# Patient Record
Sex: FEMALE | Race: WHITE | NOT HISPANIC OR LATINO | Employment: OTHER | URBAN - METROPOLITAN AREA
[De-identification: names, ages, dates, MRNs, and addresses within clinical notes are randomized per-mention and may not be internally consistent; named-entity substitution may affect disease eponyms.]

---

## 2023-08-31 ENCOUNTER — HOSPITAL ENCOUNTER (OUTPATIENT)
Dept: DATA CONVERSION | Facility: HOSPITAL | Age: 58
Discharge: HOME | End: 2023-08-31
Payer: MEDICARE

## 2023-08-31 DIAGNOSIS — R60.9 EDEMA, UNSPECIFIED: ICD-10-CM

## 2023-08-31 DIAGNOSIS — R06.02 SHORTNESS OF BREATH: ICD-10-CM

## 2023-09-01 LAB
ANION GAP SERPL CALCULATED.3IONS-SCNC: 20 MMOL/L (ref 0–19)
BUN SERPL-MCNC: 11 MG/DL (ref 8–25)
BUN/CREAT SERPL: 10 RATIO (ref 8–21)
CALCIUM SERPL-MCNC: 8.8 MG/DL (ref 8.5–10.4)
CHLORIDE SERPL-SCNC: 93 MMOL/L (ref 97–107)
CO2 SERPL-SCNC: 21 MMOL/L (ref 24–31)
CREAT SERPL-MCNC: 1.1 MG/DL (ref 0.4–1.6)
GFR SERPL CREATININE-BSD FRML MDRD: 59 ML/MIN/1.73 M2
GLUCOSE SERPL-MCNC: 144 MG/DL (ref 65–99)
NT-PROBNP SERPL-MCNC: 297 PG/ML (ref 0–226)
POTASSIUM SERPL-SCNC: 3.2 MMOL/L (ref 3.4–5.1)
SODIUM SERPL-SCNC: 134 MMOL/L (ref 133–145)

## 2023-09-12 ENCOUNTER — HOSPITAL ENCOUNTER (OUTPATIENT)
Dept: DATA CONVERSION | Facility: HOSPITAL | Age: 58
Discharge: HOME | End: 2023-09-12
Payer: MEDICARE

## 2023-09-12 DIAGNOSIS — E87.6 HYPOKALEMIA: ICD-10-CM

## 2023-09-12 LAB — POTASSIUM SERPL-SCNC: 3.8 MMOL/L (ref 3.4–5.1)

## 2023-09-16 VITALS
BODY MASS INDEX: 44.56 KG/M2 | SYSTOLIC BLOOD PRESSURE: 126 MMHG | OXYGEN SATURATION: 6 % | RESPIRATION RATE: 20 BRPM | HEIGHT: 61 IN | WEIGHT: 236 LBS | DIASTOLIC BLOOD PRESSURE: 70 MMHG | HEART RATE: 104 BPM | TEMPERATURE: 97.6 F

## 2023-10-10 ENCOUNTER — TELEMEDICINE (OUTPATIENT)
Dept: PRIMARY CARE | Facility: CLINIC | Age: 58
End: 2023-10-10
Payer: MEDICARE

## 2023-10-10 DIAGNOSIS — J32.9 SINUSITIS, UNSPECIFIED CHRONICITY, UNSPECIFIED LOCATION: Primary | ICD-10-CM

## 2023-10-10 PROCEDURE — 99213 OFFICE O/P EST LOW 20 MIN: CPT | Performed by: FAMILY MEDICINE

## 2023-10-10 PROCEDURE — 99213 OFFICE O/P EST LOW 20 MIN: CPT | Mod: 95 | Performed by: FAMILY MEDICINE

## 2023-10-10 RX ORDER — AMOXICILLIN AND CLAVULANATE POTASSIUM 875; 125 MG/1; MG/1
875 TABLET, FILM COATED ORAL 2 TIMES DAILY
Qty: 20 TABLET | Refills: 0 | Status: SHIPPED | OUTPATIENT
Start: 2023-10-10 | End: 2023-10-20

## 2023-10-10 RX ORDER — FLUOXETINE 10 MG/1
10 CAPSULE ORAL DAILY
COMMUNITY

## 2023-10-10 RX ORDER — LISINOPRIL 40 MG/1
40 TABLET ORAL DAILY
COMMUNITY
End: 2023-12-18 | Stop reason: SDUPTHER

## 2023-10-10 RX ORDER — ATORVASTATIN CALCIUM 20 MG/1
20 TABLET, FILM COATED ORAL DAILY
COMMUNITY

## 2023-10-10 RX ORDER — ZIPRASIDONE HYDROCHLORIDE 60 MG/1
60 CAPSULE ORAL EVERY 12 HOURS
COMMUNITY
Start: 2022-11-07

## 2023-10-10 RX ORDER — FUROSEMIDE 40 MG/1
80 TABLET ORAL DAILY
COMMUNITY
End: 2023-12-11

## 2023-10-10 RX ORDER — SUMATRIPTAN SUCCINATE 100 MG/1
100 TABLET ORAL ONCE AS NEEDED
COMMUNITY
Start: 2020-10-13

## 2023-10-10 RX ORDER — POTASSIUM CHLORIDE 1500 MG/1
20 TABLET, EXTENDED RELEASE ORAL DAILY
COMMUNITY
Start: 2022-10-15

## 2023-10-10 RX ORDER — ALBUTEROL SULFATE 0.83 MG/ML
2.5 SOLUTION RESPIRATORY (INHALATION) EVERY 6 HOURS PRN
COMMUNITY
Start: 2012-04-26 | End: 2023-11-15 | Stop reason: WASHOUT

## 2023-10-10 RX ORDER — BUSPIRONE HYDROCHLORIDE 30 MG/1
30 TABLET ORAL 2 TIMES DAILY
COMMUNITY

## 2023-10-10 ASSESSMENT — ENCOUNTER SYMPTOMS
COUGH: 1
SINUS PRESSURE: 1
SORE THROAT: 1
SINUS PAIN: 1

## 2023-10-10 ASSESSMENT — PATIENT HEALTH QUESTIONNAIRE - PHQ9
2. FEELING DOWN, DEPRESSED OR HOPELESS: NOT AT ALL
SUM OF ALL RESPONSES TO PHQ9 QUESTIONS 1 AND 2: 0
1. LITTLE INTEREST OR PLEASURE IN DOING THINGS: NOT AT ALL

## 2023-10-10 NOTE — PROGRESS NOTES
Subjective   Patient ID: Celine Jhaveri is a 58 y.o. female who presents for Cough (SHE STATES THIS STARTED 5 DAYS AGO ).    HPI     Review of Systems    Objective   There were no vitals taken for this visit.    Physical Exam    Assessment/Plan   Problem List Items Addressed This Visit             ICD-10-CM    Sinusitis - Primary J32.9    Relevant Medications    amoxicillin-pot clavulanate (Augmentin) 875-125 mg tablet

## 2023-10-10 NOTE — PROGRESS NOTES
Subjective   Patient ID: Celine Jhaveri is a 58 y.o. female who presents for COUGH     Cough  Associated symptoms include a sore throat.    PT C/O COUGH, SINUS ISSUES, AND SORE THROAT SINCE FRIDAY     Review of Systems   HENT:  Positive for sinus pressure, sinus pain and sore throat.    Respiratory:  Positive for cough.        Objective   There were no vitals taken for this visit.    Physical Exam    Assessment/Plan

## 2023-11-10 ENCOUNTER — HOSPITAL ENCOUNTER (EMERGENCY)
Facility: HOSPITAL | Age: 58
Discharge: HOME | End: 2023-11-10
Attending: STUDENT IN AN ORGANIZED HEALTH CARE EDUCATION/TRAINING PROGRAM
Payer: COMMERCIAL

## 2023-11-10 ENCOUNTER — HOSPITAL ENCOUNTER (OUTPATIENT)
Dept: CARDIOLOGY | Facility: HOSPITAL | Age: 58
Discharge: HOME | End: 2023-11-10
Payer: COMMERCIAL

## 2023-11-10 ENCOUNTER — APPOINTMENT (OUTPATIENT)
Dept: RADIOLOGY | Facility: HOSPITAL | Age: 58
End: 2023-11-10
Payer: COMMERCIAL

## 2023-11-10 VITALS
WEIGHT: 227 LBS | OXYGEN SATURATION: 95 % | RESPIRATION RATE: 18 BRPM | BODY MASS INDEX: 41.77 KG/M2 | HEIGHT: 62 IN | SYSTOLIC BLOOD PRESSURE: 109 MMHG | HEART RATE: 94 BPM | DIASTOLIC BLOOD PRESSURE: 67 MMHG | TEMPERATURE: 97.9 F

## 2023-11-10 DIAGNOSIS — J06.9 UPPER RESPIRATORY TRACT INFECTION, UNSPECIFIED TYPE: ICD-10-CM

## 2023-11-10 DIAGNOSIS — J44.1 COPD EXACERBATION (MULTI): Primary | ICD-10-CM

## 2023-11-10 LAB
ALBUMIN SERPL-MCNC: 3.8 G/DL (ref 3.5–5)
ALP BLD-CCNC: 197 U/L (ref 35–125)
ALT SERPL-CCNC: 19 U/L (ref 5–40)
ANION GAP SERPL CALC-SCNC: 9 MMOL/L
AST SERPL-CCNC: 19 U/L (ref 5–40)
ATRIAL RATE: 95 BPM
BASOPHILS # BLD AUTO: 0.07 X10*3/UL (ref 0–0.1)
BASOPHILS NFR BLD AUTO: 0.9 %
BILIRUB SERPL-MCNC: 0.9 MG/DL (ref 0.1–1.2)
BUN SERPL-MCNC: 14 MG/DL (ref 8–25)
CALCIUM SERPL-MCNC: 9 MG/DL (ref 8.5–10.4)
CHLORIDE SERPL-SCNC: 100 MMOL/L (ref 97–107)
CO2 SERPL-SCNC: 28 MMOL/L (ref 24–31)
CREAT SERPL-MCNC: 1.3 MG/DL (ref 0.4–1.6)
EOSINOPHIL # BLD AUTO: 0.21 X10*3/UL (ref 0–0.7)
EOSINOPHIL NFR BLD AUTO: 2.7 %
ERYTHROCYTE [DISTWIDTH] IN BLOOD BY AUTOMATED COUNT: 13.2 % (ref 11.5–14.5)
FLUAV RNA RESP QL NAA+PROBE: NOT DETECTED
FLUBV RNA RESP QL NAA+PROBE: NOT DETECTED
GFR SERPL CREATININE-BSD FRML MDRD: 48 ML/MIN/1.73M*2
GLUCOSE SERPL-MCNC: 104 MG/DL (ref 65–99)
HCT VFR BLD AUTO: 42.7 % (ref 36–46)
HGB BLD-MCNC: 15 G/DL (ref 12–16)
IMM GRANULOCYTES # BLD AUTO: 0.02 X10*3/UL (ref 0–0.7)
IMM GRANULOCYTES NFR BLD AUTO: 0.3 % (ref 0–0.9)
LYMPHOCYTES # BLD AUTO: 1.61 X10*3/UL (ref 1.2–4.8)
LYMPHOCYTES NFR BLD AUTO: 21 %
MCH RBC QN AUTO: 33 PG (ref 26–34)
MCHC RBC AUTO-ENTMCNC: 35.1 G/DL (ref 32–36)
MCV RBC AUTO: 94 FL (ref 80–100)
MONOCYTES # BLD AUTO: 0.66 X10*3/UL (ref 0.1–1)
MONOCYTES NFR BLD AUTO: 8.6 %
NEUTROPHILS # BLD AUTO: 5.1 X10*3/UL (ref 1.2–7.7)
NEUTROPHILS NFR BLD AUTO: 66.5 %
NRBC BLD-RTO: 0 /100 WBCS (ref 0–0)
NT-PROBNP SERPL-MCNC: 67 PG/ML (ref 0–226)
P AXIS: 65 DEGREES
P OFFSET: 192 MS
P ONSET: 133 MS
PLATELET # BLD AUTO: 143 X10*3/UL (ref 150–450)
POTASSIUM SERPL-SCNC: 3.2 MMOL/L (ref 3.4–5.1)
PR INTERVAL: 174 MS
PROT SERPL-MCNC: 7.3 G/DL (ref 5.9–7.9)
Q ONSET: 220 MS
QRS COUNT: 16 BEATS
QRS DURATION: 80 MS
QT INTERVAL: 382 MS
QTC CALCULATION(BAZETT): 480 MS
QTC FREDERICIA: 445 MS
R AXIS: 9 DEGREES
RBC # BLD AUTO: 4.54 X10*6/UL (ref 4–5.2)
SARS-COV-2 RNA RESP QL NAA+PROBE: NOT DETECTED
SODIUM SERPL-SCNC: 137 MMOL/L (ref 133–145)
T AXIS: 60 DEGREES
T OFFSET: 411 MS
TROPONIN T SERPL-MCNC: 8 NG/L
VENTRICULAR RATE: 95 BPM
WBC # BLD AUTO: 7.7 X10*3/UL (ref 4.4–11.3)

## 2023-11-10 PROCEDURE — 83880 ASSAY OF NATRIURETIC PEPTIDE: CPT | Performed by: STUDENT IN AN ORGANIZED HEALTH CARE EDUCATION/TRAINING PROGRAM

## 2023-11-10 PROCEDURE — 85025 COMPLETE CBC W/AUTO DIFF WBC: CPT | Performed by: STUDENT IN AN ORGANIZED HEALTH CARE EDUCATION/TRAINING PROGRAM

## 2023-11-10 PROCEDURE — 2500000002 HC RX 250 W HCPCS SELF ADMINISTERED DRUGS (ALT 637 FOR MEDICARE OP, ALT 636 FOR OP/ED): Performed by: STUDENT IN AN ORGANIZED HEALTH CARE EDUCATION/TRAINING PROGRAM

## 2023-11-10 PROCEDURE — 99284 EMERGENCY DEPT VISIT MOD MDM: CPT | Mod: 25

## 2023-11-10 PROCEDURE — 84484 ASSAY OF TROPONIN QUANT: CPT | Performed by: STUDENT IN AN ORGANIZED HEALTH CARE EDUCATION/TRAINING PROGRAM

## 2023-11-10 PROCEDURE — 80053 COMPREHEN METABOLIC PANEL: CPT | Performed by: STUDENT IN AN ORGANIZED HEALTH CARE EDUCATION/TRAINING PROGRAM

## 2023-11-10 PROCEDURE — 2500000004 HC RX 250 GENERAL PHARMACY W/ HCPCS (ALT 636 FOR OP/ED): Performed by: STUDENT IN AN ORGANIZED HEALTH CARE EDUCATION/TRAINING PROGRAM

## 2023-11-10 PROCEDURE — 71045 X-RAY EXAM CHEST 1 VIEW: CPT | Mod: FY

## 2023-11-10 PROCEDURE — 94640 AIRWAY INHALATION TREATMENT: CPT

## 2023-11-10 PROCEDURE — 93005 ELECTROCARDIOGRAM TRACING: CPT

## 2023-11-10 PROCEDURE — 36415 COLL VENOUS BLD VENIPUNCTURE: CPT | Performed by: STUDENT IN AN ORGANIZED HEALTH CARE EDUCATION/TRAINING PROGRAM

## 2023-11-10 PROCEDURE — 96374 THER/PROPH/DIAG INJ IV PUSH: CPT

## 2023-11-10 PROCEDURE — 87636 SARSCOV2 & INF A&B AMP PRB: CPT | Performed by: STUDENT IN AN ORGANIZED HEALTH CARE EDUCATION/TRAINING PROGRAM

## 2023-11-10 PROCEDURE — 99285 EMERGENCY DEPT VISIT HI MDM: CPT | Mod: 25 | Performed by: STUDENT IN AN ORGANIZED HEALTH CARE EDUCATION/TRAINING PROGRAM

## 2023-11-10 RX ORDER — PREDNISONE 20 MG/1
40 TABLET ORAL DAILY
Qty: 10 TABLET | Refills: 0 | Status: SHIPPED | OUTPATIENT
Start: 2023-11-10 | End: 2023-11-15 | Stop reason: WASHOUT

## 2023-11-10 RX ORDER — IPRATROPIUM BROMIDE AND ALBUTEROL SULFATE 2.5; .5 MG/3ML; MG/3ML
3 SOLUTION RESPIRATORY (INHALATION) ONCE
Status: COMPLETED | OUTPATIENT
Start: 2023-11-10 | End: 2023-11-10

## 2023-11-10 RX ORDER — TORSEMIDE 100 MG/1
100 TABLET ORAL DAILY
COMMUNITY
End: 2024-03-05

## 2023-11-10 RX ORDER — POTASSIUM CHLORIDE 20 MEQ/1
20 TABLET, EXTENDED RELEASE ORAL ONCE
Status: COMPLETED | OUTPATIENT
Start: 2023-11-10 | End: 2023-11-10

## 2023-11-10 RX ADMIN — IPRATROPIUM BROMIDE AND ALBUTEROL SULFATE 3 ML: 2.5; .5 SOLUTION RESPIRATORY (INHALATION) at 09:24

## 2023-11-10 RX ADMIN — METHYLPREDNISOLONE SODIUM SUCCINATE 125 MG: 125 INJECTION, POWDER, FOR SOLUTION INTRAMUSCULAR; INTRAVENOUS at 07:50

## 2023-11-10 RX ADMIN — IPRATROPIUM BROMIDE AND ALBUTEROL SULFATE 3 ML: 2.5; .5 SOLUTION RESPIRATORY (INHALATION) at 07:50

## 2023-11-10 RX ADMIN — POTASSIUM CHLORIDE 20 MEQ: 1500 TABLET, EXTENDED RELEASE ORAL at 08:25

## 2023-11-10 ASSESSMENT — COLUMBIA-SUICIDE SEVERITY RATING SCALE - C-SSRS
2. HAVE YOU ACTUALLY HAD ANY THOUGHTS OF KILLING YOURSELF?: NO
1. IN THE PAST MONTH, HAVE YOU WISHED YOU WERE DEAD OR WISHED YOU COULD GO TO SLEEP AND NOT WAKE UP?: NO
6. HAVE YOU EVER DONE ANYTHING, STARTED TO DO ANYTHING, OR PREPARED TO DO ANYTHING TO END YOUR LIFE?: NO

## 2023-11-10 ASSESSMENT — PAIN SCALES - GENERAL
PAINLEVEL_OUTOF10: 0 - NO PAIN
PAINLEVEL_OUTOF10: 0 - NO PAIN
PAINLEVEL_OUTOF10: 5 - MODERATE PAIN

## 2023-11-10 ASSESSMENT — PAIN DESCRIPTION - PAIN TYPE: TYPE: OTHER (COMMENT)

## 2023-11-10 ASSESSMENT — PAIN DESCRIPTION - PROGRESSION: CLINICAL_PROGRESSION: NOT CHANGED

## 2023-11-10 ASSESSMENT — PAIN - FUNCTIONAL ASSESSMENT
PAIN_FUNCTIONAL_ASSESSMENT: 0-10
PAIN_FUNCTIONAL_ASSESSMENT: 0-10

## 2023-11-10 ASSESSMENT — PAIN DESCRIPTION - LOCATION: LOCATION: HEAD

## 2023-11-10 NOTE — ED PROVIDER NOTES
"HPI   Chief Complaint   Patient presents with    Shortness of Breath     Sob x 1 days, 1ppd smoker, woke from sleep at 0400 with sob       Patient is a 58-year-old female who presents the emergency department for evaluation of shortness of breath.  Patient states that she has a history of COPD however is not on chronic oxygen.  She started having shortness of breath beginning yesterday along with a productive cough.  She denies any hemoptysis.  She has been using her home albuterol with no improvement of symptoms.  She states is progressively been getting worse.  She does admit to some chest tightness, generalized fatigue.  She denies fever, chills, chest pain, abdominal pain, nausea or vomiting.  EMS noted that patient did have normal oxygen saturation prior to arrival however no medication given prior to arrival.      History provided by:  Patient                      No data recorded                Patient History   Past Medical History:   Diagnosis Date    Anxiety and depression     Bipolar 1 disorder (CMS/[facility])     COPD (chronic obstructive pulmonary disease) (CMS/[facility])     HTN (hypertension)     LINDA (obstructive sleep apnea)      Past Surgical History:   Procedure Laterality Date     SECTION, CLASSIC      FOOT Right     GALLBLADDER SURGERY      HYSTERECTOMY      KNEE Right      No family history on file.  Social History     Tobacco Use    Smoking status: Every Day     Packs/day: 1.00     Years: 46.00     Additional pack years: 0.00     Total pack years: 46.00     Types: Cigarettes    Smokeless tobacco: Never   Vaping Use    Vaping Use: Never used   Substance Use Topics    Alcohol use: Not Currently     Comment: \"recovering addict\"    Drug use: Defer       Physical Exam   ED Triage Vitals [11/10/23 0736]   Temp Heart Rate Resp BP   36.6 °C (97.9 °F) 96 18 (!) 118/92      SpO2 Temp Source Heart Rate Source Patient Position   97 % Tympanic Monitor Sitting      BP Location FiO2 (%)     Left arm --   "     Physical Exam  Vitals and nursing note reviewed.   Constitutional:       General: She is not in acute distress.     Appearance: She is well-developed. She is obese. She is not ill-appearing.   HENT:      Head: Normocephalic and atraumatic.      Mouth/Throat:      Mouth: Mucous membranes are moist.   Eyes:      Extraocular Movements: Extraocular movements intact.      Pupils: Pupils are equal, round, and reactive to light.   Cardiovascular:      Rate and Rhythm: Normal rate and regular rhythm.   Pulmonary:      Effort: Pulmonary effort is normal. No tachypnea, bradypnea, accessory muscle usage or respiratory distress.      Breath sounds: No stridor. Wheezing (Mild bilateral expiratory wheeze) present. No decreased breath sounds, rhonchi or rales.   Musculoskeletal:      Right lower leg: No edema.      Left lower leg: No edema.   Skin:     General: Skin is warm and dry.   Neurological:      General: No focal deficit present.      Mental Status: She is alert.       Recent Results (from the past 24 hour(s))   CBC and Auto Differential    Collection Time: 11/10/23  7:41 AM   Result Value Ref Range    WBC 7.7 4.4 - 11.3 x10*3/uL    nRBC 0.0 0.0 - 0.0 /100 WBCs    RBC 4.54 4.00 - 5.20 x10*6/uL    Hemoglobin 15.0 12.0 - 16.0 g/dL    Hematocrit 42.7 36.0 - 46.0 %    MCV 94 80 - 100 fL    MCH 33.0 26.0 - 34.0 pg    MCHC 35.1 32.0 - 36.0 g/dL    RDW 13.2 11.5 - 14.5 %    Platelets 143 (L) 150 - 450 x10*3/uL    Neutrophils % 66.5 40.0 - 80.0 %    Immature Granulocytes %, Automated 0.3 0.0 - 0.9 %    Lymphocytes % 21.0 13.0 - 44.0 %    Monocytes % 8.6 2.0 - 10.0 %    Eosinophils % 2.7 0.0 - 6.0 %    Basophils % 0.9 0.0 - 2.0 %    Neutrophils Absolute 5.10 1.20 - 7.70 x10*3/uL    Immature Granulocytes Absolute, Automated 0.02 0.00 - 0.70 x10*3/uL    Lymphocytes Absolute 1.61 1.20 - 4.80 x10*3/uL    Monocytes Absolute 0.66 0.10 - 1.00 x10*3/uL    Eosinophils Absolute 0.21 0.00 - 0.70 x10*3/uL    Basophils Absolute 0.07 0.00 -  0.10 x10*3/uL   Comprehensive Metabolic Panel    Collection Time: 11/10/23  7:41 AM   Result Value Ref Range    Glucose 104 (H) 65 - 99 mg/dL    Sodium 137 133 - 145 mmol/L    Potassium 3.2 (L) 3.4 - 5.1 mmol/L    Chloride 100 97 - 107 mmol/L    Bicarbonate 28 24 - 31 mmol/L    Urea Nitrogen 14 8 - 25 mg/dL    Creatinine 1.30 0.40 - 1.60 mg/dL    eGFR 48 (L) >60 mL/min/1.73m*2    Calcium 9.0 8.5 - 10.4 mg/dL    Albumin 3.8 3.5 - 5.0 g/dL    Alkaline Phosphatase 197 (H) 35 - 125 U/L    Total Protein 7.3 5.9 - 7.9 g/dL    AST 19 5 - 40 U/L    Bilirubin, Total 0.9 0.1 - 1.2 mg/dL    ALT 19 5 - 40 U/L    Anion Gap 9 <=19 mmol/L   NT Pro-BNP    Collection Time: 11/10/23  7:41 AM   Result Value Ref Range    PROBNP 67 0 - 226 pg/mL   Serial Troponin, Initial ([facility])    Collection Time: 11/10/23  7:41 AM   Result Value Ref Range    Troponin T, High Sensitivity 8 <=15 ng/L   Sars-CoV-2 PCR, Symptomatic    Collection Time: 11/10/23  7:49 AM   Result Value Ref Range    Coronavirus 2019, PCR Not Detected Not Detected   Influenza A, and B PCR    Collection Time: 11/10/23  7:49 AM   Result Value Ref Range    Flu A Result Not Detected Not Detected    Flu B Result Not Detected Not Detected       ED Course & MDM   ED Course as of 11/10/23 0911   Fri Nov 10, 2023   0744 EKG Time: 0 741  EKG Interpretation time: 0 741  EKG Interpretation: Normal sinus rhythm, normal axis, QTc 480, no evidence of STEMI    EKG was interpreted by myself independently [JL]      ED Course User Index  [JL] Thompson Armas DO         Diagnoses as of 11/10/23 0911   COPD exacerbation (CMS/HCC)   Upper respiratory tract infection, unspecified type       Medical Decision Making  Patient is a 58-year-old female that presents emergency room for evaluation of shortness of breath.  Patient uncomfortable appearing but in no obvious distress.  Vital signs are stable on arrival.  She is awake, alert and oriented.  She was on 3 L nasal cannula on arrival however  reportedly was not hypoxic prior to arrival.  Patient will be taken off of oxygen at this time to determine whether she requires any supplemental oxygen.  Lab work ordered including CBC, CMP, troponin, BNP along with a COVID-19 and influenza test.  Chest x-ray ordered along with EKG.  EKG shows normal sinus rhythm with nonspecific changes but no evidence of STEMI.  Patient will be given DuoNeb breathing treatment, IV Solu-Medrol given her history of COPD as well as bilateral expiratory wheezing.  Blood work unremarkable including negative troponin of 8, normal BNP of 67, normal white count with no left shift.  Patient chest x-ray was clear showing no obvious evidence of pneumonia, pneumothorax, widened mediastinum.  She did have improvement with DuoNeb breathing treatment, IV Solu-Medrol.  Patient still has very mild wheezing present and will be given a second DuoNeb breathing treatment however history exam most consistent with a COPD exacerbation with a viral upper respiratory tract infection.  I did discuss these results with the patient as well as plan for discharge with prednisone.  Her oxygen saturation is 97% on room air at this time.  Patient to follow-up with primary care physician as an outpatient within the next week for reevaluation.        Procedure  Procedures     Thompson Armas, DO  11/10/23 0912

## 2023-11-10 NOTE — DISCHARGE INSTRUCTIONS
It is important take prednisone daily as prescribed.  You should continue to use your albuterol nebulizer at home every 4-6 hours for the next several days and then as needed for shortness of breath after that.  If symptoms significantly worsen or change he can return at any time for further evaluation and treatment.  Follow-up with your primary care physician within 1 week for reevaluation.

## 2023-11-15 ENCOUNTER — OFFICE VISIT (OUTPATIENT)
Dept: PRIMARY CARE | Facility: CLINIC | Age: 58
End: 2023-11-15
Payer: COMMERCIAL

## 2023-11-15 VITALS
HEART RATE: 107 BPM | DIASTOLIC BLOOD PRESSURE: 76 MMHG | BODY MASS INDEX: 40.6 KG/M2 | WEIGHT: 222 LBS | TEMPERATURE: 96.9 F | SYSTOLIC BLOOD PRESSURE: 112 MMHG | OXYGEN SATURATION: 97 % | RESPIRATION RATE: 23 BRPM

## 2023-11-15 DIAGNOSIS — E87.6 HYPOKALEMIA: ICD-10-CM

## 2023-11-15 DIAGNOSIS — J44.1 ACUTE EXACERBATION OF CHRONIC OBSTRUCTIVE PULMONARY DISEASE (MULTI): Primary | ICD-10-CM

## 2023-11-15 DIAGNOSIS — F17.200 TOBACCO USE DISORDER: ICD-10-CM

## 2023-11-15 PROBLEM — J32.0 CHRONIC MAXILLARY SINUSITIS: Status: ACTIVE | Noted: 2023-11-15

## 2023-11-15 PROBLEM — E78.5 HYPERLIPIDEMIA: Status: ACTIVE | Noted: 2023-11-15

## 2023-11-15 PROBLEM — R39.11 URINARY HESITANCY: Status: ACTIVE | Noted: 2023-11-15

## 2023-11-15 PROBLEM — J34.89 NASAL SEPTAL PERFORATION: Status: ACTIVE | Noted: 2023-11-15

## 2023-11-15 PROBLEM — E66.01 MORBID OBESITY (MULTI): Status: ACTIVE | Noted: 2023-11-15

## 2023-11-15 PROBLEM — F31.9 BIPOLAR 1 DISORDER (MULTI): Chronic | Status: ACTIVE | Noted: 2021-05-25

## 2023-11-15 PROBLEM — F10.11 ALCOHOL USE DISORDER, MILD, IN SUSTAINED REMISSION: Status: ACTIVE | Noted: 2021-05-25

## 2023-11-15 PROBLEM — K80.20 CALCULUS OF GALLBLADDER: Status: ACTIVE | Noted: 2023-11-15

## 2023-11-15 PROBLEM — K42.9 UMBILICAL HERNIA: Status: ACTIVE | Noted: 2023-11-15

## 2023-11-15 PROBLEM — G56.00 CARPAL TUNNEL SYNDROME: Status: ACTIVE | Noted: 2023-11-15

## 2023-11-15 PROBLEM — R13.10 DYSPHAGIA: Status: ACTIVE | Noted: 2023-11-15

## 2023-11-15 PROBLEM — G47.9 SLEEP DISTURBANCE: Status: ACTIVE | Noted: 2023-11-15

## 2023-11-15 PROBLEM — N81.10 FEMALE CYSTOCELE: Status: ACTIVE | Noted: 2023-11-15

## 2023-11-15 PROBLEM — G25.81 RESTLESS LEGS: Status: ACTIVE | Noted: 2023-11-15

## 2023-11-15 PROBLEM — N19 RENAL FAILURE: Status: ACTIVE | Noted: 2023-11-15

## 2023-11-15 PROBLEM — J45.909 ASTHMA (HHS-HCC): Status: ACTIVE | Noted: 2023-11-15

## 2023-11-15 PROBLEM — G47.9 SLEEP DISORDER, UNSPECIFIED: Status: ACTIVE | Noted: 2023-11-15

## 2023-11-15 PROBLEM — J44.9 CHRONIC OBSTRUCTIVE PULMONARY DISEASE (MULTI): Status: ACTIVE | Noted: 2023-11-15

## 2023-11-15 PROBLEM — F11.11: Chronic | Status: ACTIVE | Noted: 2021-05-25

## 2023-11-15 PROBLEM — M19.90 OSTEOARTHRITIS: Status: ACTIVE | Noted: 2023-11-15

## 2023-11-15 PROBLEM — I87.2 PERIPHERAL VENOUS INSUFFICIENCY: Status: ACTIVE | Noted: 2023-11-15

## 2023-11-15 PROBLEM — F41.9 ANXIETY DISORDER, UNSPECIFIED: Status: ACTIVE | Noted: 2021-05-25

## 2023-11-15 PROBLEM — M79.7 FIBROMYALGIA: Status: ACTIVE | Noted: 2023-11-15

## 2023-11-15 PROBLEM — R00.0 TACHYCARDIA: Status: ACTIVE | Noted: 2023-11-15

## 2023-11-15 PROBLEM — I50.9 CONGESTIVE HEART FAILURE (MULTI): Status: ACTIVE | Noted: 2023-11-15

## 2023-11-15 PROBLEM — G43.909 MIGRAINE: Status: ACTIVE | Noted: 2023-11-15

## 2023-11-15 PROBLEM — E55.9 VITAMIN D DEFICIENCY: Status: ACTIVE | Noted: 2023-11-15

## 2023-11-15 PROBLEM — F32.A DEPRESSIVE DISORDER: Status: ACTIVE | Noted: 2023-11-15

## 2023-11-15 LAB
ALBUMIN SERPL-MCNC: 4 G/DL (ref 3.5–5)
ALP BLD-CCNC: 182 U/L (ref 35–125)
ALT SERPL-CCNC: 24 U/L (ref 5–40)
ANION GAP SERPL CALC-SCNC: 16 MMOL/L
AST SERPL-CCNC: 18 U/L (ref 5–40)
BILIRUB SERPL-MCNC: 0.5 MG/DL (ref 0.1–1.2)
BUN SERPL-MCNC: 34 MG/DL (ref 8–25)
CALCIUM SERPL-MCNC: 9.8 MG/DL (ref 8.5–10.4)
CHLORIDE SERPL-SCNC: 97 MMOL/L (ref 97–107)
CO2 SERPL-SCNC: 27 MMOL/L (ref 24–31)
CREAT SERPL-MCNC: 1.8 MG/DL (ref 0.4–1.6)
GFR SERPL CREATININE-BSD FRML MDRD: 33 ML/MIN/1.73M*2
GLUCOSE SERPL-MCNC: 142 MG/DL (ref 65–99)
POTASSIUM SERPL-SCNC: 3.6 MMOL/L (ref 3.4–5.1)
PROT SERPL-MCNC: 7.5 G/DL (ref 5.9–7.9)
SODIUM SERPL-SCNC: 140 MMOL/L (ref 133–145)

## 2023-11-15 PROCEDURE — 36415 COLL VENOUS BLD VENIPUNCTURE: CPT | Performed by: REGISTERED NURSE

## 2023-11-15 PROCEDURE — 80053 COMPREHEN METABOLIC PANEL: CPT | Performed by: REGISTERED NURSE

## 2023-11-15 RX ORDER — IPRATROPIUM BROMIDE AND ALBUTEROL SULFATE 2.5; .5 MG/3ML; MG/3ML
3 SOLUTION RESPIRATORY (INHALATION)
Qty: 360 ML | Refills: 2 | Status: SHIPPED | OUTPATIENT
Start: 2023-11-15 | End: 2024-05-29

## 2023-11-15 RX ORDER — LORAZEPAM 0.5 MG/1
0.5 TABLET ORAL EVERY 6 HOURS PRN
COMMUNITY
Start: 2014-06-26 | End: 2024-05-29 | Stop reason: WASHOUT

## 2023-11-15 RX ORDER — ESCITALOPRAM OXALATE 20 MG/1
20 TABLET ORAL DAILY
COMMUNITY
Start: 2014-06-26 | End: 2024-05-29 | Stop reason: WASHOUT

## 2023-11-15 RX ORDER — LANOLIN ALCOHOL/MO/W.PET/CERES
400 CREAM (GRAM) TOPICAL DAILY
COMMUNITY
Start: 2014-06-26 | End: 2024-05-29 | Stop reason: WASHOUT

## 2023-11-15 RX ORDER — TRAZODONE HYDROCHLORIDE 50 MG/1
50 TABLET ORAL NIGHTLY
COMMUNITY
Start: 2014-06-26 | End: 2024-05-29 | Stop reason: WASHOUT

## 2023-11-15 RX ORDER — MONTELUKAST SODIUM 10 MG/1
10 TABLET ORAL NIGHTLY
Qty: 30 TABLET | Refills: 5 | Status: SHIPPED | OUTPATIENT
Start: 2023-11-15 | End: 2024-04-25

## 2023-11-15 RX ORDER — METOPROLOL SUCCINATE 50 MG/1
50 TABLET, EXTENDED RELEASE ORAL DAILY
COMMUNITY
Start: 2014-06-26 | End: 2024-05-29 | Stop reason: WASHOUT

## 2023-11-15 RX ORDER — ASPIRIN 81 MG/1
81 TABLET ORAL ONCE
COMMUNITY
Start: 2014-06-26 | End: 2023-11-15 | Stop reason: WASHOUT

## 2023-11-15 RX ORDER — ALBUTEROL SULFATE 90 UG/1
2 AEROSOL, METERED RESPIRATORY (INHALATION) EVERY 6 HOURS PRN
COMMUNITY
Start: 2023-10-31

## 2023-11-15 RX ORDER — BUDESONIDE AND FORMOTEROL FUMARATE DIHYDRATE 160; 4.5 UG/1; UG/1
2 AEROSOL RESPIRATORY (INHALATION)
COMMUNITY
Start: 2014-06-26

## 2023-11-15 ASSESSMENT — ENCOUNTER SYMPTOMS
DEPRESSION: 0
OCCASIONAL FEELINGS OF UNSTEADINESS: 0
LOSS OF SENSATION IN FEET: 0

## 2023-11-15 ASSESSMENT — COLUMBIA-SUICIDE SEVERITY RATING SCALE - C-SSRS
6. HAVE YOU EVER DONE ANYTHING, STARTED TO DO ANYTHING, OR PREPARED TO DO ANYTHING TO END YOUR LIFE?: NO
2. HAVE YOU ACTUALLY HAD ANY THOUGHTS OF KILLING YOURSELF?: NO
1. IN THE PAST MONTH, HAVE YOU WISHED YOU WERE DEAD OR WISHED YOU COULD GO TO SLEEP AND NOT WAKE UP?: NO

## 2023-11-15 ASSESSMENT — PAIN SCALES - GENERAL: PAINLEVEL: 0-NO PAIN

## 2023-11-15 ASSESSMENT — PATIENT HEALTH QUESTIONNAIRE - PHQ9
SUM OF ALL RESPONSES TO PHQ9 QUESTIONS 1 AND 2: 0
2. FEELING DOWN, DEPRESSED OR HOPELESS: NOT AT ALL
1. LITTLE INTEREST OR PLEASURE IN DOING THINGS: NOT AT ALL

## 2023-11-15 NOTE — PROGRESS NOTES
Subjective   Patient ID: Celine Jhavrei is a 58 y.o. female who presents for COPD ([facility] ed copd, low patassium, viral infection  refill nebulizer solution).    Follow up after ER for COPD         Review of Systems    Objective   /76   Pulse 107   Temp 36.1 °C (96.9 °F)   Resp 23   Wt 101 kg (222 lb)   SpO2 97%   BMI 40.60 kg/m²     Physical Exam  Vitals reviewed.   Constitutional:       Appearance: Normal appearance.   Cardiovascular:      Rate and Rhythm: Normal rate and regular rhythm.      Pulses: Normal pulses.      Heart sounds: Normal heart sounds.   Pulmonary:      Effort: Pulmonary effort is normal.      Breath sounds: Normal breath sounds.   Neurological:      Mental Status: She is alert.         Assessment/Plan   Problem List Items Addressed This Visit             ICD-10-CM    Acute exacerbation of chronic obstructive pulmonary disease (CMS/HCC) J44.1    Tobacco use disorder - Primary F17.200

## 2023-11-20 ENCOUNTER — TELEPHONE (OUTPATIENT)
Dept: PRIMARY CARE | Facility: CLINIC | Age: 58
End: 2023-11-20
Payer: COMMERCIAL

## 2023-11-20 DIAGNOSIS — N19 RENAL FAILURE, UNSPECIFIED CHRONICITY: Primary | ICD-10-CM

## 2023-12-09 DIAGNOSIS — R60.0 LEG EDEMA: Primary | ICD-10-CM

## 2023-12-11 ENCOUNTER — OFFICE VISIT (OUTPATIENT)
Dept: PRIMARY CARE | Facility: CLINIC | Age: 58
End: 2023-12-11
Payer: COMMERCIAL

## 2023-12-11 VITALS
HEIGHT: 62 IN | DIASTOLIC BLOOD PRESSURE: 60 MMHG | WEIGHT: 218.4 LBS | RESPIRATION RATE: 20 BRPM | OXYGEN SATURATION: 97 % | TEMPERATURE: 96.8 F | HEART RATE: 117 BPM | BODY MASS INDEX: 40.19 KG/M2 | SYSTOLIC BLOOD PRESSURE: 122 MMHG

## 2023-12-11 DIAGNOSIS — B37.9 CANDIDIASIS: Primary | ICD-10-CM

## 2023-12-11 PROCEDURE — 99213 OFFICE O/P EST LOW 20 MIN: CPT | Performed by: FAMILY MEDICINE

## 2023-12-11 PROCEDURE — 4004F PT TOBACCO SCREEN RCVD TLK: CPT | Performed by: FAMILY MEDICINE

## 2023-12-11 PROCEDURE — 3074F SYST BP LT 130 MM HG: CPT | Performed by: FAMILY MEDICINE

## 2023-12-11 PROCEDURE — 3078F DIAST BP <80 MM HG: CPT | Performed by: FAMILY MEDICINE

## 2023-12-11 RX ORDER — OMEPRAZOLE 40 MG/1
40 CAPSULE, DELAYED RELEASE ORAL
COMMUNITY
Start: 2023-11-28 | End: 2024-05-29 | Stop reason: WASHOUT

## 2023-12-11 RX ORDER — CLOTRIMAZOLE AND BETAMETHASONE DIPROPIONATE 10; .64 MG/G; MG/G
1 CREAM TOPICAL 2 TIMES DAILY
Qty: 6 G | Refills: 0 | Status: SHIPPED | OUTPATIENT
Start: 2023-12-11 | End: 2024-01-10

## 2023-12-11 RX ORDER — FUROSEMIDE 40 MG/1
80 TABLET ORAL DAILY
Qty: 180 TABLET | Refills: 3 | Status: SHIPPED | OUTPATIENT
Start: 2023-12-11 | End: 2024-05-29 | Stop reason: WASHOUT

## 2023-12-11 RX ORDER — HYDROXYZINE PAMOATE 25 MG/1
25 CAPSULE ORAL 3 TIMES DAILY PRN
COMMUNITY
Start: 2023-12-07

## 2023-12-11 ASSESSMENT — PATIENT HEALTH QUESTIONNAIRE - PHQ9
SUM OF ALL RESPONSES TO PHQ9 QUESTIONS 1 AND 2: 0
1. LITTLE INTEREST OR PLEASURE IN DOING THINGS: NOT AT ALL
2. FEELING DOWN, DEPRESSED OR HOPELESS: NOT AT ALL

## 2023-12-11 ASSESSMENT — PAIN SCALES - GENERAL: PAINLEVEL: 0-NO PAIN

## 2023-12-11 NOTE — PROGRESS NOTES
"Subjective   Patient ID: Celine Jhaveri is a 58 y.o. female who presents for Rash.    HPI pt c/o rash in groin area     Review of Systems    Objective   /60   Pulse (!) 117   Temp 36 °C (96.8 °F)   Resp 20   Ht 1.575 m (5' 2\")   Wt 99.1 kg (218 lb 6.4 oz)   SpO2 97%   BMI 39.95 kg/m²     Physical Exam    Assessment/Plan          "

## 2023-12-11 NOTE — PROGRESS NOTES
"Subjective   Patient ID: Celine Jhaveri is a 58 y.o. female who presents for Rash.    Rash         Review of Systems   Skin:  Positive for rash.       Objective   /60   Pulse (!) 117   Temp 36 °C (96.8 °F)   Resp 20   Ht 1.575 m (5' 2\")   Wt 99.1 kg (218 lb 6.4 oz)   SpO2 97%   BMI 39.95 kg/m²     Physical Exam  Constitutional:       General: She is not in acute distress.     Appearance: Normal appearance.   Cardiovascular:      Rate and Rhythm: Normal rate and regular rhythm.      Heart sounds: No murmur heard.  Pulmonary:      Breath sounds: Normal breath sounds. No wheezing.   Neurological:      Mental Status: She is alert.     Skin:  erythemaotus rash bl groin and lower abdomen    Assessment/Plan   Problem List Items Addressed This Visit    None  Visit Diagnoses         Codes    Candidiasis    -  Primary B37.9    Relevant Medications    clotrimazole-betamethasone (Lotrisone) cream               "

## 2023-12-18 DIAGNOSIS — I10 ESSENTIAL HYPERTENSION: Chronic | ICD-10-CM

## 2023-12-18 RX ORDER — LISINOPRIL 40 MG/1
40 TABLET ORAL DAILY
Qty: 90 TABLET | Refills: 1 | Status: SHIPPED | OUTPATIENT
Start: 2023-12-18

## 2023-12-26 ENCOUNTER — TELEPHONE (OUTPATIENT)
Dept: PRIMARY CARE | Facility: CLINIC | Age: 58
End: 2023-12-26
Payer: COMMERCIAL

## 2023-12-26 DIAGNOSIS — B37.2 YEAST DERMATITIS: Primary | ICD-10-CM

## 2023-12-26 RX ORDER — NYSTATIN 100000 U/G
CREAM TOPICAL 2 TIMES DAILY
Qty: 30 G | Refills: 2 | Status: SHIPPED | OUTPATIENT
Start: 2023-12-26 | End: 2024-01-02

## 2024-01-21 DIAGNOSIS — E87.6 HYPOKALEMIA: ICD-10-CM

## 2024-01-22 RX ORDER — POTASSIUM CHLORIDE 20 MEQ/1
20 TABLET, EXTENDED RELEASE ORAL DAILY
Qty: 90 TABLET | Refills: 1 | Status: SHIPPED | OUTPATIENT
Start: 2024-01-22 | End: 2024-05-29 | Stop reason: WASHOUT

## 2024-02-07 ENCOUNTER — LAB (OUTPATIENT)
Dept: LAB | Facility: LAB | Age: 59
End: 2024-02-07
Payer: COMMERCIAL

## 2024-02-07 ENCOUNTER — HOSPITAL ENCOUNTER (OUTPATIENT)
Dept: RADIOLOGY | Facility: HOSPITAL | Age: 59
Discharge: HOME | End: 2024-02-07
Payer: COMMERCIAL

## 2024-02-07 DIAGNOSIS — N17.9 ACUTE KIDNEY FAILURE, UNSPECIFIED (CMS-HCC): ICD-10-CM

## 2024-02-07 DIAGNOSIS — N17.9 ACUTE KIDNEY FAILURE, UNSPECIFIED (CMS-HCC): Primary | ICD-10-CM

## 2024-02-07 LAB
ALBUMIN SERPL BCP-MCNC: 3.7 G/DL (ref 3.4–5)
ANION GAP SERPL CALC-SCNC: 11 MMOL/L (ref 10–20)
BASOPHILS # BLD AUTO: 0.07 X10*3/UL (ref 0–0.1)
BASOPHILS NFR BLD AUTO: 1.2 %
BUN SERPL-MCNC: 25 MG/DL (ref 6–23)
CALCIUM SERPL-MCNC: 9 MG/DL (ref 8.6–10.3)
CHLORIDE SERPL-SCNC: 90 MMOL/L (ref 98–107)
CK SERPL-CCNC: 132 U/L (ref 0–215)
CO2 SERPL-SCNC: 34 MMOL/L (ref 21–32)
CREAT SERPL-MCNC: 2.02 MG/DL (ref 0.5–1.05)
EGFRCR SERPLBLD CKD-EPI 2021: 28 ML/MIN/1.73M*2
EOSINOPHIL # BLD AUTO: 0.15 X10*3/UL (ref 0–0.7)
EOSINOPHIL NFR BLD AUTO: 2.6 %
ERYTHROCYTE [DISTWIDTH] IN BLOOD BY AUTOMATED COUNT: 12.6 % (ref 11.5–14.5)
GLUCOSE SERPL-MCNC: 83 MG/DL (ref 74–99)
HCT VFR BLD AUTO: 43 % (ref 36–46)
HGB BLD-MCNC: 14.6 G/DL (ref 12–16)
IMM GRANULOCYTES # BLD AUTO: 0.01 X10*3/UL (ref 0–0.7)
IMM GRANULOCYTES NFR BLD AUTO: 0.2 % (ref 0–0.9)
LYMPHOCYTES # BLD AUTO: 1.84 X10*3/UL (ref 1.2–4.8)
LYMPHOCYTES NFR BLD AUTO: 31.9 %
MCH RBC QN AUTO: 32.1 PG (ref 26–34)
MCHC RBC AUTO-ENTMCNC: 34 G/DL (ref 32–36)
MCV RBC AUTO: 95 FL (ref 80–100)
MONOCYTES # BLD AUTO: 0.5 X10*3/UL (ref 0.1–1)
MONOCYTES NFR BLD AUTO: 8.7 %
NEUTROPHILS # BLD AUTO: 3.2 X10*3/UL (ref 1.2–7.7)
NEUTROPHILS NFR BLD AUTO: 55.4 %
NRBC BLD-RTO: 0 /100 WBCS (ref 0–0)
PHOSPHATE SERPL-MCNC: 2.9 MG/DL (ref 2.5–4.9)
PLATELET # BLD AUTO: 155 X10*3/UL (ref 150–450)
POTASSIUM SERPL-SCNC: 3.4 MMOL/L (ref 3.5–5.3)
RBC # BLD AUTO: 4.55 X10*6/UL (ref 4–5.2)
SODIUM SERPL-SCNC: 132 MMOL/L (ref 136–145)
WBC # BLD AUTO: 5.8 X10*3/UL (ref 4.4–11.3)

## 2024-02-07 PROCEDURE — 76770 US EXAM ABDO BACK WALL COMP: CPT

## 2024-02-07 PROCEDURE — 82550 ASSAY OF CK (CPK): CPT

## 2024-02-07 PROCEDURE — 80069 RENAL FUNCTION PANEL: CPT

## 2024-02-07 PROCEDURE — 85025 COMPLETE CBC W/AUTO DIFF WBC: CPT

## 2024-02-07 PROCEDURE — 36415 COLL VENOUS BLD VENIPUNCTURE: CPT

## 2024-02-09 ENCOUNTER — LAB (OUTPATIENT)
Dept: LAB | Facility: LAB | Age: 59
End: 2024-02-09
Payer: COMMERCIAL

## 2024-02-09 DIAGNOSIS — N17.9 ACUTE KIDNEY FAILURE, UNSPECIFIED (CMS-HCC): ICD-10-CM

## 2024-02-09 LAB
APPEARANCE UR: CLEAR
BILIRUB UR STRIP.AUTO-MCNC: NEGATIVE MG/DL
COLOR UR: ABNORMAL
CREAT UR-MCNC: 8.1 MG/DL (ref 20–320)
GLUCOSE UR STRIP.AUTO-MCNC: NEGATIVE MG/DL
HOLD SPECIMEN: NORMAL
KETONES UR STRIP.AUTO-MCNC: NEGATIVE MG/DL
LEUKOCYTE ESTERASE UR QL STRIP.AUTO: NEGATIVE
NITRITE UR QL STRIP.AUTO: NEGATIVE
PH UR STRIP.AUTO: 8 [PH]
PROT UR STRIP.AUTO-MCNC: NEGATIVE MG/DL
PROT UR-ACNC: <4 MG/DL (ref 5–24)
PROT/CREAT UR: ABNORMAL MG/G{CREAT}
RBC # UR STRIP.AUTO: NEGATIVE /UL
SP GR UR STRIP.AUTO: 1
UROBILINOGEN UR STRIP.AUTO-MCNC: <2 MG/DL

## 2024-02-09 PROCEDURE — 81003 URINALYSIS AUTO W/O SCOPE: CPT

## 2024-02-09 PROCEDURE — 82570 ASSAY OF URINE CREATININE: CPT

## 2024-02-09 PROCEDURE — 84156 ASSAY OF PROTEIN URINE: CPT

## 2024-03-01 ENCOUNTER — LAB (OUTPATIENT)
Dept: LAB | Facility: LAB | Age: 59
End: 2024-03-01
Payer: COMMERCIAL

## 2024-03-01 DIAGNOSIS — N17.9 ACUTE KIDNEY FAILURE, UNSPECIFIED (CMS-HCC): Primary | ICD-10-CM

## 2024-03-01 DIAGNOSIS — E87.6 HYPOKALEMIA: ICD-10-CM

## 2024-03-01 LAB
ALBUMIN SERPL BCP-MCNC: 3.5 G/DL (ref 3.4–5)
ANION GAP SERPL CALC-SCNC: 12 MMOL/L (ref 10–20)
BUN SERPL-MCNC: 19 MG/DL (ref 6–23)
CALCIUM SERPL-MCNC: 9.2 MG/DL (ref 8.6–10.3)
CHLORIDE SERPL-SCNC: 96 MMOL/L (ref 98–107)
CO2 SERPL-SCNC: 32 MMOL/L (ref 21–32)
CREAT SERPL-MCNC: 1.32 MG/DL (ref 0.5–1.05)
EGFRCR SERPLBLD CKD-EPI 2021: 47 ML/MIN/1.73M*2
ERYTHROCYTE [DISTWIDTH] IN BLOOD BY AUTOMATED COUNT: 12.7 % (ref 11.5–14.5)
GLUCOSE SERPL-MCNC: 75 MG/DL (ref 74–99)
HCT VFR BLD AUTO: 39 % (ref 36–46)
HGB BLD-MCNC: 13.2 G/DL (ref 12–16)
MAGNESIUM SERPL-MCNC: 1.9 MG/DL (ref 1.6–2.4)
MCH RBC QN AUTO: 32.3 PG (ref 26–34)
MCHC RBC AUTO-ENTMCNC: 33.8 G/DL (ref 32–36)
MCV RBC AUTO: 95 FL (ref 80–100)
NRBC BLD-RTO: 0 /100 WBCS (ref 0–0)
PHOSPHATE SERPL-MCNC: 3.4 MG/DL (ref 2.5–4.9)
PLATELET # BLD AUTO: 169 X10*3/UL (ref 150–450)
POTASSIUM SERPL-SCNC: 3.7 MMOL/L (ref 3.5–5.3)
RBC # BLD AUTO: 4.09 X10*6/UL (ref 4–5.2)
SODIUM SERPL-SCNC: 136 MMOL/L (ref 136–145)
WBC # BLD AUTO: 7.1 X10*3/UL (ref 4.4–11.3)

## 2024-03-01 PROCEDURE — 85027 COMPLETE CBC AUTOMATED: CPT

## 2024-03-01 PROCEDURE — 83735 ASSAY OF MAGNESIUM: CPT

## 2024-03-01 PROCEDURE — 80069 RENAL FUNCTION PANEL: CPT

## 2024-03-01 PROCEDURE — 36415 COLL VENOUS BLD VENIPUNCTURE: CPT

## 2024-03-02 DIAGNOSIS — R60.0 LEG EDEMA: ICD-10-CM

## 2024-03-02 DIAGNOSIS — I50.9 CONGESTIVE HEART FAILURE, UNSPECIFIED HF CHRONICITY, UNSPECIFIED HEART FAILURE TYPE (MULTI): ICD-10-CM

## 2024-03-05 RX ORDER — TORSEMIDE 100 MG/1
100 TABLET ORAL DAILY
Qty: 90 TABLET | Refills: 1 | Status: SHIPPED | OUTPATIENT
Start: 2024-03-05 | End: 2024-05-29 | Stop reason: WASHOUT

## 2024-03-15 ENCOUNTER — APPOINTMENT (OUTPATIENT)
Dept: PRIMARY CARE | Facility: CLINIC | Age: 59
End: 2024-03-15
Payer: COMMERCIAL

## 2024-04-16 ENCOUNTER — OFFICE VISIT (OUTPATIENT)
Dept: PRIMARY CARE | Facility: CLINIC | Age: 59
End: 2024-04-16
Payer: COMMERCIAL

## 2024-04-16 VITALS
HEART RATE: 100 BPM | WEIGHT: 210 LBS | DIASTOLIC BLOOD PRESSURE: 77 MMHG | TEMPERATURE: 98.7 F | HEIGHT: 62 IN | OXYGEN SATURATION: 98 % | BODY MASS INDEX: 38.64 KG/M2 | SYSTOLIC BLOOD PRESSURE: 118 MMHG | RESPIRATION RATE: 21 BRPM

## 2024-04-16 DIAGNOSIS — M75.51 BURSITIS OF RIGHT SHOULDER: Primary | ICD-10-CM

## 2024-04-16 PROCEDURE — 3074F SYST BP LT 130 MM HG: CPT | Performed by: FAMILY MEDICINE

## 2024-04-16 PROCEDURE — 3078F DIAST BP <80 MM HG: CPT | Performed by: FAMILY MEDICINE

## 2024-04-16 PROCEDURE — 99213 OFFICE O/P EST LOW 20 MIN: CPT | Performed by: FAMILY MEDICINE

## 2024-04-16 RX ORDER — METHYLPREDNISOLONE 4 MG/1
TABLET ORAL
Qty: 21 TABLET | Refills: 0 | Status: SHIPPED | OUTPATIENT
Start: 2024-04-16 | End: 2024-05-29 | Stop reason: WASHOUT

## 2024-04-16 ASSESSMENT — PAIN SCALES - GENERAL: PAINLEVEL: 0-NO PAIN

## 2024-04-16 ASSESSMENT — ENCOUNTER SYMPTOMS
OCCASIONAL FEELINGS OF UNSTEADINESS: 0
LOSS OF SENSATION IN FEET: 0
DEPRESSION: 0

## 2024-04-16 NOTE — PROGRESS NOTES
"Subjective   Patient ID: Celine Jhaveri is a 59 y.o. female who presents for Shoulder Pain (PATIENT COMPLAINS OF RIGHT SHOULDER PAIN SINCE SATURDAY . NO INJURY OR FALL).    HPI since Friday pm she has been having significant right shoulder pain.  .  Some pain off and on for the last year.  No trauma.      Review of Systems  See HPI  Objective   /77 (BP Location: Right arm, Patient Position: Sitting, BP Cuff Size: Adult)   Pulse 100   Temp 37.1 °C (98.7 °F) (Skin)   Resp 21   Ht 1.575 m (5' 2\")   Wt 95.3 kg (210 lb)   SpO2 98%   BMI 38.41 kg/m²     Physical Exam  Constitutional:       General: She is not in acute distress.     Appearance: Normal appearance.   Cardiovascular:      Rate and Rhythm: Normal rate and regular rhythm.      Heart sounds: No murmur heard.  Pulmonary:      Breath sounds: Normal breath sounds. No wheezing.   Musculoskeletal:      Comments: Right shoulder with some tenderness with abd greater than 80 degrees and internal rotation.    Neurological:      Mental Status: She is alert.       Assessment/Plan   Problem List Items Addressed This Visit    None  Visit Diagnoses         Codes    Bursitis of right shoulder    -  Primary M75.51    Relevant Medications    methylPREDNISolone (Medrol Dospak) 4 mg tablets          Follow up 2 wks if not improved.  Discussed rom exercises.     "

## 2024-04-18 DIAGNOSIS — N17.9 ACUTE KIDNEY FAILURE, UNSPECIFIED (CMS-HCC): Primary | ICD-10-CM

## 2024-04-25 ENCOUNTER — LAB (OUTPATIENT)
Dept: LAB | Facility: LAB | Age: 59
End: 2024-04-25
Payer: COMMERCIAL

## 2024-04-25 DIAGNOSIS — N17.9 ACUTE KIDNEY FAILURE, UNSPECIFIED (CMS-HCC): ICD-10-CM

## 2024-04-25 DIAGNOSIS — J44.1 ACUTE EXACERBATION OF CHRONIC OBSTRUCTIVE PULMONARY DISEASE (MULTI): Primary | ICD-10-CM

## 2024-04-25 LAB
ERYTHROCYTE [DISTWIDTH] IN BLOOD BY AUTOMATED COUNT: 13.8 % (ref 11.5–14.5)
HCT VFR BLD AUTO: 45.6 % (ref 36–46)
HGB BLD-MCNC: 14.9 G/DL (ref 12–16)
MCH RBC QN AUTO: 31.6 PG (ref 26–34)
MCHC RBC AUTO-ENTMCNC: 32.7 G/DL (ref 32–36)
MCV RBC AUTO: 97 FL (ref 80–100)
NRBC BLD-RTO: 0 /100 WBCS (ref 0–0)
PLATELET # BLD AUTO: 208 X10*3/UL (ref 150–450)
RBC # BLD AUTO: 4.72 X10*6/UL (ref 4–5.2)
WBC # BLD AUTO: 7.7 X10*3/UL (ref 4.4–11.3)

## 2024-04-25 PROCEDURE — 85027 COMPLETE CBC AUTOMATED: CPT

## 2024-04-25 PROCEDURE — 36415 COLL VENOUS BLD VENIPUNCTURE: CPT

## 2024-04-25 RX ORDER — MONTELUKAST SODIUM 10 MG/1
10 TABLET ORAL NIGHTLY
Qty: 30 TABLET | Refills: 5 | Status: SHIPPED | OUTPATIENT
Start: 2024-04-25

## 2024-05-03 DIAGNOSIS — E87.6 HYPOKALEMIA: ICD-10-CM

## 2024-05-03 DIAGNOSIS — N17.9 ACUTE KIDNEY FAILURE, UNSPECIFIED (CMS-HCC): Primary | ICD-10-CM

## 2024-05-10 ENCOUNTER — LAB (OUTPATIENT)
Dept: LAB | Facility: LAB | Age: 59
End: 2024-05-10
Payer: COMMERCIAL

## 2024-05-10 DIAGNOSIS — N17.9 ACUTE KIDNEY FAILURE, UNSPECIFIED (CMS-HCC): ICD-10-CM

## 2024-05-10 DIAGNOSIS — E87.6 HYPOKALEMIA: ICD-10-CM

## 2024-05-10 LAB
ALBUMIN SERPL BCP-MCNC: 3.8 G/DL (ref 3.4–5)
ANION GAP SERPL CALC-SCNC: 13 MMOL/L (ref 10–20)
BUN SERPL-MCNC: 15 MG/DL (ref 6–23)
CALCIUM SERPL-MCNC: 8.9 MG/DL (ref 8.6–10.3)
CHLORIDE SERPL-SCNC: 100 MMOL/L (ref 98–107)
CO2 SERPL-SCNC: 28 MMOL/L (ref 21–32)
CREAT SERPL-MCNC: 1.39 MG/DL (ref 0.5–1.05)
EGFRCR SERPLBLD CKD-EPI 2021: 44 ML/MIN/1.73M*2
ERYTHROCYTE [DISTWIDTH] IN BLOOD BY AUTOMATED COUNT: 14.1 % (ref 11.5–14.5)
GLUCOSE SERPL-MCNC: 110 MG/DL (ref 74–99)
HCT VFR BLD AUTO: 42.6 % (ref 36–46)
HGB BLD-MCNC: 13.8 G/DL (ref 12–16)
MAGNESIUM SERPL-MCNC: 1.9 MG/DL (ref 1.6–2.4)
MCH RBC QN AUTO: 31.4 PG (ref 26–34)
MCHC RBC AUTO-ENTMCNC: 32.4 G/DL (ref 32–36)
MCV RBC AUTO: 97 FL (ref 80–100)
NRBC BLD-RTO: 0 /100 WBCS (ref 0–0)
PHOSPHATE SERPL-MCNC: 2.7 MG/DL (ref 2.5–4.9)
PLATELET # BLD AUTO: 160 X10*3/UL (ref 150–450)
POTASSIUM SERPL-SCNC: 4 MMOL/L (ref 3.5–5.3)
RBC # BLD AUTO: 4.4 X10*6/UL (ref 4–5.2)
SODIUM SERPL-SCNC: 137 MMOL/L (ref 136–145)
WBC # BLD AUTO: 5.9 X10*3/UL (ref 4.4–11.3)

## 2024-05-10 PROCEDURE — 36415 COLL VENOUS BLD VENIPUNCTURE: CPT

## 2024-05-10 PROCEDURE — 85027 COMPLETE CBC AUTOMATED: CPT

## 2024-05-10 PROCEDURE — 83735 ASSAY OF MAGNESIUM: CPT

## 2024-05-10 PROCEDURE — 80069 RENAL FUNCTION PANEL: CPT

## 2024-05-29 ENCOUNTER — OFFICE VISIT (OUTPATIENT)
Dept: PRIMARY CARE | Facility: CLINIC | Age: 59
End: 2024-05-29
Payer: COMMERCIAL

## 2024-05-29 VITALS
HEIGHT: 62 IN | DIASTOLIC BLOOD PRESSURE: 76 MMHG | SYSTOLIC BLOOD PRESSURE: 138 MMHG | BODY MASS INDEX: 39.2 KG/M2 | TEMPERATURE: 97.7 F | OXYGEN SATURATION: 97 % | WEIGHT: 213 LBS | HEART RATE: 91 BPM

## 2024-05-29 DIAGNOSIS — J01.00 ACUTE NON-RECURRENT MAXILLARY SINUSITIS: Primary | ICD-10-CM

## 2024-05-29 PROCEDURE — 3075F SYST BP GE 130 - 139MM HG: CPT | Performed by: REGISTERED NURSE

## 2024-05-29 PROCEDURE — 99213 OFFICE O/P EST LOW 20 MIN: CPT | Performed by: REGISTERED NURSE

## 2024-05-29 PROCEDURE — 3078F DIAST BP <80 MM HG: CPT | Performed by: REGISTERED NURSE

## 2024-05-29 PROCEDURE — 4004F PT TOBACCO SCREEN RCVD TLK: CPT | Performed by: REGISTERED NURSE

## 2024-05-29 RX ORDER — DOXYCYCLINE 100 MG/1
100 CAPSULE ORAL 2 TIMES DAILY
Qty: 20 CAPSULE | Refills: 0 | Status: SHIPPED | OUTPATIENT
Start: 2024-05-29 | End: 2024-06-08

## 2024-05-29 ASSESSMENT — ENCOUNTER SYMPTOMS
OCCASIONAL FEELINGS OF UNSTEADINESS: 0
DIAPHORESIS: 0
SINUS PRESSURE: 1
COUGH: 1
DEPRESSION: 0
SHORTNESS OF BREATH: 0
SINUS COMPLAINT: 1
HEADACHES: 1
SWOLLEN GLANDS: 1
CHILLS: 1
SORE THROAT: 1
NECK PAIN: 1
HOARSE VOICE: 0
LOSS OF SENSATION IN FEET: 0

## 2024-05-29 ASSESSMENT — COLUMBIA-SUICIDE SEVERITY RATING SCALE - C-SSRS
6. HAVE YOU EVER DONE ANYTHING, STARTED TO DO ANYTHING, OR PREPARED TO DO ANYTHING TO END YOUR LIFE?: NO
1. IN THE PAST MONTH, HAVE YOU WISHED YOU WERE DEAD OR WISHED YOU COULD GO TO SLEEP AND NOT WAKE UP?: NO
2. HAVE YOU ACTUALLY HAD ANY THOUGHTS OF KILLING YOURSELF?: NO

## 2024-05-29 ASSESSMENT — PATIENT HEALTH QUESTIONNAIRE - PHQ9
1. LITTLE INTEREST OR PLEASURE IN DOING THINGS: NOT AT ALL
2. FEELING DOWN, DEPRESSED OR HOPELESS: NOT AT ALL
SUM OF ALL RESPONSES TO PHQ9 QUESTIONS 1 AND 2: 0

## 2024-05-29 ASSESSMENT — PAIN SCALES - GENERAL: PAINLEVEL: 6

## 2024-05-29 NOTE — PROGRESS NOTES
"Subjective   Patient ID: Celine Jhaveri is a 59 y.o. female who presents for Sinus Problem (Sinus pressure, right ear pain, x 1 week, cough, ).    Sinus Problem  This is a new problem. The current episode started in the past 7 days. The problem has been gradually worsening since onset. There has been no fever. Her pain is at a severity of 6/10. The pain is moderate. Associated symptoms include chills, coughing, ear pain, headaches, neck pain, sinus pressure, sneezing, a sore throat and swollen glands. Pertinent negatives include no congestion, diaphoresis, hoarse voice or shortness of breath. Past treatments include nothing.        Review of Systems   Constitutional:  Positive for chills. Negative for diaphoresis.   HENT:  Positive for ear pain, sinus pressure, sneezing and sore throat. Negative for congestion and hoarse voice.    Respiratory:  Positive for cough. Negative for shortness of breath.    Musculoskeletal:  Positive for neck pain.   Neurological:  Positive for headaches.   All other systems reviewed and are negative.      Objective   /76 (BP Location: Right arm, Patient Position: Sitting, BP Cuff Size: Adult)   Pulse 91   Temp 36.5 °C (97.7 °F) (Temporal)   Ht 1.575 m (5' 2\")   Wt 96.6 kg (213 lb)   SpO2 97%   BMI 38.96 kg/m²     Physical Exam  Vitals reviewed.   Constitutional:       Appearance: Normal appearance.   HENT:      Right Ear: Ear canal and external ear normal.      Left Ear: Ear canal and external ear normal.      Nose: Nose normal.      Mouth/Throat:      Mouth: Mucous membranes are moist.   Pulmonary:      Effort: Pulmonary effort is normal.      Breath sounds: Normal breath sounds.   Neurological:      Mental Status: She is alert.   Psychiatric:         Mood and Affect: Mood normal.         Behavior: Behavior normal.         Assessment/Plan   Problem List Items Addressed This Visit             ICD-10-CM    Sinusitis - Primary J32.9    Relevant Medications    doxycycline " (Vibramycin) 100 mg capsule

## 2024-06-11 DIAGNOSIS — J44.1 ACUTE EXACERBATION OF CHRONIC OBSTRUCTIVE PULMONARY DISEASE (MULTI): ICD-10-CM

## 2024-06-11 DIAGNOSIS — R60.0 LEG EDEMA: ICD-10-CM

## 2024-06-12 RX ORDER — MONTELUKAST SODIUM 10 MG/1
TABLET ORAL
Qty: 90 TABLET | Refills: 1 | Status: SHIPPED | OUTPATIENT
Start: 2024-06-12

## 2024-06-12 RX ORDER — TORSEMIDE 100 MG/1
TABLET ORAL
Qty: 90 TABLET | Refills: 1 | Status: SHIPPED | OUTPATIENT
Start: 2024-06-12

## 2024-07-05 DIAGNOSIS — I10 ESSENTIAL HYPERTENSION: Chronic | ICD-10-CM

## 2024-07-05 RX ORDER — LISINOPRIL 40 MG/1
40 TABLET ORAL DAILY
Qty: 90 TABLET | Refills: 3 | Status: SHIPPED | OUTPATIENT
Start: 2024-07-05

## 2024-07-28 DIAGNOSIS — J44.1 ACUTE EXACERBATION OF CHRONIC OBSTRUCTIVE PULMONARY DISEASE (MULTI): ICD-10-CM

## 2024-07-30 DIAGNOSIS — E78.5 HYPERLIPIDEMIA, UNSPECIFIED HYPERLIPIDEMIA TYPE: ICD-10-CM

## 2024-07-30 DIAGNOSIS — R60.0 LEG EDEMA: ICD-10-CM

## 2024-07-30 RX ORDER — ATORVASTATIN CALCIUM 20 MG/1
20 TABLET, FILM COATED ORAL DAILY
Qty: 90 TABLET | Refills: 3 | Status: SHIPPED | OUTPATIENT
Start: 2024-07-30 | End: 2025-07-30

## 2024-07-30 RX ORDER — POTASSIUM CHLORIDE 1500 MG/1
20 TABLET, EXTENDED RELEASE ORAL DAILY
Qty: 90 TABLET | Refills: 3 | Status: SHIPPED | OUTPATIENT
Start: 2024-07-30 | End: 2025-07-30

## 2024-07-30 RX ORDER — TORSEMIDE 100 MG/1
100 TABLET ORAL DAILY
Qty: 90 TABLET | Refills: 3 | Status: SHIPPED | OUTPATIENT
Start: 2024-07-30 | End: 2025-07-30

## 2024-08-05 ENCOUNTER — OFFICE VISIT (OUTPATIENT)
Dept: PRIMARY CARE | Facility: CLINIC | Age: 59
End: 2024-08-05
Payer: COMMERCIAL

## 2024-08-05 VITALS
WEIGHT: 209 LBS | TEMPERATURE: 97.2 F | HEIGHT: 62 IN | SYSTOLIC BLOOD PRESSURE: 122 MMHG | BODY MASS INDEX: 38.46 KG/M2 | OXYGEN SATURATION: 96 % | DIASTOLIC BLOOD PRESSURE: 88 MMHG | HEART RATE: 70 BPM

## 2024-08-05 DIAGNOSIS — J01.00 ACUTE NON-RECURRENT MAXILLARY SINUSITIS: Primary | ICD-10-CM

## 2024-08-05 DIAGNOSIS — J43.9 PULMONARY EMPHYSEMA, UNSPECIFIED EMPHYSEMA TYPE (MULTI): Chronic | ICD-10-CM

## 2024-08-05 DIAGNOSIS — J44.1 ACUTE EXACERBATION OF CHRONIC OBSTRUCTIVE PULMONARY DISEASE (MULTI): ICD-10-CM

## 2024-08-05 PROCEDURE — 4004F PT TOBACCO SCREEN RCVD TLK: CPT | Performed by: REGISTERED NURSE

## 2024-08-05 PROCEDURE — 3008F BODY MASS INDEX DOCD: CPT | Performed by: REGISTERED NURSE

## 2024-08-05 PROCEDURE — 3074F SYST BP LT 130 MM HG: CPT | Performed by: REGISTERED NURSE

## 2024-08-05 PROCEDURE — 99214 OFFICE O/P EST MOD 30 MIN: CPT | Performed by: REGISTERED NURSE

## 2024-08-05 PROCEDURE — 3079F DIAST BP 80-89 MM HG: CPT | Performed by: REGISTERED NURSE

## 2024-08-05 RX ORDER — BUDESONIDE AND FORMOTEROL FUMARATE DIHYDRATE 160; 4.5 UG/1; UG/1
2 AEROSOL RESPIRATORY (INHALATION)
Qty: 10.2 G | Refills: 11 | Status: SHIPPED | OUTPATIENT
Start: 2024-08-05 | End: 2024-09-04

## 2024-08-05 RX ORDER — AZITHROMYCIN 250 MG/1
TABLET, FILM COATED ORAL
Qty: 6 TABLET | Refills: 0 | Status: SHIPPED | OUTPATIENT
Start: 2024-08-05 | End: 2024-08-10

## 2024-08-05 RX ORDER — MONTELUKAST SODIUM 10 MG/1
10 TABLET ORAL DAILY
Qty: 100 TABLET | Refills: 2 | Status: SHIPPED | OUTPATIENT
Start: 2024-08-05

## 2024-08-05 RX ORDER — ALBUTEROL SULFATE 90 UG/1
2 INHALANT RESPIRATORY (INHALATION) EVERY 6 HOURS PRN
Qty: 18 G | Refills: 11 | Status: SHIPPED | OUTPATIENT
Start: 2024-08-05 | End: 2024-09-04

## 2024-08-05 RX ORDER — IPRATROPIUM BROMIDE AND ALBUTEROL SULFATE 2.5; .5 MG/3ML; MG/3ML
3 SOLUTION RESPIRATORY (INHALATION)
Qty: 360 ML | Refills: 2 | Status: SHIPPED | OUTPATIENT
Start: 2024-08-05 | End: 2024-11-03

## 2024-08-05 RX ORDER — PREDNISONE 20 MG/1
20 TABLET ORAL DAILY
Qty: 5 TABLET | Refills: 0 | Status: SHIPPED | OUTPATIENT
Start: 2024-08-05 | End: 2024-08-10

## 2024-08-05 ASSESSMENT — ENCOUNTER SYMPTOMS
OCCASIONAL FEELINGS OF UNSTEADINESS: 0
LOSS OF SENSATION IN FEET: 0
SINUS PRESSURE: 1
SINUS PAIN: 1
DEPRESSION: 0
COUGH: 1

## 2024-08-05 ASSESSMENT — PAIN SCALES - GENERAL: PAINLEVEL: 5

## 2024-08-05 NOTE — PROGRESS NOTES
"Subjective   Patient ID: Celine Jhaveri is a 59 y.o. female who presents for Sinusitis (Nasal congestion, cough- non productive, ran out of nebulizer medication. Chest congestion, headache, sinus pressure x 4 days ).    HPI   Pt here for refills and exacerbation of COPD  Review of Systems   HENT:  Positive for sinus pressure and sinus pain.    Respiratory:  Positive for cough.    All other systems reviewed and are negative.      Objective   /88 (BP Location: Right arm, Patient Position: Sitting, BP Cuff Size: Adult)   Pulse 70   Temp 36.2 °C (97.2 °F) (Temporal)   Ht 1.575 m (5' 2\")   Wt 94.8 kg (209 lb)   SpO2 96%   BMI 38.23 kg/m²     Physical Exam  Vitals reviewed.   Constitutional:       Appearance: Normal appearance.   Pulmonary:      Effort: Pulmonary effort is normal.      Breath sounds: Wheezing present.   Neurological:      Mental Status: She is alert.   Psychiatric:         Mood and Affect: Mood normal.         Behavior: Behavior normal.         Assessment/Plan   Problem List Items Addressed This Visit             ICD-10-CM    Sinusitis - Primary J32.9    Relevant Medications    azithromycin (Zithromax) 250 mg tablet    predniSONE (Deltasone) 20 mg tablet    Acute exacerbation of chronic obstructive pulmonary disease (Multi) J44.1    Relevant Medications    ipratropium-albuteroL (Duo-Neb) 0.5-2.5 mg/3 mL nebulizer solution    predniSONE (Deltasone) 20 mg tablet    albuterol 90 mcg/actuation inhaler    Symbicort 160-4.5 mcg/actuation inhaler    Pulmonary emphysema (Multi) (Chronic) J43.9    Relevant Medications    predniSONE (Deltasone) 20 mg tablet    albuterol 90 mcg/actuation inhaler    Symbicort 160-4.5 mcg/actuation inhaler          "

## 2024-08-21 ENCOUNTER — HOSPITAL ENCOUNTER (EMERGENCY)
Facility: HOSPITAL | Age: 59
Discharge: HOME | End: 2024-08-21
Payer: COMMERCIAL

## 2024-08-21 ENCOUNTER — APPOINTMENT (OUTPATIENT)
Dept: RADIOLOGY | Facility: HOSPITAL | Age: 59
End: 2024-08-21
Payer: COMMERCIAL

## 2024-08-21 VITALS
BODY MASS INDEX: 34.89 KG/M2 | DIASTOLIC BLOOD PRESSURE: 100 MMHG | OXYGEN SATURATION: 96 % | WEIGHT: 189.6 LBS | TEMPERATURE: 97.9 F | RESPIRATION RATE: 16 BRPM | HEIGHT: 62 IN | HEART RATE: 86 BPM | SYSTOLIC BLOOD PRESSURE: 152 MMHG

## 2024-08-21 DIAGNOSIS — M54.50 ACUTE BILATERAL LOW BACK PAIN WITHOUT SCIATICA: ICD-10-CM

## 2024-08-21 DIAGNOSIS — R10.9 LEFT FLANK PAIN: Primary | ICD-10-CM

## 2024-08-21 LAB
ALBUMIN SERPL-MCNC: 3.9 G/DL (ref 3.5–5)
ALP BLD-CCNC: 120 U/L (ref 35–125)
ALT SERPL-CCNC: 15 U/L (ref 5–40)
ANION GAP SERPL CALC-SCNC: 8 MMOL/L
APPEARANCE UR: CLEAR
AST SERPL-CCNC: 19 U/L (ref 5–40)
BASOPHILS # BLD AUTO: 0.05 X10*3/UL (ref 0–0.1)
BASOPHILS NFR BLD AUTO: 0.7 %
BILIRUB SERPL-MCNC: 0.6 MG/DL (ref 0.1–1.2)
BILIRUB UR STRIP.AUTO-MCNC: NEGATIVE MG/DL
BUN SERPL-MCNC: 15 MG/DL (ref 8–25)
CALCIUM SERPL-MCNC: 9.2 MG/DL (ref 8.5–10.4)
CHLORIDE SERPL-SCNC: 95 MMOL/L (ref 97–107)
CO2 SERPL-SCNC: 28 MMOL/L (ref 24–31)
COLOR UR: NORMAL
CREAT SERPL-MCNC: 1.1 MG/DL (ref 0.4–1.6)
EGFRCR SERPLBLD CKD-EPI 2021: 59 ML/MIN/1.73M*2
EOSINOPHIL # BLD AUTO: 0.15 X10*3/UL (ref 0–0.7)
EOSINOPHIL NFR BLD AUTO: 2.2 %
ERYTHROCYTE [DISTWIDTH] IN BLOOD BY AUTOMATED COUNT: 13.5 % (ref 11.5–14.5)
GLUCOSE SERPL-MCNC: 103 MG/DL (ref 65–99)
GLUCOSE UR STRIP.AUTO-MCNC: NORMAL MG/DL
HCT VFR BLD AUTO: 41.6 % (ref 36–46)
HGB BLD-MCNC: 14.3 G/DL (ref 12–16)
IMM GRANULOCYTES # BLD AUTO: 0.02 X10*3/UL (ref 0–0.7)
IMM GRANULOCYTES NFR BLD AUTO: 0.3 % (ref 0–0.9)
KETONES UR STRIP.AUTO-MCNC: NEGATIVE MG/DL
LEUKOCYTE ESTERASE UR QL STRIP.AUTO: NEGATIVE
LIPASE SERPL-CCNC: 20 U/L (ref 16–63)
LYMPHOCYTES # BLD AUTO: 2.2 X10*3/UL (ref 1.2–4.8)
LYMPHOCYTES NFR BLD AUTO: 32.2 %
MCH RBC QN AUTO: 30.6 PG (ref 26–34)
MCHC RBC AUTO-ENTMCNC: 34.4 G/DL (ref 32–36)
MCV RBC AUTO: 89 FL (ref 80–100)
MONOCYTES # BLD AUTO: 0.61 X10*3/UL (ref 0.1–1)
MONOCYTES NFR BLD AUTO: 8.9 %
NEUTROPHILS # BLD AUTO: 3.81 X10*3/UL (ref 1.2–7.7)
NEUTROPHILS NFR BLD AUTO: 55.7 %
NITRITE UR QL STRIP.AUTO: NEGATIVE
NRBC BLD-RTO: 0 /100 WBCS (ref 0–0)
PH UR STRIP.AUTO: 7 [PH]
PLATELET # BLD AUTO: 148 X10*3/UL (ref 150–450)
POTASSIUM SERPL-SCNC: 3.7 MMOL/L (ref 3.4–5.1)
PROT SERPL-MCNC: 7.4 G/DL (ref 5.9–7.9)
PROT UR STRIP.AUTO-MCNC: NEGATIVE MG/DL
RBC # BLD AUTO: 4.68 X10*6/UL (ref 4–5.2)
RBC # UR STRIP.AUTO: NEGATIVE /UL
SODIUM SERPL-SCNC: 131 MMOL/L (ref 133–145)
SP GR UR STRIP.AUTO: 1.01
UROBILINOGEN UR STRIP.AUTO-MCNC: NORMAL MG/DL
WBC # BLD AUTO: 6.8 X10*3/UL (ref 4.4–11.3)

## 2024-08-21 PROCEDURE — 2500000004 HC RX 250 GENERAL PHARMACY W/ HCPCS (ALT 636 FOR OP/ED)

## 2024-08-21 PROCEDURE — 36415 COLL VENOUS BLD VENIPUNCTURE: CPT

## 2024-08-21 PROCEDURE — 80053 COMPREHEN METABOLIC PANEL: CPT

## 2024-08-21 PROCEDURE — 81003 URINALYSIS AUTO W/O SCOPE: CPT

## 2024-08-21 PROCEDURE — 83690 ASSAY OF LIPASE: CPT

## 2024-08-21 PROCEDURE — 74176 CT ABD & PELVIS W/O CONTRAST: CPT | Performed by: RADIOLOGY

## 2024-08-21 PROCEDURE — 85025 COMPLETE CBC W/AUTO DIFF WBC: CPT

## 2024-08-21 PROCEDURE — 2500000001 HC RX 250 WO HCPCS SELF ADMINISTERED DRUGS (ALT 637 FOR MEDICARE OP)

## 2024-08-21 PROCEDURE — 99284 EMERGENCY DEPT VISIT MOD MDM: CPT | Mod: 25

## 2024-08-21 PROCEDURE — 96374 THER/PROPH/DIAG INJ IV PUSH: CPT

## 2024-08-21 PROCEDURE — 74176 CT ABD & PELVIS W/O CONTRAST: CPT

## 2024-08-21 RX ORDER — ONDANSETRON HYDROCHLORIDE 2 MG/ML
4 INJECTION, SOLUTION INTRAVENOUS ONCE
Status: COMPLETED | OUTPATIENT
Start: 2024-08-21 | End: 2024-08-21

## 2024-08-21 RX ORDER — CYCLOBENZAPRINE HCL 10 MG
10 TABLET ORAL 2 TIMES DAILY PRN
Qty: 20 TABLET | Refills: 0 | Status: SHIPPED | OUTPATIENT
Start: 2024-08-21 | End: 2024-08-31

## 2024-08-21 RX ORDER — TRAMADOL HYDROCHLORIDE 50 MG/1
50 TABLET ORAL ONCE
Status: COMPLETED | OUTPATIENT
Start: 2024-08-21 | End: 2024-08-21

## 2024-08-21 RX ORDER — TRAMADOL HYDROCHLORIDE 50 MG/1
50 TABLET ORAL EVERY 6 HOURS PRN
Qty: 12 TABLET | Refills: 0 | Status: SHIPPED | OUTPATIENT
Start: 2024-08-21 | End: 2024-08-24

## 2024-08-21 RX ORDER — ACETAMINOPHEN 325 MG/1
650 TABLET ORAL ONCE
Status: COMPLETED | OUTPATIENT
Start: 2024-08-21 | End: 2024-08-21

## 2024-08-21 ASSESSMENT — PAIN DESCRIPTION - FREQUENCY: FREQUENCY: CONSTANT/CONTINUOUS

## 2024-08-21 ASSESSMENT — PAIN DESCRIPTION - PROGRESSION: CLINICAL_PROGRESSION: NOT CHANGED

## 2024-08-21 ASSESSMENT — PAIN DESCRIPTION - ORIENTATION: ORIENTATION: LEFT

## 2024-08-21 ASSESSMENT — PAIN SCALES - GENERAL: PAINLEVEL_OUTOF10: 7

## 2024-08-21 ASSESSMENT — PAIN - FUNCTIONAL ASSESSMENT: PAIN_FUNCTIONAL_ASSESSMENT: 0-10

## 2024-08-21 ASSESSMENT — PAIN DESCRIPTION - PAIN TYPE: TYPE: ACUTE PAIN

## 2024-08-21 ASSESSMENT — PAIN DESCRIPTION - ONSET: ONSET: AWAKENED FROM SLEEP

## 2024-08-21 ASSESSMENT — PAIN DESCRIPTION - DESCRIPTORS: DESCRIPTORS: ACHING

## 2024-08-21 NOTE — Clinical Note
Celine Jhaveri was seen and treated in our emergency department on 8/21/2024.  She may return to work on 08/23/2024.       If you have any questions or concerns, please don't hesitate to call.      Kashif Flanagan PA-C

## 2024-08-21 NOTE — ED PROVIDER NOTES
"HPI   Chief Complaint   Patient presents with    Flank Pain     Left sided flamnk pain started last evening, pt also states is urinating a lot        Patient is a 59-year-old female with past medical history of CKD presenting with concerns for low back/left flank pain.  Patient states that this morning, she began to have pain and describes it as a sharp stabbing pain.  Endorses that she is never had pain like this in the past.  Says she has not taken any pain medications since this began.  States she has CKD and is very careful with all of her medications to ensure she does not worsen CKD.  Patient states she does endorse mild nausea without vomiting.  Patient denies fevers, chills, cough, sore throat, runny nose, chest pain, shortness of breath, abdominal pain, vomiting, diarrhea or urinary complaints.              Patient History   Past Medical History:   Diagnosis Date    Anxiety and depression     Bipolar 1 disorder (Multi)     Chronic kidney disease     COPD (chronic obstructive pulmonary disease) (Multi)     HTN (hypertension)     LINDA (obstructive sleep apnea)      Past Surgical History:   Procedure Laterality Date     SECTION, CLASSIC      FOOT Right     GALLBLADDER SURGERY      HYSTERECTOMY      KNEE Right      No family history on file.  Social History     Tobacco Use    Smoking status: Every Day     Current packs/day: 1.00     Average packs/day: 1 pack/day for 46.0 years (46.0 ttl pk-yrs)     Types: Cigarettes    Smokeless tobacco: Never   Vaping Use    Vaping status: Never Used   Substance Use Topics    Alcohol use: Not Currently     Comment: \"recovering addict\"    Drug use: Defer       Physical Exam   ED Triage Vitals [24 1449]   Temperature Heart Rate Respirations BP   36.6 °C (97.9 °F) 88 20 --      Pulse Ox Temp Source Heart Rate Source Patient Position   98 % Oral Monitor Sitting      BP Location FiO2 (%)     Right arm --       Physical Exam  Vitals and nursing note reviewed. "   Constitutional:       Appearance: She is well-developed.      Comments: Awake, laying in examination bed   HENT:      Head: Normocephalic and atraumatic.      Nose: Nose normal.      Mouth/Throat:      Mouth: Mucous membranes are moist.      Pharynx: Oropharynx is clear.   Eyes:      Extraocular Movements: Extraocular movements intact.      Conjunctiva/sclera: Conjunctivae normal.      Pupils: Pupils are equal, round, and reactive to light.   Cardiovascular:      Rate and Rhythm: Normal rate and regular rhythm.      Pulses: Normal pulses.      Heart sounds: Normal heart sounds. No murmur heard.  Pulmonary:      Effort: Pulmonary effort is normal. No respiratory distress.      Breath sounds: Normal breath sounds.   Abdominal:      General: Abdomen is flat.      Palpations: Abdomen is soft.      Tenderness: There is abdominal tenderness.      Comments: Left-sided flank tenderness   Musculoskeletal:         General: No swelling. Normal range of motion.      Cervical back: Normal range of motion and neck supple.   Skin:     General: Skin is warm and dry.      Capillary Refill: Capillary refill takes less than 2 seconds.   Neurological:      General: No focal deficit present.      Mental Status: She is alert and oriented to person, place, and time.   Psychiatric:         Mood and Affect: Mood normal.         Behavior: Behavior normal.           ED Course & MDM   ED Course as of 08/21/24 1917   Wed Aug 21, 2024   0379 I spoke with pharmacist due to patients lengthy allergy list.  After discussion, Tramadol will be ordered due to her allergy list and CKD.   [AR]      ED Course User Index  [AR] Kashif Flanagan PA-C         Diagnoses as of 08/21/24 1917   Left flank pain   Acute bilateral low back pain without sciatica                 No data recorded     [place] Coma Scale Score: 15 (08/21/24 1504 : Kelsie Butcher RN)                           Medical Decision Making  Patient is a 59-year-old female with history of  CKD presenting with concern for low back/left leg pain.  Lab work, urine, imaging ordered.  Conditions considered include but are not limited to: Contusion, kidney stone, UTI, intra-abdominal pathology.    Attending physician was available for consultation on this patient.  CBC is without leukocytosis or anemia.  CMP does show mild hyponatremia with sodium of 131 but is without signs of significant electrolyte abnormality or renal impairment.  Lipase within normal limits.  UA with micro is without infection.  CT scan is without acute finding but does show signs of multiple hernias.    Patient is without administration of tramadol, she is feeling significantly improved.  I believe this patient is at low risk for complication, and a disposition of discharge is acceptable.  Return to the Emergency Department if new or worsening symptoms including headache, fever, chills, chest pain, shortness of breath, syncope, near syncope, abdominal pain, nausea, vomiting,  diarrhea, or worsening pain.  Patient is agreeable to a disposition of discharge with follow-up with primary care in the next several days.  Prescription for tramadol, Flexeril written and discussed with patient that Flexeril may make her tired and I would recommend that she not drive while on this medication.    Portions of this note made with Dragon software, please be mindful of potential grammatical errors.        Medications   ondansetron (Zofran) injection 4 mg (4 mg intravenous Given 8/21/24 1525)   acetaminophen (Tylenol) tablet 650 mg (650 mg oral Given 8/21/24 1546)   traMADol (Ultram) tablet 50 mg (50 mg oral Given 8/21/24 1559)         Labs Reviewed   COMPREHENSIVE METABOLIC PANEL - Abnormal       Result Value    Glucose 103 (*)     Sodium 131 (*)     Potassium 3.7      Chloride 95 (*)     Bicarbonate 28      Urea Nitrogen 15      Creatinine 1.10      eGFR 59 (*)     Calcium 9.2      Albumin 3.9      Alkaline Phosphatase 120      Total Protein 7.4       AST 19      Bilirubin, Total 0.6      ALT 15      Anion Gap 8     CBC WITH AUTO DIFFERENTIAL - Abnormal    WBC 6.8      nRBC 0.0      RBC 4.68      Hemoglobin 14.3      Hematocrit 41.6      MCV 89      MCH 30.6      MCHC 34.4      RDW 13.5      Platelets 148 (*)     Neutrophils % 55.7      Immature Granulocytes %, Automated 0.3      Lymphocytes % 32.2      Monocytes % 8.9      Eosinophils % 2.2      Basophils % 0.7      Neutrophils Absolute 3.81      Immature Granulocytes Absolute, Automated 0.02      Lymphocytes Absolute 2.20      Monocytes Absolute 0.61      Eosinophils Absolute 0.15      Basophils Absolute 0.05     LIPASE - Normal    Lipase 20     URINALYSIS WITH REFLEX CULTURE AND MICROSCOPIC - Normal    Color, Urine Light-Yellow      Appearance, Urine Clear      Specific Gravity, Urine 1.009      pH, Urine 7.0      Protein, Urine NEGATIVE      Glucose, Urine Normal      Blood, Urine NEGATIVE      Ketones, Urine NEGATIVE      Bilirubin, Urine NEGATIVE      Urobilinogen, Urine Normal      Nitrite, Urine NEGATIVE      Leukocyte Esterase, Urine NEGATIVE     URINALYSIS WITH REFLEX CULTURE AND MICROSCOPIC    Narrative:     The following orders were created for panel order Urinalysis with Reflex Culture and Microscopic.  Procedure                               Abnormality         Status                     ---------                               -----------         ------                     Urinalysis with Reflex C...[[id number]]  Normal              Final result               Extra Urine Gray Tube[[id number]]                                                         Please view results for these tests on the individual orders.   EXTRA URINE GRAY TUBE       CT abdomen pelvis wo IV contrast   Final Result   No acute abnormality.        Multiple ventral hernias. Of note there is a supraumbilical ventral   hernia just to the right of midline containing fat and small portion   of colon.        Signed by: Diaz Rascon  8/21/2024 3:35 PM   Dictation workstation:   [id number]            Procedure  Procedures     Kashif Flanagan PA-C  08/21/24 1917

## 2024-08-27 ENCOUNTER — HOSPITAL ENCOUNTER (OUTPATIENT)
Dept: RADIOLOGY | Facility: HOSPITAL | Age: 59
Discharge: HOME | End: 2024-08-27
Payer: COMMERCIAL

## 2024-08-27 DIAGNOSIS — Z72.0 TOBACCO USE: ICD-10-CM

## 2024-08-27 PROCEDURE — 71271 CT THORAX LUNG CANCER SCR C-: CPT

## 2024-08-30 ENCOUNTER — OFFICE VISIT (OUTPATIENT)
Dept: PRIMARY CARE | Facility: CLINIC | Age: 59
End: 2024-08-30
Payer: COMMERCIAL

## 2024-08-30 VITALS
WEIGHT: 206 LBS | BODY MASS INDEX: 37.68 KG/M2 | RESPIRATION RATE: 18 BRPM | HEART RATE: 80 BPM | OXYGEN SATURATION: 99 % | SYSTOLIC BLOOD PRESSURE: 146 MMHG | DIASTOLIC BLOOD PRESSURE: 86 MMHG

## 2024-08-30 DIAGNOSIS — B30.9 ACUTE VIRAL CONJUNCTIVITIS OF LEFT EYE: Primary | ICD-10-CM

## 2024-08-30 DIAGNOSIS — J01.00 SUBACUTE MAXILLARY SINUSITIS: ICD-10-CM

## 2024-08-30 PROCEDURE — 3077F SYST BP >= 140 MM HG: CPT | Performed by: NURSE PRACTITIONER

## 2024-08-30 PROCEDURE — 99213 OFFICE O/P EST LOW 20 MIN: CPT | Performed by: NURSE PRACTITIONER

## 2024-08-30 PROCEDURE — 3079F DIAST BP 80-89 MM HG: CPT | Performed by: NURSE PRACTITIONER

## 2024-08-30 PROCEDURE — 4004F PT TOBACCO SCREEN RCVD TLK: CPT | Performed by: NURSE PRACTITIONER

## 2024-08-30 RX ORDER — POLYMYXIN B SULFATE AND TRIMETHOPRIM 1; 10000 MG/ML; [USP'U]/ML
1 SOLUTION OPHTHALMIC
Qty: 10 ML | Refills: 0 | Status: SHIPPED | OUTPATIENT
Start: 2024-08-30 | End: 2024-08-30

## 2024-08-30 RX ORDER — DOXYCYCLINE 100 MG/1
100 CAPSULE ORAL 2 TIMES DAILY
Qty: 20 CAPSULE | Refills: 0 | Status: SHIPPED | OUTPATIENT
Start: 2024-08-30 | End: 2024-08-30

## 2024-08-30 RX ORDER — POLYMYXIN B SULFATE AND TRIMETHOPRIM 1; 10000 MG/ML; [USP'U]/ML
1 SOLUTION OPHTHALMIC
Qty: 10 ML | Refills: 0 | Status: SHIPPED | OUTPATIENT
Start: 2024-08-30 | End: 2024-09-06

## 2024-08-30 RX ORDER — DOXYCYCLINE 100 MG/1
100 CAPSULE ORAL 2 TIMES DAILY
Qty: 20 CAPSULE | Refills: 0 | Status: SHIPPED | OUTPATIENT
Start: 2024-08-30 | End: 2024-09-09

## 2024-08-30 ASSESSMENT — PAIN SCALES - GENERAL: PAINLEVEL: 5

## 2024-08-30 ASSESSMENT — ENCOUNTER SYMPTOMS
OCCASIONAL FEELINGS OF UNSTEADINESS: 0
DEPRESSION: 0
EYE ITCHING: 1
LOSS OF SENSATION IN FEET: 0
WHEEZING: 1
EYE DISCHARGE: 1

## 2024-08-30 NOTE — PROGRESS NOTES
Subjective   Patient ID: Celine Jhaveri is a 59 y.o. female who presents for Eye Drainage (Patient here with L eye redness, painful and watery).  Suppose to get exam for catarct surgery evaluation on Tuesday, some mild sinus congestion,   HPI     Review of Systems   HENT:  Positive for congestion.    Eyes:  Positive for discharge and itching.   Respiratory:  Positive for wheezing.        Objective   /86 (BP Location: Left arm, Patient Position: Sitting, BP Cuff Size: Adult)   Pulse 80   Resp 18   Wt 93.4 kg (206 lb)   SpO2 99%   BMI 37.68 kg/m²     Physical Exam  Constitutional:       General: She is not in acute distress.     Appearance: Normal appearance.   HENT:      Nose: Congestion present.   Eyes:      General:         Left eye: Discharge present.     Pupils: Pupils are equal, round, and reactive to light.   Cardiovascular:      Rate and Rhythm: Normal rate and regular rhythm.      Heart sounds: No murmur heard.  Pulmonary:      Breath sounds: Normal breath sounds. No wheezing.   Neurological:      Mental Status: She is alert.         Assessment/Plan   Problem List Items Addressed This Visit             ICD-10-CM    Sinusitis J32.9    Relevant Medications    doxycycline (Monodox) 100 mg capsule     Other Visit Diagnoses         Codes    Acute viral conjunctivitis of left eye    -  Primary B30.9    Relevant Medications    polymyxin B sulf-trimethoprim (Polytrim) ophthalmic solution          Pt given printed rx

## 2024-09-03 ENCOUNTER — TELEPHONE (OUTPATIENT)
Dept: PRIMARY CARE | Facility: CLINIC | Age: 59
End: 2024-09-03
Payer: COMMERCIAL

## 2024-09-03 DIAGNOSIS — J44.1 ACUTE EXACERBATION OF CHRONIC OBSTRUCTIVE PULMONARY DISEASE (MULTI): ICD-10-CM

## 2024-09-03 DIAGNOSIS — J43.9 PULMONARY EMPHYSEMA, UNSPECIFIED EMPHYSEMA TYPE (MULTI): Chronic | ICD-10-CM

## 2024-09-03 DIAGNOSIS — J44.1 ACUTE EXACERBATION OF CHRONIC OBSTRUCTIVE PULMONARY DISEASE (MULTI): Primary | ICD-10-CM

## 2024-09-03 RX ORDER — FLUTICASONE PROPIONATE AND SALMETEROL 500; 50 UG/1; UG/1
1 POWDER RESPIRATORY (INHALATION)
Qty: 60 EACH | Refills: 11 | Status: SHIPPED | OUTPATIENT
Start: 2024-09-03 | End: 2025-09-03

## 2024-09-03 RX ORDER — BUDESONIDE AND FORMOTEROL FUMARATE DIHYDRATE 160; 4.5 UG/1; UG/1
2 AEROSOL RESPIRATORY (INHALATION)
Qty: 10.2 G | Refills: 11 | Status: SHIPPED | OUTPATIENT
Start: 2024-09-03 | End: 2024-09-03

## 2024-09-09 DIAGNOSIS — N18.30 CHRONIC KIDNEY DISEASE, STAGE 3 UNSPECIFIED (MULTI): Primary | ICD-10-CM

## 2024-09-13 ENCOUNTER — LAB (OUTPATIENT)
Dept: LAB | Facility: LAB | Age: 59
End: 2024-09-13
Payer: COMMERCIAL

## 2024-09-13 DIAGNOSIS — N18.30 CHRONIC KIDNEY DISEASE, STAGE 3 UNSPECIFIED (MULTI): ICD-10-CM

## 2024-09-13 LAB
ALBUMIN SERPL BCP-MCNC: 3.4 G/DL (ref 3.4–5)
ANION GAP SERPL CALC-SCNC: 10 MMOL/L (ref 10–20)
BUN SERPL-MCNC: 15 MG/DL (ref 6–23)
CALCIUM SERPL-MCNC: 8.3 MG/DL (ref 8.6–10.3)
CHLORIDE SERPL-SCNC: 102 MMOL/L (ref 98–107)
CO2 SERPL-SCNC: 29 MMOL/L (ref 21–32)
CREAT SERPL-MCNC: 1.2 MG/DL (ref 0.5–1.05)
EGFRCR SERPLBLD CKD-EPI 2021: 53 ML/MIN/1.73M*2
ERYTHROCYTE [DISTWIDTH] IN BLOOD BY AUTOMATED COUNT: 13.9 % (ref 11.5–14.5)
GLUCOSE SERPL-MCNC: 120 MG/DL (ref 74–99)
HCT VFR BLD AUTO: 39.6 % (ref 36–46)
HGB BLD-MCNC: 13.3 G/DL (ref 12–16)
MCH RBC QN AUTO: 30.6 PG (ref 26–34)
MCHC RBC AUTO-ENTMCNC: 33.6 G/DL (ref 32–36)
MCV RBC AUTO: 91 FL (ref 80–100)
NRBC BLD-RTO: 0 /100 WBCS (ref 0–0)
PHOSPHATE SERPL-MCNC: 3 MG/DL (ref 2.5–4.9)
PLATELET # BLD AUTO: 162 X10*3/UL (ref 150–450)
POTASSIUM SERPL-SCNC: 3.8 MMOL/L (ref 3.5–5.3)
RBC # BLD AUTO: 4.35 X10*6/UL (ref 4–5.2)
SODIUM SERPL-SCNC: 137 MMOL/L (ref 136–145)
WBC # BLD AUTO: 6.2 X10*3/UL (ref 4.4–11.3)

## 2024-09-13 PROCEDURE — 83970 ASSAY OF PARATHORMONE: CPT

## 2024-09-13 PROCEDURE — 80069 RENAL FUNCTION PANEL: CPT

## 2024-09-13 PROCEDURE — 82306 VITAMIN D 25 HYDROXY: CPT

## 2024-09-13 PROCEDURE — 85027 COMPLETE CBC AUTOMATED: CPT

## 2024-09-13 PROCEDURE — 36415 COLL VENOUS BLD VENIPUNCTURE: CPT

## 2024-09-14 LAB
25(OH)D3 SERPL-MCNC: 29 NG/ML (ref 30–100)
PTH-INTACT SERPL-MCNC: 68.1 PG/ML (ref 18.5–88)

## 2024-09-20 ENCOUNTER — LAB (OUTPATIENT)
Dept: LAB | Facility: LAB | Age: 59
End: 2024-09-20
Payer: COMMERCIAL

## 2024-09-20 DIAGNOSIS — R35.0 FREQUENCY OF MICTURITION: Primary | ICD-10-CM

## 2024-09-20 LAB
APPEARANCE UR: CLEAR
BILIRUB UR STRIP.AUTO-MCNC: NEGATIVE MG/DL
COLOR UR: YELLOW
GLUCOSE UR STRIP.AUTO-MCNC: NEGATIVE MG/DL
KETONES UR STRIP.AUTO-MCNC: NEGATIVE MG/DL
LEUKOCYTE ESTERASE UR QL STRIP.AUTO: NEGATIVE
NITRITE UR QL STRIP.AUTO: NEGATIVE
PH UR STRIP.AUTO: 6 [PH]
PROT UR STRIP.AUTO-MCNC: NEGATIVE MG/DL
RBC # UR STRIP.AUTO: NEGATIVE /UL
SP GR UR STRIP.AUTO: 1.02
UROBILINOGEN UR STRIP.AUTO-MCNC: NORMAL MG/DL

## 2024-09-20 PROCEDURE — 81003 URINALYSIS AUTO W/O SCOPE: CPT

## 2024-10-25 ENCOUNTER — OFFICE VISIT (OUTPATIENT)
Dept: PRIMARY CARE | Facility: CLINIC | Age: 59
End: 2024-10-25
Payer: COMMERCIAL

## 2024-10-25 VITALS
DIASTOLIC BLOOD PRESSURE: 82 MMHG | HEART RATE: 84 BPM | OXYGEN SATURATION: 98 % | WEIGHT: 223.6 LBS | SYSTOLIC BLOOD PRESSURE: 148 MMHG | HEIGHT: 62 IN | RESPIRATION RATE: 18 BRPM | BODY MASS INDEX: 41.15 KG/M2 | TEMPERATURE: 96.6 F

## 2024-10-25 DIAGNOSIS — R53.83 OTHER FATIGUE: ICD-10-CM

## 2024-10-25 DIAGNOSIS — Z01.818 PREOP EXAMINATION: ICD-10-CM

## 2024-10-25 DIAGNOSIS — Z12.11 ENCOUNTER FOR SCREENING FOR MALIGNANT NEOPLASM OF COLON: ICD-10-CM

## 2024-10-25 DIAGNOSIS — Z00.00 WELL ADULT EXAM: ICD-10-CM

## 2024-10-25 DIAGNOSIS — Z00.00 ANNUAL PHYSICAL EXAM: Primary | ICD-10-CM

## 2024-10-25 DIAGNOSIS — R07.9 CHEST PAIN, UNSPECIFIED TYPE: ICD-10-CM

## 2024-10-25 DIAGNOSIS — Z12.31 ENCOUNTER FOR SCREENING MAMMOGRAM FOR MALIGNANT NEOPLASM OF BREAST: ICD-10-CM

## 2024-10-25 LAB
ALBUMIN SERPL BCP-MCNC: 3.6 G/DL (ref 3.4–5)
ALP SERPL-CCNC: 106 U/L (ref 33–110)
ALT SERPL W P-5'-P-CCNC: 16 U/L (ref 7–45)
ANION GAP SERPL CALCULATED.3IONS-SCNC: 10 MMOL/L (ref 10–20)
AST SERPL W P-5'-P-CCNC: 19 U/L (ref 9–39)
BASOPHILS # BLD AUTO: 0.08 X10*3/UL (ref 0–0.1)
BASOPHILS NFR BLD AUTO: 1.4 %
BILIRUB SERPL-MCNC: 0.5 MG/DL (ref 0–1.2)
BUN SERPL-MCNC: 10 MG/DL (ref 6–23)
CALCIUM SERPL-MCNC: 8.7 MG/DL (ref 8.6–10.3)
CHLORIDE SERPL-SCNC: 98 MMOL/L (ref 98–107)
CHOLEST SERPL-MCNC: 149 MG/DL (ref 0–199)
CHOLEST/HDLC SERPL: 2.2 {RATIO}
CO2 SERPL-SCNC: 27 MMOL/L (ref 21–32)
CREAT SERPL-MCNC: 1.03 MG/DL (ref 0.5–1.05)
EGFRCR SERPLBLD CKD-EPI 2021: 64 ML/MIN/1.73M*2
EOSINOPHIL # BLD AUTO: 0.21 X10*3/UL (ref 0–0.7)
EOSINOPHIL NFR BLD AUTO: 3.6 %
ERYTHROCYTE [DISTWIDTH] IN BLOOD BY AUTOMATED COUNT: 14.3 % (ref 11.5–14.5)
GLUCOSE SERPL-MCNC: 82 MG/DL (ref 74–99)
HCT VFR BLD AUTO: 39.1 % (ref 36–46)
HDLC SERPL-MCNC: 69.1 MG/DL
HGB BLD-MCNC: 13.5 G/DL (ref 12–16)
IMM GRANULOCYTES # BLD AUTO: 0.01 X10*3/UL (ref 0–0.7)
IMM GRANULOCYTES NFR BLD AUTO: 0.2 % (ref 0–0.9)
LDLC SERPL CALC-MCNC: 68 MG/DL
LYMPHOCYTES # BLD AUTO: 1.52 X10*3/UL (ref 1.2–4.8)
LYMPHOCYTES NFR BLD AUTO: 26.3 %
MCH RBC QN AUTO: 30.8 PG (ref 26–34)
MCHC RBC AUTO-ENTMCNC: 34.5 G/DL (ref 32–36)
MCV RBC AUTO: 89 FL (ref 80–100)
MONOCYTES # BLD AUTO: 0.59 X10*3/UL (ref 0.1–1)
MONOCYTES NFR BLD AUTO: 10.2 %
NEUTROPHILS # BLD AUTO: 3.36 X10*3/UL (ref 1.2–7.7)
NEUTROPHILS NFR BLD AUTO: 58.3 %
NON HDL CHOLESTEROL: 80 MG/DL (ref 0–149)
NRBC BLD-RTO: 0 /100 WBCS (ref 0–0)
PLATELET # BLD AUTO: 193 X10*3/UL (ref 150–450)
POTASSIUM SERPL-SCNC: 4 MMOL/L (ref 3.5–5.3)
PROT SERPL-MCNC: 6.7 G/DL (ref 6.4–8.2)
RBC # BLD AUTO: 4.38 X10*6/UL (ref 4–5.2)
SODIUM SERPL-SCNC: 131 MMOL/L (ref 136–145)
TRIGL SERPL-MCNC: 59 MG/DL (ref 0–149)
TSH SERPL-ACNC: 1.2 MIU/L (ref 0.44–3.98)
VLDL: 12 MG/DL (ref 0–40)
WBC # BLD AUTO: 5.8 X10*3/UL (ref 4.4–11.3)

## 2024-10-25 PROCEDURE — 93010 ELECTROCARDIOGRAM REPORT: CPT | Performed by: FAMILY MEDICINE

## 2024-10-25 PROCEDURE — 80061 LIPID PANEL: CPT | Performed by: FAMILY MEDICINE

## 2024-10-25 PROCEDURE — 3079F DIAST BP 80-89 MM HG: CPT | Performed by: FAMILY MEDICINE

## 2024-10-25 PROCEDURE — 36415 COLL VENOUS BLD VENIPUNCTURE: CPT | Performed by: FAMILY MEDICINE

## 2024-10-25 PROCEDURE — 99396 PREV VISIT EST AGE 40-64: CPT | Performed by: FAMILY MEDICINE

## 2024-10-25 PROCEDURE — 84443 ASSAY THYROID STIM HORMONE: CPT | Performed by: FAMILY MEDICINE

## 2024-10-25 PROCEDURE — 80053 COMPREHEN METABOLIC PANEL: CPT | Performed by: FAMILY MEDICINE

## 2024-10-25 PROCEDURE — 4004F PT TOBACCO SCREEN RCVD TLK: CPT | Performed by: FAMILY MEDICINE

## 2024-10-25 PROCEDURE — 85025 COMPLETE CBC W/AUTO DIFF WBC: CPT | Performed by: FAMILY MEDICINE

## 2024-10-25 PROCEDURE — 3077F SYST BP >= 140 MM HG: CPT | Performed by: FAMILY MEDICINE

## 2024-10-25 PROCEDURE — 3008F BODY MASS INDEX DOCD: CPT | Performed by: FAMILY MEDICINE

## 2024-10-25 ASSESSMENT — ACTIVITIES OF DAILY LIVING (ADL)
MANAGING_FINANCES: INDEPENDENT
BATHING: INDEPENDENT
DOING_HOUSEWORK: INDEPENDENT
DRESSING: INDEPENDENT
GROCERY_SHOPPING: INDEPENDENT
TAKING_MEDICATION: INDEPENDENT

## 2024-10-25 ASSESSMENT — PAIN SCALES - GENERAL: PAINLEVEL_OUTOF10: 5

## 2024-10-25 NOTE — PROGRESS NOTES
"Subjective   Patient ID: Celine Jhaveri is a 59 y.o. female who presents for Pre-op Exam.    HPI has sore on left groin wants checked    Review of Systems    Objective   /82   Pulse 84   Temp 35.9 °C (96.6 °F)   Resp 18   Ht 1.575 m (5' 2\")   Wt 101 kg (223 lb 9.6 oz)   SpO2 98%   BMI 40.90 kg/m²     Physical Exam  Vitals and nursing note reviewed.   Constitutional:       General: She is not in acute distress.  HENT:      Right Ear: Tympanic membrane and ear canal normal.      Left Ear: Tympanic membrane and ear canal normal.      Nose: Nose normal. No rhinorrhea.      Mouth/Throat:      Pharynx: Oropharynx is clear. No oropharyngeal exudate or posterior oropharyngeal erythema.      Comments: Dentition wnl  Eyes:      Extraocular Movements: Extraocular movements intact.      Conjunctiva/sclera: Conjunctivae normal.      Pupils: Pupils are equal, round, and reactive to light.   Neck:      Vascular: No carotid bruit.   Cardiovascular:      Rate and Rhythm: Normal rate and regular rhythm.      Heart sounds: Normal heart sounds. No murmur heard.  Pulmonary:      Breath sounds: Normal breath sounds. No wheezing or rhonchi.   Abdominal:      General: Bowel sounds are normal. There is no distension.      Palpations: Abdomen is soft. There is no mass.      Tenderness: There is no abdominal tenderness. There is no guarding or rebound.      Hernia: No hernia is present.   Musculoskeletal:         General: No swelling or tenderness. Normal range of motion.      Cervical back: Normal range of motion and neck supple.   Lymphadenopathy:      Cervical: No cervical adenopathy.   Skin:     General: Skin is warm.      Findings: No rash.   Neurological:      General: No focal deficit present.      Mental Status: She is alert.       Assessment/Plan   Problem List Items Addressed This Visit    None  Visit Diagnoses         Codes    Annual physical exam    -  Primary Z00.00    Relevant Orders    Comprehensive Metabolic " Panel    Lipid Panel    TSH with reflex to Free T4 if abnormal    Other fatigue     R53.83    Relevant Orders    CBC and Auto Differential    Encounter for screening mammogram for malignant neoplasm of breast     Z12.31    Relevant Orders    BI mammo bilateral screening tomosynthesis    Encounter for screening for malignant neoplasm of colon     Z12.11    Relevant Orders    Cologuard® colon cancer screening    Preop examination     Z01.818        Cleared for surgery from a primary care standpoint

## 2024-10-25 NOTE — PROGRESS NOTES
"Subjective   Patient ID: Celine Jhaveri is a 59 y.o. female who presents for Pre-op Exam.    HPI pt is fasting     Review of Systems    Objective   /82   Pulse 84   Temp 35.9 °C (96.6 °F)   Resp 18   Ht 1.575 m (5' 2\")   Wt 101 kg (223 lb 9.6 oz)   SpO2 98%   BMI 40.90 kg/m²     Physical Exam    Assessment/Plan          "

## 2024-10-29 DIAGNOSIS — N18.30 CHRONIC KIDNEY DISEASE, STAGE 3 UNSPECIFIED (MULTI): Primary | ICD-10-CM

## 2024-11-05 DIAGNOSIS — N18.30 CHRONIC KIDNEY DISEASE, STAGE 3 UNSPECIFIED (MULTI): Primary | ICD-10-CM

## 2024-11-07 ENCOUNTER — LAB (OUTPATIENT)
Dept: LAB | Facility: LAB | Age: 59
End: 2024-11-07
Payer: COMMERCIAL

## 2024-11-07 DIAGNOSIS — N18.30 CHRONIC KIDNEY DISEASE, STAGE 3 UNSPECIFIED (MULTI): ICD-10-CM

## 2024-11-07 LAB
ALBUMIN SERPL BCP-MCNC: 3.7 G/DL (ref 3.4–5)
ANION GAP SERPL CALC-SCNC: 12 MMOL/L (ref 10–20)
BUN SERPL-MCNC: 14 MG/DL (ref 6–23)
CALCIUM SERPL-MCNC: 8.7 MG/DL (ref 8.6–10.3)
CHLORIDE SERPL-SCNC: 96 MMOL/L (ref 98–107)
CO2 SERPL-SCNC: 27 MMOL/L (ref 21–32)
CREAT SERPL-MCNC: 1.03 MG/DL (ref 0.5–1.05)
EGFRCR SERPLBLD CKD-EPI 2021: 64 ML/MIN/1.73M*2
GLUCOSE SERPL-MCNC: 93 MG/DL (ref 74–99)
PHOSPHATE SERPL-MCNC: 3.3 MG/DL (ref 2.5–4.9)
POTASSIUM SERPL-SCNC: 3.8 MMOL/L (ref 3.5–5.3)
SODIUM SERPL-SCNC: 131 MMOL/L (ref 136–145)

## 2024-11-07 PROCEDURE — 80069 RENAL FUNCTION PANEL: CPT

## 2024-11-07 PROCEDURE — 36415 COLL VENOUS BLD VENIPUNCTURE: CPT

## 2024-11-14 ENCOUNTER — OFFICE VISIT (OUTPATIENT)
Dept: PRIMARY CARE | Facility: CLINIC | Age: 59
End: 2024-11-14
Payer: COMMERCIAL

## 2024-11-14 VITALS — HEART RATE: 84 BPM | OXYGEN SATURATION: 97 % | SYSTOLIC BLOOD PRESSURE: 144 MMHG | DIASTOLIC BLOOD PRESSURE: 95 MMHG

## 2024-11-14 DIAGNOSIS — J41.8 MIXED SIMPLE AND MUCOPURULENT CHRONIC BRONCHITIS (MULTI): Primary | ICD-10-CM

## 2024-11-14 PROCEDURE — 99213 OFFICE O/P EST LOW 20 MIN: CPT | Performed by: NURSE PRACTITIONER

## 2024-11-14 PROCEDURE — 3080F DIAST BP >= 90 MM HG: CPT | Performed by: NURSE PRACTITIONER

## 2024-11-14 PROCEDURE — 3077F SYST BP >= 140 MM HG: CPT | Performed by: NURSE PRACTITIONER

## 2024-11-14 PROCEDURE — 99417 PROLNG OP E/M EACH 15 MIN: CPT | Performed by: NURSE PRACTITIONER

## 2024-11-14 RX ORDER — DOXYCYCLINE 100 MG/1
100 CAPSULE ORAL 2 TIMES DAILY
Qty: 20 CAPSULE | Refills: 0 | Status: SHIPPED | OUTPATIENT
Start: 2024-11-14 | End: 2024-11-24

## 2024-11-14 RX ORDER — PREDNISONE 20 MG/1
TABLET ORAL
Qty: 20 TABLET | Refills: 0 | Status: SHIPPED | OUTPATIENT
Start: 2024-11-14 | End: 2024-11-26

## 2024-11-14 RX ORDER — BENZONATATE 100 MG/1
100 CAPSULE ORAL 3 TIMES DAILY PRN
Qty: 42 CAPSULE | Refills: 0 | Status: SHIPPED | OUTPATIENT
Start: 2024-11-14 | End: 2024-12-14

## 2024-11-14 ASSESSMENT — ENCOUNTER SYMPTOMS
SHORTNESS OF BREATH: 1
COUGH: 1
LOSS OF SENSATION IN FEET: 0
DEPRESSION: 0
OCCASIONAL FEELINGS OF UNSTEADINESS: 0

## 2024-11-14 ASSESSMENT — PAIN SCALES - GENERAL: PAINLEVEL_OUTOF10: 6

## 2024-11-14 NOTE — PROGRESS NOTES
Subjective   Patient ID: Celine Jhaveri is a 59 y.o. female who presents for Cough and Sinusitis.    Cough  Associated symptoms include shortness of breath.   Sinusitis  Associated symptoms include coughing and shortness of breath.        Review of Systems   Respiratory:  Positive for cough and shortness of breath.    Short of breath one week   Having COPD flare and having catarct surgery next week     Objective   BP (!) 144/95 (BP Location: Left arm, Patient Position: Sitting, BP Cuff Size: Adult)   Pulse 84   SpO2 97%     Physical Exam  Constitutional:       General: She is not in acute distress.     Appearance: Normal appearance.   Cardiovascular:      Rate and Rhythm: Normal rate and regular rhythm.      Heart sounds: No murmur heard.  Pulmonary:      Breath sounds: Wheezing and rhonchi present.   Neurological:      Mental Status: She is alert.         Assessment/Plan   Problem List Items Addressed This Visit             ICD-10-CM    Chronic obstructive pulmonary disease (Multi) - Primary J44.9    Relevant Medications    doxycycline (Monodox) 100 mg capsule    predniSONE (Deltasone) 20 mg tablet    benzonatate (Tessalon) 100 mg capsule

## 2024-11-25 ENCOUNTER — LAB (OUTPATIENT)
Dept: LAB | Facility: LAB | Age: 59
End: 2024-11-25
Payer: COMMERCIAL

## 2024-11-25 DIAGNOSIS — E87.1 HYPO-OSMOLALITY AND HYPONATREMIA: Primary | ICD-10-CM

## 2024-11-25 LAB
ANION GAP SERPL CALC-SCNC: 11 MMOL/L (ref 10–20)
BUN SERPL-MCNC: 25 MG/DL (ref 6–23)
CALCIUM SERPL-MCNC: 8.8 MG/DL (ref 8.6–10.3)
CHLORIDE SERPL-SCNC: 99 MMOL/L (ref 98–107)
CO2 SERPL-SCNC: 31 MMOL/L (ref 21–32)
CREAT SERPL-MCNC: 1.34 MG/DL (ref 0.5–1.05)
EGFRCR SERPLBLD CKD-EPI 2021: 46 ML/MIN/1.73M*2
GLUCOSE SERPL-MCNC: 178 MG/DL (ref 74–99)
POTASSIUM SERPL-SCNC: 4.2 MMOL/L (ref 3.5–5.3)
SODIUM SERPL-SCNC: 137 MMOL/L (ref 136–145)

## 2024-11-25 PROCEDURE — 80048 BASIC METABOLIC PNL TOTAL CA: CPT

## 2024-11-25 PROCEDURE — 83930 ASSAY OF BLOOD OSMOLALITY: CPT

## 2024-11-25 PROCEDURE — 36415 COLL VENOUS BLD VENIPUNCTURE: CPT

## 2024-11-25 PROCEDURE — 84300 ASSAY OF URINE SODIUM: CPT

## 2024-11-25 PROCEDURE — 83935 ASSAY OF URINE OSMOLALITY: CPT

## 2024-11-25 PROCEDURE — 82570 ASSAY OF URINE CREATININE: CPT

## 2024-11-26 DIAGNOSIS — N18.30 CHRONIC KIDNEY DISEASE, STAGE 3 UNSPECIFIED (MULTI): Primary | ICD-10-CM

## 2024-11-26 LAB
CREAT UR-MCNC: 71.9 MG/DL (ref 20–320)
OSMOLALITY SERPL: 298 MOSM/KG (ref 280–300)
OSMOLALITY UR: 629 MOSM/KG (ref 200–1200)
SODIUM UR-SCNC: 14 MMOL/L
SODIUM/CREAT UR-RTO: 19 MMOL/G CREAT

## 2024-12-12 DIAGNOSIS — I10 ESSENTIAL HYPERTENSION: Chronic | ICD-10-CM

## 2024-12-12 RX ORDER — LISINOPRIL 40 MG/1
40 TABLET ORAL DAILY
Qty: 90 TABLET | Refills: 3 | Status: SHIPPED | OUTPATIENT
Start: 2024-12-12

## 2025-02-18 DIAGNOSIS — I10 ESSENTIAL HYPERTENSION: Chronic | ICD-10-CM

## 2025-02-18 RX ORDER — LISINOPRIL 40 MG/1
40 TABLET ORAL DAILY
Qty: 90 TABLET | Refills: 3 | Status: SHIPPED | OUTPATIENT
Start: 2025-02-18

## 2025-03-11 ENCOUNTER — OFFICE VISIT (OUTPATIENT)
Dept: PRIMARY CARE | Facility: CLINIC | Age: 60
End: 2025-03-11
Payer: COMMERCIAL

## 2025-03-11 VITALS
RESPIRATION RATE: 18 BRPM | OXYGEN SATURATION: 97 % | HEART RATE: 76 BPM | BODY MASS INDEX: 38.63 KG/M2 | SYSTOLIC BLOOD PRESSURE: 132 MMHG | DIASTOLIC BLOOD PRESSURE: 82 MMHG | WEIGHT: 211.2 LBS

## 2025-03-11 DIAGNOSIS — I10 ESSENTIAL HYPERTENSION: Primary | Chronic | ICD-10-CM

## 2025-03-11 DIAGNOSIS — R35.0 URINE FREQUENCY: ICD-10-CM

## 2025-03-11 DIAGNOSIS — N18.9 CHRONIC KIDNEY DISEASE, UNSPECIFIED CKD STAGE: Primary | ICD-10-CM

## 2025-03-11 LAB
POC APPEARANCE, URINE: CLEAR
POC BILIRUBIN, URINE: NEGATIVE
POC BLOOD, URINE: ABNORMAL
POC COLOR, URINE: YELLOW
POC GLUCOSE, URINE: ABNORMAL MG/DL
POC KETONES, URINE: NEGATIVE MG/DL
POC LEUKOCYTES, URINE: NEGATIVE
POC NITRITE,URINE: NEGATIVE
POC PH, URINE: 6 PH
POC PROTEIN, URINE: NEGATIVE MG/DL
POC SPECIFIC GRAVITY, URINE: 1.01
POC UROBILINOGEN, URINE: 0.2 EU/DL

## 2025-03-11 PROCEDURE — 99213 OFFICE O/P EST LOW 20 MIN: CPT | Performed by: FAMILY MEDICINE

## 2025-03-11 PROCEDURE — 81003 URINALYSIS AUTO W/O SCOPE: CPT | Performed by: FAMILY MEDICINE

## 2025-03-11 PROCEDURE — 3075F SYST BP GE 130 - 139MM HG: CPT | Performed by: FAMILY MEDICINE

## 2025-03-11 PROCEDURE — 3079F DIAST BP 80-89 MM HG: CPT | Performed by: FAMILY MEDICINE

## 2025-03-11 RX ORDER — LISINOPRIL 10 MG/1
10 TABLET ORAL DAILY
Qty: 100 TABLET | Refills: 3 | Status: SHIPPED | OUTPATIENT
Start: 2025-03-11 | End: 2026-04-15

## 2025-03-11 ASSESSMENT — ENCOUNTER SYMPTOMS
DEPRESSION: 0
OCCASIONAL FEELINGS OF UNSTEADINESS: 0
HYPERTENSION: 1
LOSS OF SENSATION IN FEET: 0

## 2025-03-11 ASSESSMENT — PAIN SCALES - GENERAL: PAINLEVEL_OUTOF10: 0-NO PAIN

## 2025-03-11 NOTE — PROGRESS NOTES
Subjective   Patient ID: Celine Jhaveri is a 59 y.o. female who presents for No chief complaint on file..    HPI     Review of Systems    Objective   There were no vitals taken for this visit.    Physical Exam    Assessment/Plan

## 2025-03-11 NOTE — PROGRESS NOTES
Subjective   Patient ID: Celine Jhaveri is a 59 y.o. female who presents for Hypertension (Pt states her bp has been bouncing around and mainly on high side but does get some low readings. ).    Hypertension         Review of Systems    Objective   /82   Pulse 76   Resp 18   Wt 95.8 kg (211 lb 3.2 oz)   SpO2 97%   BMI 38.63 kg/m²     Physical Exam  Constitutional:       General: She is not in acute distress.     Appearance: Normal appearance.   Cardiovascular:      Rate and Rhythm: Normal rate and regular rhythm.      Heart sounds: No murmur heard.  Pulmonary:      Breath sounds: Normal breath sounds. No wheezing.   Neurological:      Mental Status: She is alert.         Assessment/Plan   Problem List Items Addressed This Visit             ICD-10-CM    Essential hypertension - Primary (Chronic) I10    Relevant Medications    lisinopril 10 mg tablet        Cont to monitor bp and give me numbers in 10-14 days

## 2025-03-13 LAB
ALBUMIN SERPL-MCNC: 3.9 G/DL (ref 3.6–5.1)
ALP SERPL-CCNC: 93 U/L (ref 37–153)
ALT SERPL-CCNC: 14 U/L (ref 6–29)
ANION GAP SERPL CALCULATED.4IONS-SCNC: 14 MMOL/L (CALC) (ref 7–17)
AST SERPL-CCNC: 17 U/L (ref 10–35)
BACTERIA UR CULT: NORMAL
BILIRUB SERPL-MCNC: 0.9 MG/DL (ref 0.2–1.2)
BUN SERPL-MCNC: 30 MG/DL (ref 7–25)
CALCIUM SERPL-MCNC: 8.7 MG/DL (ref 8.6–10.4)
CHLORIDE SERPL-SCNC: 98 MMOL/L (ref 98–110)
CO2 SERPL-SCNC: 27 MMOL/L (ref 20–32)
CREAT SERPL-MCNC: 2.09 MG/DL (ref 0.5–1.03)
EGFRCR SERPLBLD CKD-EPI 2021: 27 ML/MIN/1.73M2
GLUCOSE SERPL-MCNC: 133 MG/DL (ref 65–99)
POTASSIUM SERPL-SCNC: 3.1 MMOL/L (ref 3.5–5.3)
PROT SERPL-MCNC: 6.9 G/DL (ref 6.1–8.1)
SODIUM SERPL-SCNC: 139 MMOL/L (ref 135–146)

## 2025-03-17 DIAGNOSIS — N19 RENAL FAILURE, UNSPECIFIED CHRONICITY: Primary | ICD-10-CM

## 2025-03-17 RX ORDER — DAPAGLIFLOZIN 10 MG/1
10 TABLET, FILM COATED ORAL DAILY
Qty: 90 TABLET | Refills: 1 | Status: SHIPPED | OUTPATIENT
Start: 2025-03-17

## 2025-03-17 RX ORDER — DAPAGLIFLOZIN 10 MG/1
10 TABLET, FILM COATED ORAL DAILY
COMMUNITY
End: 2025-03-17 | Stop reason: SDUPTHER

## 2025-03-26 ENCOUNTER — TELEPHONE (OUTPATIENT)
Dept: PRIMARY CARE | Facility: CLINIC | Age: 60
End: 2025-03-26
Payer: COMMERCIAL

## 2025-03-26 DIAGNOSIS — N39.0 URINARY TRACT INFECTION WITHOUT HEMATURIA, SITE UNSPECIFIED: Primary | ICD-10-CM

## 2025-03-26 RX ORDER — NITROFURANTOIN 25; 75 MG/1; MG/1
100 CAPSULE ORAL 2 TIMES DAILY
Qty: 6 CAPSULE | Refills: 0 | Status: SHIPPED | OUTPATIENT
Start: 2025-03-26 | End: 2025-03-29

## 2025-04-02 ENCOUNTER — APPOINTMENT (OUTPATIENT)
Dept: RADIOLOGY | Facility: HOSPITAL | Age: 60
End: 2025-04-02
Payer: COMMERCIAL

## 2025-04-02 ENCOUNTER — HOSPITAL ENCOUNTER (EMERGENCY)
Facility: HOSPITAL | Age: 60
Discharge: HOME | End: 2025-04-02
Payer: COMMERCIAL

## 2025-04-02 VITALS
SYSTOLIC BLOOD PRESSURE: 135 MMHG | WEIGHT: 208.11 LBS | OXYGEN SATURATION: 96 % | RESPIRATION RATE: 18 BRPM | TEMPERATURE: 97.9 F | HEART RATE: 87 BPM | BODY MASS INDEX: 38.3 KG/M2 | DIASTOLIC BLOOD PRESSURE: 76 MMHG | HEIGHT: 62 IN

## 2025-04-02 DIAGNOSIS — L03.011 CELLULITIS OF FINGER OF RIGHT HAND: ICD-10-CM
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
## 2025-04-02 LAB
ALBUMIN SERPL BCP-MCNC: 4 G/DL (ref 3.4–5)
ALP SERPL-CCNC: 102 U/L (ref 33–110)
ALT SERPL W P-5'-P-CCNC: 16 U/L (ref 7–45)
ANION GAP SERPL CALCULATED.3IONS-SCNC: 12 MMOL/L (ref 10–20)
AST SERPL W P-5'-P-CCNC: 21 U/L (ref 9–39)
BASOPHILS # BLD AUTO: 0.08 X10*3/UL (ref 0–0.1)
BASOPHILS NFR BLD AUTO: 0.9 %
BILIRUB SERPL-MCNC: 0.9 MG/DL (ref 0–1.2)
BUN SERPL-MCNC: 20 MG/DL (ref 6–23)
CALCIUM SERPL-MCNC: 9.6 MG/DL (ref 8.6–10.3)
CHLORIDE SERPL-SCNC: 100 MMOL/L (ref 98–107)
CO2 SERPL-SCNC: 29 MMOL/L (ref 21–32)
CREAT SERPL-MCNC: 1.54 MG/DL (ref 0.5–1.05)
EGFRCR SERPLBLD CKD-EPI 2021: 39 ML/MIN/1.73M*2
EOSINOPHIL # BLD AUTO: 0.1 X10*3/UL (ref 0–0.7)
EOSINOPHIL NFR BLD AUTO: 1.2 %
ERYTHROCYTE [DISTWIDTH] IN BLOOD BY AUTOMATED COUNT: 14.2 % (ref 11.5–14.5)
GLUCOSE SERPL-MCNC: 132 MG/DL (ref 74–99)
HCT VFR BLD AUTO: 43.9 % (ref 36–46)
HGB BLD-MCNC: 14.9 G/DL (ref 12–16)
IMM GRANULOCYTES # BLD AUTO: 0.03 X10*3/UL (ref 0–0.7)
IMM GRANULOCYTES NFR BLD AUTO: 0.4 % (ref 0–0.9)
LYMPHOCYTES # BLD AUTO: 1.76 X10*3/UL (ref 1.2–4.8)
LYMPHOCYTES NFR BLD AUTO: 20.6 %
MCH RBC QN AUTO: 32 PG (ref 26–34)
MCHC RBC AUTO-ENTMCNC: 33.9 G/DL (ref 32–36)
MCV RBC AUTO: 94 FL (ref 80–100)
MONOCYTES # BLD AUTO: 0.7 X10*3/UL (ref 0.1–1)
MONOCYTES NFR BLD AUTO: 8.2 %
NEUTROPHILS # BLD AUTO: 5.89 X10*3/UL (ref 1.2–7.7)
NEUTROPHILS NFR BLD AUTO: 68.7 %
NRBC BLD-RTO: 0 /100 WBCS (ref 0–0)
PLATELET # BLD AUTO: 162 X10*3/UL (ref 150–450)
POTASSIUM SERPL-SCNC: 3.1 MMOL/L (ref 3.5–5.3)
PROT SERPL-MCNC: 7.6 G/DL (ref 6.4–8.2)
RBC # BLD AUTO: 4.66 X10*6/UL (ref 4–5.2)
SODIUM SERPL-SCNC: 138 MMOL/L (ref 136–145)
WBC # BLD AUTO: 8.6 X10*3/UL (ref 4.4–11.3)

## 2025-04-02 PROCEDURE — 85025 COMPLETE CBC W/AUTO DIFF WBC: CPT | Performed by: PHYSICIAN ASSISTANT

## 2025-04-02 PROCEDURE — 80053 COMPREHEN METABOLIC PANEL: CPT | Performed by: PHYSICIAN ASSISTANT

## 2025-04-02 PROCEDURE — 36415 COLL VENOUS BLD VENIPUNCTURE: CPT | Performed by: PHYSICIAN ASSISTANT

## 2025-04-02 PROCEDURE — 96366 THER/PROPH/DIAG IV INF ADDON: CPT

## 2025-04-02 PROCEDURE — 90715 TDAP VACCINE 7 YRS/> IM: CPT | Performed by: PHYSICIAN ASSISTANT

## 2025-04-02 PROCEDURE — 96365 THER/PROPH/DIAG IV INF INIT: CPT

## 2025-04-02 PROCEDURE — 90471 IMMUNIZATION ADMIN: CPT | Performed by: PHYSICIAN ASSISTANT

## 2025-04-02 PROCEDURE — 2500000004 HC RX 250 GENERAL PHARMACY W/ HCPCS (ALT 636 FOR OP/ED): Performed by: PHYSICIAN ASSISTANT

## 2025-04-02 PROCEDURE — 99284 EMERGENCY DEPT VISIT MOD MDM: CPT | Mod: 25

## 2025-04-02 PROCEDURE — 73130 X-RAY EXAM OF HAND: CPT | Mod: RIGHT SIDE | Performed by: RADIOLOGY

## 2025-04-02 PROCEDURE — 73130 X-RAY EXAM OF HAND: CPT | Mod: RT

## 2025-04-02 RX ORDER — CLINDAMYCIN HYDROCHLORIDE 150 MG/1
450 CAPSULE ORAL 3 TIMES DAILY
Qty: 63 CAPSULE | Refills: 0 | Status: SHIPPED | OUTPATIENT
Start: 2025-04-02 | End: 2025-04-09

## 2025-04-02 RX ORDER — POTASSIUM CHLORIDE 20 MEQ/1
40 TABLET, EXTENDED RELEASE ORAL ONCE
Status: DISCONTINUED | OUTPATIENT
Start: 2025-04-02 | End: 2025-04-02 | Stop reason: HOSPADM

## 2025-04-02 RX ORDER — CLINDAMYCIN PHOSPHATE 900 MG/50ML
900 INJECTION, SOLUTION INTRAVENOUS ONCE
Status: COMPLETED | OUTPATIENT
Start: 2025-04-02 | End: 2025-04-02

## 2025-04-02 RX ADMIN — SODIUM CHLORIDE 1000 ML: 9 INJECTION, SOLUTION INTRAVENOUS at 12:44

## 2025-04-02 RX ADMIN — TETANUS TOXOID, REDUCED DIPHTHERIA TOXOID AND ACELLULAR PERTUSSIS VACCINE, ADSORBED 0.5 ML: 5; 2.5; 8; 8; 2.5 SUSPENSION INTRAMUSCULAR at 12:47

## 2025-04-02 RX ADMIN — CLINDAMYCIN PHOSPHATE 900 MG: 900 INJECTION, SOLUTION INTRAVENOUS at 12:47

## 2025-04-02 ASSESSMENT — PAIN DESCRIPTION - PROGRESSION: CLINICAL_PROGRESSION: GRADUALLY WORSENING

## 2025-04-02 ASSESSMENT — PAIN DESCRIPTION - FREQUENCY: FREQUENCY: CONSTANT/CONTINUOUS

## 2025-04-02 ASSESSMENT — PAIN DESCRIPTION - ONSET: ONSET: GRADUAL

## 2025-04-02 ASSESSMENT — PAIN DESCRIPTION - LOCATION: LOCATION: FINGER (COMMENT WHICH ONE)

## 2025-04-02 ASSESSMENT — PAIN DESCRIPTION - PAIN TYPE: TYPE: ACUTE PAIN

## 2025-04-02 ASSESSMENT — PAIN DESCRIPTION - DESCRIPTORS: DESCRIPTORS: ACHING

## 2025-04-02 ASSESSMENT — PAIN - FUNCTIONAL ASSESSMENT: PAIN_FUNCTIONAL_ASSESSMENT: 0-10

## 2025-04-02 ASSESSMENT — PAIN DESCRIPTION - ORIENTATION: ORIENTATION: RIGHT

## 2025-04-02 ASSESSMENT — PAIN SCALES - GENERAL: PAINLEVEL_OUTOF10: 4

## 2025-04-02 NOTE — ED PROVIDER NOTES
"HPI   Chief Complaint   Patient presents with    Hand Pain     Bit by cat on Monday, swelling and discharge from right middle finger, no fevers, complaints isolated to hand , has ring on        HPI  60-year-old female coming in for right middle finger injury.  Patient was bitten by a cat on Monday, has pain redness and swelling.  Patient also has a ring on this finger that she cannot get off.          Patient History   Past Medical History:   Diagnosis Date    Anxiety and depression     Bipolar 1 disorder (Multi)     Chronic kidney disease     COPD (chronic obstructive pulmonary disease) (Multi)     HTN (hypertension)     LINDA (obstructive sleep apnea)      Past Surgical History:   Procedure Laterality Date     SECTION, CLASSIC      FOOT Right     GALLBLADDER SURGERY      HYSTERECTOMY      KNEE Right      No family history on file.  Social History     Tobacco Use    Smoking status: Every Day     Current packs/day: 1.00     Average packs/day: 1 pack/day for 46.0 years (46.0 ttl pk-yrs)     Types: Cigarettes    Smokeless tobacco: Never   Vaping Use    Vaping status: Never Used   Substance Use Topics    Alcohol use: Not Currently     Comment: \"recovering addict\"    Drug use: Defer       Physical Exam   ED Triage Vitals [25 1237]   Temperature Heart Rate Respirations BP   36.6 °C (97.9 °F) 87 18 135/76      Pulse Ox Temp Source Heart Rate Source Patient Position   96 % Oral Monitor Sitting      BP Location FiO2 (%)     Right arm --       Physical Exam  GENERAL APPEARANCE: This patient is in no acute respiratory distress. Awake and alert.talking appropriately. Answering questions appropriately. No evidence of pressured speech     VITAL SIGNS: As per the nurses' triage record.     HEENT: Normocephalic, atraumatic.     NECK:  full gross ROM during exam    MUSCULOSKELETAL: Patient's right middle finger mildly swollen, 2 small puncture wounds to the palmar surface between the MCP and PIP joint, some swelling " diffusely along this finger.  A ring is in place and is unable to be removed through traditional means.  Distal sensation motor function cap refill normal.  Small scratch to the distal anterior right forearm also related to the cat.  No other area of pain or injury reported full gross active range of motion. Ambulating on own with no acute difficulties     NEUROLOGICAL: Awake, alert and oriented x 3.    IMMUNOLOGICAL: No palpable lymphadenopathy or lymphatic streaking noted on visible skin.    DERM: No petechiae, rashes, or ecchymoses. on visible skin    PSYCH: mood and affect appear normal.      ED Course & MDM   ED Course as of 04/02/25 1404   Wed Apr 02, 2025   1243 The patient's ring has been successfully cut off by nursing staff, neurovascular status appropriate after removal.  Ring was given back to the patient. [AP]   1342 Creatinine(!): 1.54  Slightly up from baseline. [AP]      ED Course User Index  [AP] Kendall Rowe, AMANDA         Diagnoses as of 04/02/25 1404   Cat bite, initial encounter   Cellulitis of finger of right hand                 No data recorded                                 Medical Decision Making  Parts of this chart have been completed using voice recognition software. Please excuse any errors of transcription.  My thought process and reason for plan has been formulated from the time that I saw the patient until the time of disposition and is not specific to one specific moment during their visit and furthermore my MDM encompasses this entire chart and not only this text box.      HPI: Detailed above.    Exam: A medically appropriate exam performed, outlined above, given the known history and presentation.    History Limited by: Nothing    History obtained from: Patient    External/internal records reviewed: No external record reviewed    Social Determinants of Health considered during this visit: Lives at    Chronic conditions impacting care: Denies    Medications given during  visit:  Medications   potassium chloride CR (Klor-Con M20) ER tablet 40 mEq (has no administration in time range)   sodium chloride 0.9 % bolus 1,000 mL (1,000 mL intravenous New Bag 4/2/25 1244)   clindamycin (Cleocin) 900 mg in dextrose 5% IV 50 mL (900 mg intravenous New Bag 4/2/25 1247)   diphth,pertus(acell),tetanus (BoostRIX) 2.5-8-5 Lf-mcg-Lf/0.5mL vaccine 0.5 mL (0.5 mL intramuscular Given 4/2/25 1247)        Diagnostic/tests  Labs Reviewed   COMPREHENSIVE METABOLIC PANEL - Abnormal       Result Value    Glucose 132 (*)     Sodium 138      Potassium 3.1 (*)     Chloride 100      Bicarbonate 29      Anion Gap 12      Urea Nitrogen 20      Creatinine 1.54 (*)     eGFR 39 (*)     Calcium 9.6      Albumin 4.0      Alkaline Phosphatase 102      Total Protein 7.6      AST 21      Bilirubin, Total 0.9      ALT 16     CBC WITH AUTO DIFFERENTIAL    WBC 8.6      nRBC 0.0      RBC 4.66      Hemoglobin 14.9      Hematocrit 43.9      MCV 94      MCH 32.0      MCHC 33.9      RDW 14.2      Platelets 162      Neutrophils % 68.7      Immature Granulocytes %, Automated 0.4      Lymphocytes % 20.6      Monocytes % 8.2      Eosinophils % 1.2      Basophils % 0.9      Neutrophils Absolute 5.89      Immature Granulocytes Absolute, Automated 0.03      Lymphocytes Absolute 1.76      Monocytes Absolute 0.70      Eosinophils Absolute 0.10      Basophils Absolute 0.08        XR hand right 3+ views   Final Result   No acute osseous findings of the right hand.        MACRO:   None        Signed by: Etienne Galo 4/2/2025 1:52 PM   Dictation workstation:   [id number]             Considerations/further MDM:  I estimate there is low risk for systemic infection requiring admission, I have further considered: Cellulitis, compartment syndrome, necrotizing fasciitis, gangrene, foreign object, neurovascular compromise, tendon dysfunction, arterial involvement, fracture, dislocation, thus I consider the discharge disposition reasonable. Also,  there is no evidence for peritonitis, sepsis, or toxicity. We have discussed the diagnosis and risks, and we agree with discharging home to follow-up with their primary doctor. We also discussed returning to the Emergency Department immediately if new or worsening symptoms occur. We have discussed the symptoms which are most concerning (e.g., changing or worsening pain, fever, numbness, weakness, cool or painful digits) that necessitate immediate return.    Patient has reassuring labs and diagnostics, no sign of osteomyelitis, clinically no sign of tenosynovitis.  The patient will be treated for basic cellulitis, very clear return precautions and follow-up instructions discussed.      Procedure  Procedures     Kendall Rowe PA-C  04/02/25 1402

## 2025-04-02 NOTE — DISCHARGE INSTRUCTIONS
Be sure to follow up as directed in 1-2 days.  All of the details of your follow up instructions are detailed in the follow up section of this packet.     If you develop new different worsening pains or symptoms return immediately to the emergency department.    It is important to remember that your care does not end here and you must continue to monitor your condition closely. Please return to the emergency department for any worsening or concerning signs or symptoms as directed by our conversations and the discharge instructions. Otherwise please follow up with your doctor in 2 days if no better or worse. If you do not have a doctor please contact the referral number on your discharge instructions. Please contact any physician specialists provided in your discharge notes as it is very important to follow up with them regarding your condition. If you are unable to reach the physicians provided, please come back to the Emergency Department at any time.        Return to emergency room without delay for ANY new or worsening pains or for any other symptoms or concerns.

## 2025-04-04 ENCOUNTER — HOSPITAL ENCOUNTER (OUTPATIENT)
Dept: RADIOLOGY | Facility: HOSPITAL | Age: 60
Discharge: HOME | End: 2025-04-04
Payer: COMMERCIAL

## 2025-04-04 DIAGNOSIS — N17.9 ACUTE KIDNEY FAILURE, UNSPECIFIED: ICD-10-CM

## 2025-04-04 PROCEDURE — 76770 US EXAM ABDO BACK WALL COMP: CPT

## 2025-04-05 ENCOUNTER — LAB REQUISITION (OUTPATIENT)
Dept: LAB | Facility: HOSPITAL | Age: 60
End: 2025-04-05
Payer: COMMERCIAL

## 2025-04-05 DIAGNOSIS — N18.30 CHRONIC KIDNEY DISEASE, STAGE 3 UNSPECIFIED (MULTI): ICD-10-CM

## 2025-04-05 DIAGNOSIS — N17.9 ACUTE KIDNEY FAILURE, UNSPECIFIED: ICD-10-CM

## 2025-04-05 LAB
ALBUMIN SERPL BCP-MCNC: 3.6 G/DL (ref 3.4–5)
ANION GAP SERPL CALCULATED.3IONS-SCNC: 13 MMOL/L (ref 10–20)
APPEARANCE UR: CLEAR
BILIRUB UR STRIP.AUTO-MCNC: NEGATIVE MG/DL
BUN SERPL-MCNC: 22 MG/DL (ref 6–23)
CALCIUM SERPL-MCNC: 8.9 MG/DL (ref 8.6–10.3)
CHLORIDE SERPL-SCNC: 98 MMOL/L (ref 98–107)
CO2 SERPL-SCNC: 32 MMOL/L (ref 21–32)
COLOR UR: YELLOW
CREAT SERPL-MCNC: 1.59 MG/DL (ref 0.5–1.05)
EGFRCR SERPLBLD CKD-EPI 2021: 38 ML/MIN/1.73M*2
ERYTHROCYTE [DISTWIDTH] IN BLOOD BY AUTOMATED COUNT: 14.7 % (ref 11.5–14.5)
GLUCOSE SERPL-MCNC: 73 MG/DL (ref 74–99)
GLUCOSE UR STRIP.AUTO-MCNC: ABNORMAL MG/DL
HCT VFR BLD AUTO: 41.2 % (ref 36–46)
HGB BLD-MCNC: 13.6 G/DL (ref 12–16)
HOLD SPECIMEN: NORMAL
KETONES UR STRIP.AUTO-MCNC: NEGATIVE MG/DL
LEUKOCYTE ESTERASE UR QL STRIP.AUTO: NEGATIVE
MCH RBC QN AUTO: 32 PG (ref 26–34)
MCHC RBC AUTO-ENTMCNC: 33 G/DL (ref 32–36)
MCV RBC AUTO: 97 FL (ref 80–100)
NITRITE UR QL STRIP.AUTO: NEGATIVE
NRBC BLD-RTO: 0 /100 WBCS (ref 0–0)
PH UR STRIP.AUTO: 6 [PH]
PHOSPHATE SERPL-MCNC: 3.4 MG/DL (ref 2.5–4.9)
PLATELET # BLD AUTO: 165 X10*3/UL (ref 150–450)
POTASSIUM SERPL-SCNC: 3.1 MMOL/L (ref 3.5–5.3)
PROT UR STRIP.AUTO-MCNC: NEGATIVE MG/DL
RBC # BLD AUTO: 4.25 X10*6/UL (ref 4–5.2)
RBC # UR STRIP.AUTO: NEGATIVE MG/DL
SODIUM SERPL-SCNC: 140 MMOL/L (ref 136–145)
SP GR UR STRIP.AUTO: 1.01
UROBILINOGEN UR STRIP.AUTO-MCNC: NORMAL MG/DL
WBC # BLD AUTO: 5.2 X10*3/UL (ref 4.4–11.3)

## 2025-04-05 PROCEDURE — 81003 URINALYSIS AUTO W/O SCOPE: CPT

## 2025-04-05 PROCEDURE — 85027 COMPLETE CBC AUTOMATED: CPT

## 2025-04-05 PROCEDURE — 36415 COLL VENOUS BLD VENIPUNCTURE: CPT

## 2025-04-05 PROCEDURE — 80069 RENAL FUNCTION PANEL: CPT

## 2025-04-07 ENCOUNTER — TELEPHONE (OUTPATIENT)
Dept: PRIMARY CARE | Facility: CLINIC | Age: 60
End: 2025-04-07
Payer: COMMERCIAL

## 2025-04-07 DIAGNOSIS — N39.0 URINARY TRACT INFECTION WITHOUT HEMATURIA, SITE UNSPECIFIED: Primary | ICD-10-CM

## 2025-04-08 RX ORDER — SULFAMETHOXAZOLE AND TRIMETHOPRIM 800; 160 MG/1; MG/1
1 TABLET ORAL 2 TIMES DAILY
Qty: 14 TABLET | Refills: 0 | Status: SHIPPED | OUTPATIENT
Start: 2025-04-08 | End: 2025-04-15

## 2025-05-06 ENCOUNTER — APPOINTMENT (OUTPATIENT)
Dept: RADIOLOGY | Facility: HOSPITAL | Age: 60
End: 2025-05-06
Payer: COMMERCIAL

## 2025-05-06 ENCOUNTER — HOSPITAL ENCOUNTER (EMERGENCY)
Facility: HOSPITAL | Age: 60
Discharge: HOME | End: 2025-05-06
Payer: COMMERCIAL

## 2025-05-06 VITALS
DIASTOLIC BLOOD PRESSURE: 85 MMHG | BODY MASS INDEX: 38.26 KG/M2 | HEIGHT: 62 IN | RESPIRATION RATE: 20 BRPM | TEMPERATURE: 98.1 F | HEART RATE: 82 BPM | WEIGHT: 207.89 LBS | OXYGEN SATURATION: 94 % | SYSTOLIC BLOOD PRESSURE: 143 MMHG

## 2025-05-06 DIAGNOSIS — R19.7 NAUSEA VOMITING AND DIARRHEA: ICD-10-CM

## 2025-05-06 DIAGNOSIS — A08.4 VIRAL GASTROENTERITIS: Primary | ICD-10-CM

## 2025-05-06 DIAGNOSIS — R11.2 NAUSEA VOMITING AND DIARRHEA: ICD-10-CM

## 2025-05-06 LAB
ALBUMIN SERPL BCP-MCNC: 3.6 G/DL (ref 3.4–5)
ALP SERPL-CCNC: 93 U/L (ref 33–110)
ALT SERPL W P-5'-P-CCNC: 12 U/L (ref 7–45)
ANION GAP SERPL CALCULATED.3IONS-SCNC: 12 MMOL/L (ref 10–20)
APPEARANCE UR: CLEAR
AST SERPL W P-5'-P-CCNC: 17 U/L (ref 9–39)
BASOPHILS # BLD AUTO: 0.08 X10*3/UL (ref 0–0.1)
BASOPHILS NFR BLD AUTO: 1.2 %
BILIRUB SERPL-MCNC: 1 MG/DL (ref 0–1.2)
BILIRUB UR STRIP.AUTO-MCNC: NEGATIVE MG/DL
BUN SERPL-MCNC: 14 MG/DL (ref 6–23)
CALCIUM SERPL-MCNC: 8.9 MG/DL (ref 8.6–10.3)
CHLORIDE SERPL-SCNC: 101 MMOL/L (ref 98–107)
CO2 SERPL-SCNC: 27 MMOL/L (ref 21–32)
COLOR UR: YELLOW
CREAT SERPL-MCNC: 1.26 MG/DL (ref 0.5–1.05)
EGFRCR SERPLBLD CKD-EPI 2021: 50 ML/MIN/1.73M*2
EOSINOPHIL # BLD AUTO: 0.14 X10*3/UL (ref 0–0.7)
EOSINOPHIL NFR BLD AUTO: 2.1 %
ERYTHROCYTE [DISTWIDTH] IN BLOOD BY AUTOMATED COUNT: 13.7 % (ref 11.5–14.5)
GLUCOSE SERPL-MCNC: 99 MG/DL (ref 74–99)
GLUCOSE UR STRIP.AUTO-MCNC: NORMAL MG/DL
HCT VFR BLD AUTO: 40.9 % (ref 36–46)
HGB BLD-MCNC: 13.7 G/DL (ref 12–16)
IMM GRANULOCYTES # BLD AUTO: 0.01 X10*3/UL (ref 0–0.7)
IMM GRANULOCYTES NFR BLD AUTO: 0.1 % (ref 0–0.9)
KETONES UR STRIP.AUTO-MCNC: NEGATIVE MG/DL
LEUKOCYTE ESTERASE UR QL STRIP.AUTO: NEGATIVE
LIPASE SERPL-CCNC: 6 U/L (ref 9–82)
LYMPHOCYTES # BLD AUTO: 2.52 X10*3/UL (ref 1.2–4.8)
LYMPHOCYTES NFR BLD AUTO: 37.4 %
MCH RBC QN AUTO: 31.6 PG (ref 26–34)
MCHC RBC AUTO-ENTMCNC: 33.5 G/DL (ref 32–36)
MCV RBC AUTO: 95 FL (ref 80–100)
MONOCYTES # BLD AUTO: 0.59 X10*3/UL (ref 0.1–1)
MONOCYTES NFR BLD AUTO: 8.8 %
NEUTROPHILS # BLD AUTO: 3.39 X10*3/UL (ref 1.2–7.7)
NEUTROPHILS NFR BLD AUTO: 50.4 %
NITRITE UR QL STRIP.AUTO: NEGATIVE
NRBC BLD-RTO: 0 /100 WBCS (ref 0–0)
PH UR STRIP.AUTO: 6.5 [PH]
PLATELET # BLD AUTO: 143 X10*3/UL (ref 150–450)
PLATELET CLUMP BLD QL SMEAR: PRESENT
POTASSIUM SERPL-SCNC: 3.3 MMOL/L (ref 3.5–5.3)
PROT SERPL-MCNC: 6.9 G/DL (ref 6.4–8.2)
PROT UR STRIP.AUTO-MCNC: NEGATIVE MG/DL
RBC # BLD AUTO: 4.33 X10*6/UL (ref 4–5.2)
RBC # UR STRIP.AUTO: NEGATIVE MG/DL
RBC MORPH BLD: NORMAL
SODIUM SERPL-SCNC: 137 MMOL/L (ref 136–145)
SP GR UR STRIP.AUTO: 1.01
UROBILINOGEN UR STRIP.AUTO-MCNC: ABNORMAL MG/DL
WBC # BLD AUTO: 6.7 X10*3/UL (ref 4.4–11.3)

## 2025-05-06 PROCEDURE — 74176 CT ABD & PELVIS W/O CONTRAST: CPT

## 2025-05-06 PROCEDURE — 81003 URINALYSIS AUTO W/O SCOPE: CPT

## 2025-05-06 PROCEDURE — 36415 COLL VENOUS BLD VENIPUNCTURE: CPT

## 2025-05-06 PROCEDURE — 2500000004 HC RX 250 GENERAL PHARMACY W/ HCPCS (ALT 636 FOR OP/ED): Mod: JZ

## 2025-05-06 PROCEDURE — 74176 CT ABD & PELVIS W/O CONTRAST: CPT | Performed by: STUDENT IN AN ORGANIZED HEALTH CARE EDUCATION/TRAINING PROGRAM

## 2025-05-06 PROCEDURE — 96374 THER/PROPH/DIAG INJ IV PUSH: CPT

## 2025-05-06 PROCEDURE — 84075 ASSAY ALKALINE PHOSPHATASE: CPT

## 2025-05-06 PROCEDURE — 96361 HYDRATE IV INFUSION ADD-ON: CPT

## 2025-05-06 PROCEDURE — 85025 COMPLETE CBC W/AUTO DIFF WBC: CPT

## 2025-05-06 PROCEDURE — 83690 ASSAY OF LIPASE: CPT

## 2025-05-06 PROCEDURE — 99284 EMERGENCY DEPT VISIT MOD MDM: CPT | Mod: 25

## 2025-05-06 RX ORDER — ONDANSETRON 4 MG/1
4 TABLET, ORALLY DISINTEGRATING ORAL EVERY 8 HOURS PRN
Qty: 20 TABLET | Refills: 0 | Status: SHIPPED | OUTPATIENT
Start: 2025-05-06 | End: 2025-05-13

## 2025-05-06 RX ORDER — ONDANSETRON HYDROCHLORIDE 2 MG/ML
4 INJECTION, SOLUTION INTRAVENOUS ONCE
Status: COMPLETED | OUTPATIENT
Start: 2025-05-06 | End: 2025-05-06

## 2025-05-06 RX ADMIN — ONDANSETRON 4 MG: 2 INJECTION, SOLUTION INTRAMUSCULAR; INTRAVENOUS at 15:09

## 2025-05-06 RX ADMIN — SODIUM CHLORIDE 1000 ML: 900 INJECTION, SOLUTION INTRAVENOUS at 15:09

## 2025-05-06 ASSESSMENT — COLUMBIA-SUICIDE SEVERITY RATING SCALE - C-SSRS
1. IN THE PAST MONTH, HAVE YOU WISHED YOU WERE DEAD OR WISHED YOU COULD GO TO SLEEP AND NOT WAKE UP?: NO
2. HAVE YOU ACTUALLY HAD ANY THOUGHTS OF KILLING YOURSELF?: NO
6. HAVE YOU EVER DONE ANYTHING, STARTED TO DO ANYTHING, OR PREPARED TO DO ANYTHING TO END YOUR LIFE?: NO

## 2025-05-06 ASSESSMENT — PAIN SCALES - GENERAL: PAINLEVEL_OUTOF10: 6

## 2025-05-06 ASSESSMENT — PAIN - FUNCTIONAL ASSESSMENT: PAIN_FUNCTIONAL_ASSESSMENT: 0-10

## 2025-05-06 NOTE — DISCHARGE INSTRUCTIONS
Increase oral hydration.  Utilize Imodium only if it is causing significant impedance due to the activity.  Antinausea medicine as needed.  Likely viral illness.  Follow with your primary provider.    Be sure to take all medications, over the counter medications or prescription medications only as directed.    Be sure to follow up as directed in 1-2 days. All of the details of your follow up instructions are detailed in the follow up section of this packet.    If you are being discharged with any pains medications or muscle relaxers (norco, Vicodin, hydrocodone products, Percocet, oxycodone products, flexeril, cyclobenzaprine, robaxin, norflex, brand or generic, or any other pain controlling medications with the exception of Ibuprofen and regular Tylenol, do not drive or operate machinery, climb ladders or participate in any activity that could potentially put yourself or others at risk should you get dizzy, or be/feel impaired at all.    Return to emergency room without delay for ANY new or worsening pains or for any other symptoms or concerns. Return with worsening pains, nausea, vomiting, trouble breathing, palpitations, shortness of breath, inability to pass stool or urine, loss of control of stool or urine, any numbness or tingling (that is not normal for you), uncontrolled fevers, the passing of blood or other material in stool or urine, rashes, pains or for any other symptoms or concerns you may have. You are always welcome to return to the ER at any time for any reason or for any other concerns you may have.

## 2025-05-06 NOTE — ED PROVIDER NOTES
HPI   Chief Complaint   Patient presents with    Diarrhea     Pt complains of diarrhea since Friday, vomiting since last night, headache, dizziness.        Patient is a 60-year-old female with past medical history of CKD stage III presenting with concerns for diarrhea, vomiting, headache and dizziness.  Reportedly, diarrhea was onset first on Friday of last week.  Has had a hard time keeping anything down because she has been vomiting.  Has a history of cholecystectomy and multiple  sections as well as hysterectomy.  Endorses bilateral flank discomfort.  No history of kidney stones.  Endorses a difficult time on setting her urine stream which is a chronic problem for her.  Patient denies fevers, chills, cough, sore throat, runny nose, chest pain, shortness of breath, abdominal pain, or urinary complaints.              Patient History   Medical History[1]  Surgical History[2]  Family History[3]  Social History[4]    Physical Exam   ED Triage Vitals [25 1448]   Temperature Heart Rate Respirations BP   36.7 °C (98.1 °F) 93 18 154/84      Pulse Ox Temp src Heart Rate Source Patient Position   95 % -- -- --      BP Location FiO2 (%)     -- --       Physical Exam  Vitals and nursing note reviewed.   Constitutional:       Appearance: She is well-developed.      Comments: Awake, laying in examination bed   HENT:      Head: Normocephalic and atraumatic.      Nose: Nose normal.      Mouth/Throat:      Mouth: Mucous membranes are moist.      Pharynx: Oropharynx is clear.   Eyes:      Extraocular Movements: Extraocular movements intact.      Conjunctiva/sclera: Conjunctivae normal.      Pupils: Pupils are equal, round, and reactive to light.   Cardiovascular:      Rate and Rhythm: Normal rate and regular rhythm.      Pulses: Normal pulses.      Heart sounds: Normal heart sounds. No murmur heard.  Pulmonary:      Effort: Pulmonary effort is normal. No respiratory distress.      Breath sounds: Normal breath sounds.    Abdominal:      General: Abdomen is flat.      Palpations: Abdomen is soft.      Tenderness: There is abdominal tenderness. There is no right CVA tenderness, left CVA tenderness, guarding or rebound.      Comments: Bilateral flank tenderness   Musculoskeletal:         General: No swelling. Normal range of motion.      Cervical back: Normal range of motion and neck supple.   Skin:     General: Skin is warm and dry.      Capillary Refill: Capillary refill takes less than 2 seconds.   Neurological:      General: No focal deficit present.      Mental Status: She is alert and oriented to person, place, and time.   Psychiatric:         Mood and Affect: Mood normal.         Behavior: Behavior normal.           ED Course & MDM   Diagnoses as of 05/07/25 0002   Viral gastroenteritis   Nausea vomiting and diarrhea                 No data recorded     Morgan Coma Scale Score: 15 (05/06/25 1453 : Paulette Parra RN)                           Medical Decision Making  Patient is a 60-year-old female with past medical history of CKD stage III presenting with concerns for diarrhea, vomiting, headache and dizziness.  Lab work, urine, imaging ordered.  Medication ordered.  Conditions considered include but are not limited to: Dehydration, JENNIFER, viral illness, intra-abdominal pathology.    Attending physician was available for consultation this patient.  Lipase within normal limit.  CMP without significant electrolyte abnormality but does show CKD with creatinine 1.26.  This is improved from prior lab testing.  CBC is without leukocytosis or anemia.  CT abdomen pelvis without acute findings.  Report printed and discussed at bedside with the patient.    I believe this patient is at low risk for complication, and a disposition of discharge is acceptable.  Return to the Emergency Department if new or worsening symptoms including headache, fever, chills, chest pain, shortness of breath, syncope, near syncope, abdominal pain, nausea,  vomiting,  diarrhea, or worsening pain.  Prescription for Zofran written.  Patient is agreeable to a disposition of discharge and to follow-up restricted feeds in the next several days.  Recommending encouraging hydration.    Portions of this note made with Dragon software, please be mindful of potential grammatical errors.      Medications   ondansetron (Zofran) injection 4 mg (4 mg intravenous Given 5/6/25 0408)   sodium chloride 0.9 % bolus 1,000 mL (0 mL intravenous Stopped 5/6/25 1617)     Labs Reviewed   CBC WITH AUTO DIFFERENTIAL - Abnormal       Result Value    WBC 6.7      nRBC 0.0      RBC 4.33      Hemoglobin 13.7      Hematocrit 40.9      MCV 95      MCH 31.6      MCHC 33.5      RDW 13.7      Platelets 143 (*)     Neutrophils % 50.4      Immature Granulocytes %, Automated 0.1      Lymphocytes % 37.4      Monocytes % 8.8      Eosinophils % 2.1      Basophils % 1.2      Neutrophils Absolute 3.39      Immature Granulocytes Absolute, Automated 0.01      Lymphocytes Absolute 2.52      Monocytes Absolute 0.59      Eosinophils Absolute 0.14      Basophils Absolute 0.08     COMPREHENSIVE METABOLIC PANEL - Abnormal    Glucose 99      Sodium 137      Potassium 3.3 (*)     Chloride 101      Bicarbonate 27      Anion Gap 12      Urea Nitrogen 14      Creatinine 1.26 (*)     eGFR 50 (*)     Calcium 8.9      Albumin 3.6      Alkaline Phosphatase 93      Total Protein 6.9      AST 17      Bilirubin, Total 1.0      ALT 12     LIPASE - Abnormal    Lipase 6 (*)     Narrative:     Venipuncture immediately after or during the administration of Metamizole may lead to falsely low results. Testing should be performed immediately prior to Metamizole dosing.   URINALYSIS WITH REFLEX CULTURE AND MICROSCOPIC - Abnormal    Color, Urine Yellow      Appearance, Urine Clear      Specific Gravity, Urine 1.014      pH, Urine 6.5      Protein, Urine NEGATIVE      Glucose, Urine Normal      Blood, Urine NEGATIVE      Ketones, Urine  NEGATIVE      Bilirubin, Urine NEGATIVE      Urobilinogen, Urine 2 (1+) (*)     Nitrite, Urine NEGATIVE      Leukocyte Esterase, Urine NEGATIVE     URINALYSIS WITH REFLEX CULTURE AND MICROSCOPIC    Narrative:     The following orders were created for panel order Urinalysis with Reflex Culture and Microscopic.  Procedure                               Abnormality         Status                     ---------                               -----------         ------                     Urinalysis with Reflex C...[[id number]]  Abnormal            Final result               Extra Urine Gray Tube[[id number]]                                                         Please view results for these tests on the individual orders.   EXTRA URINE GRAY TUBE   MORPHOLOGY    RBC Morphology See Below      Clumped Platelets Present       CT abdomen pelvis wo IV contrast   Final Result   No acute process in the abdomen or pelvis.        Redemonstrated ventral hernias with slight protrusion of the anterior   wall of the transverse colon to the defects not resulting obstruction   or strangulation.        Emphysema the lung bases.        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        MACRO:   None.        Signed by: Robert Ureña 2025 4:06 PM   Dictation workstation:   [id number]            Procedure  Procedures         [1]   Past Medical History:  Diagnosis Date    Anxiety and depression     Bipolar 1 disorder (Multi)     Chronic kidney disease     COPD (chronic obstructive pulmonary disease) (Multi)     HTN (hypertension)     LINDA (obstructive sleep apnea)    [2]   Past Surgical History:  Procedure Laterality Date     SECTION, CLASSIC      FOOT Right     GALLBLADDER SURGERY      HYSTERECTOMY      KNEE Right    [3] No family history on file.  [4]   Social History  Tobacco Use    Smoking status: Every Day     Current packs/day: 1.00     Average packs/day: 1 pack/day for 46.0 years (46.0  "ttl pk-yrs)     Types: Cigarettes    Smokeless tobacco: Never   Vaping Use    Vaping status: Never Used   Substance Use Topics    Alcohol use: Not Currently     Comment: \"recovering addict\"    Drug use: Defer        Kashif Flanagan PA-C  05/07/25 0002    "

## 2025-05-06 NOTE — Clinical Note
Celine Jhaveri was seen and treated in our emergency department on 5/6/2025.  She may return to work on 05/08/2025.       If you have any questions or concerns, please don't hesitate to call.      Kashif Flanagan PA-C

## 2025-05-08 ENCOUNTER — OFFICE VISIT (OUTPATIENT)
Dept: PRIMARY CARE | Facility: CLINIC | Age: 60
End: 2025-05-08
Payer: COMMERCIAL

## 2025-05-08 VITALS
TEMPERATURE: 97.3 F | DIASTOLIC BLOOD PRESSURE: 90 MMHG | RESPIRATION RATE: 18 BRPM | OXYGEN SATURATION: 98 % | HEART RATE: 98 BPM | BODY MASS INDEX: 38.41 KG/M2 | SYSTOLIC BLOOD PRESSURE: 140 MMHG | WEIGHT: 210 LBS

## 2025-05-08 DIAGNOSIS — R10.9 ACUTE LEFT FLANK PAIN: ICD-10-CM

## 2025-05-08 DIAGNOSIS — N18.31 STAGE 3A CHRONIC KIDNEY DISEASE (MULTI): ICD-10-CM

## 2025-05-08 DIAGNOSIS — G43.901 MIGRAINE WITH STATUS MIGRAINOSUS, NOT INTRACTABLE, UNSPECIFIED MIGRAINE TYPE: Primary | ICD-10-CM

## 2025-05-08 DIAGNOSIS — E78.5 HYPERLIPIDEMIA, UNSPECIFIED HYPERLIPIDEMIA TYPE: ICD-10-CM

## 2025-05-08 DIAGNOSIS — K29.70 VIRAL GASTRITIS: ICD-10-CM

## 2025-05-08 PROCEDURE — 3077F SYST BP >= 140 MM HG: CPT | Performed by: REGISTERED NURSE

## 2025-05-08 PROCEDURE — 99214 OFFICE O/P EST MOD 30 MIN: CPT | Performed by: REGISTERED NURSE

## 2025-05-08 PROCEDURE — G2211 COMPLEX E/M VISIT ADD ON: HCPCS | Performed by: REGISTERED NURSE

## 2025-05-08 PROCEDURE — 3080F DIAST BP >= 90 MM HG: CPT | Performed by: REGISTERED NURSE

## 2025-05-08 RX ORDER — ATORVASTATIN CALCIUM 20 MG/1
20 TABLET, FILM COATED ORAL DAILY
Qty: 90 TABLET | Refills: 3 | Status: SHIPPED | OUTPATIENT
Start: 2025-05-08 | End: 2026-05-08

## 2025-05-08 RX ORDER — SUMATRIPTAN SUCCINATE 100 MG/1
100 TABLET ORAL ONCE AS NEEDED
Qty: 9 TABLET | Refills: 1 | Status: CANCELLED | OUTPATIENT
Start: 2025-05-08

## 2025-05-08 ASSESSMENT — PATIENT HEALTH QUESTIONNAIRE - PHQ9
1. LITTLE INTEREST OR PLEASURE IN DOING THINGS: NOT AT ALL
SUM OF ALL RESPONSES TO PHQ9 QUESTIONS 1 AND 2: 0
2. FEELING DOWN, DEPRESSED OR HOPELESS: NOT AT ALL

## 2025-05-08 ASSESSMENT — PAIN SCALES - GENERAL: PAINLEVEL_OUTOF10: 5

## 2025-05-08 ASSESSMENT — ENCOUNTER SYMPTOMS: HEADACHES: 1

## 2025-05-08 NOTE — PROGRESS NOTES
Subjective   Patient ID: Celine Jhaveri is a 60 y.o. female who presents for Follow-up (Pt here for follow up from [facility] 5[facility]6/25 for vomiting, diarrhea. Pt states she still has headache and would like refill on imitrex).    HPI   Pt is a follow up for viral gastritis for which she was seen in the ER.  She reports symptoms have improved but still has ha and would like a refill.  Review of Systems   Neurological:  Positive for headaches.   All other systems reviewed and are negative.      Objective   /90   Pulse 98   Temp 36.3 °C (97.3 °F)   Resp 18   Wt 95.3 kg (210 lb)   SpO2 98%   BMI 38.41 kg/m²     Physical Exam  Vitals reviewed.   Constitutional:       Appearance: Normal appearance.   Cardiovascular:      Rate and Rhythm: Normal rate.      Pulses: Normal pulses.   Pulmonary:      Effort: Pulmonary effort is normal.   Abdominal:      General: Bowel sounds are normal.      Palpations: Abdomen is soft.   Neurological:      Mental Status: She is alert.   Psychiatric:         Mood and Affect: Mood normal.         Behavior: Behavior normal.         Assessment/Plan   Problem List Items Addressed This Visit           ICD-10-CM    Hyperlipidemia E78.5    Relevant Medications    atorvastatin (Lipitor) 20 mg tablet    Migraine - Primary G43.909    Relevant Medications    rimegepant (NURTEC) 75 mg tablet,disintegrating    Renal failure N19    Relevant Medications    rimegepant (NURTEC) 75 mg tablet,disintegrating     Other Visit Diagnoses         Codes      Viral gastritis   (Inactive)   K29.70    resolved       Acute left flank pain   (Active)   R10.9    CT done in ER with no significant abnormal findings

## 2025-05-13 NOTE — H&P
History Of Present Illness  Celine Jhaveri is a 60 y.o. female presenting with acute on chronic kidney disease stage 3.  no clear etiology. No improvement in renal function despite stopping Farxiga. Recent (5/6/2025) Creat 1.26. Follows with nephrology Dr. Valdes.  PMH includes acute on CKD stage 3, renal cyst, HTN, HLD, CHF, bipolar, depression, anxiety, fibromyalgia,        Past Medical History:  Medical History[1]     Past Surgical History:  Surgical History[2]       Social History:  Social History[3]    Family History:  Family History[4]     Allergies:  RX Allergies[5]     Home Medications:  Current Outpatient Medications   Medication Instructions    albuterol 90 mcg/actuation inhaler 2 puffs, inhalation, Every 6 hours PRN    atorvastatin (LIPITOR) 20 mg, oral, Daily    busPIRone (BUSPAR) 30 mg, 2 times daily    cyclobenzaprine (FLEXERIL) 10 mg, oral, 2 times daily PRN    Farxiga 10 mg, oral, Daily    FLUoxetine (PROZAC) 10 mg, Daily    fluticasone propion-salmeteroL (Wixela Inhub) 500-50 mcg/dose diskus inhaler 1 puff, inhalation, 2 times daily RT, Rinse mouth with water after use to reduce aftertaste and incidence of candidiasis. Do not swallow.    Geodon 60 mg, Every 12 hours    hydrOXYzine pamoate (VISTARIL) 25 mg, 3 times daily PRN    ipratropium-albuteroL (Duo-Neb) 0.5-2.5 mg/3 mL nebulizer solution 3 mL, nebulization, 4 times daily RT    lisinopril 10 mg, oral, Daily    montelukast (SINGULAIR) 10 mg, oral, Daily    potassium chloride CR 20 mEq ER tablet 20 mEq, oral, Daily    rimegepant (NURTEC) 75 mg, oral, Every other day    SUMAtriptan (IMITREX) 100 mg, Once as needed    torsemide (DEMADEX) 100 mg, oral, Daily       Inpatient Medications:  Scheduled Medications[6]  PRN Medications[7]  Continuous Medications[8]      Review of Systems   Constitutional:  Positive for fatigue.   HENT: Negative.     Eyes: Negative.    Respiratory: Negative.     Cardiovascular: Negative.    Gastrointestinal: Negative.     Endocrine: Negative.    Genitourinary: Negative.    Musculoskeletal: Negative.    Skin: Negative.    Allergic/Immunologic: Negative.    Neurological: Negative.    Hematological: Negative.    Psychiatric/Behavioral: Negative.            Physical Exam  Constitutional:       General: She is awake. She is not in acute distress.     Appearance: She is not ill-appearing.   HENT:      Head: Normocephalic and atraumatic.      Mouth/Throat:      Mouth: Mucous membranes are moist.      Pharynx: Oropharynx is clear.   Cardiovascular:      Rate and Rhythm: Normal rate and regular rhythm.      Pulses:           Radial pulses are 2+ on the right side and 2+ on the left side.        Dorsalis pedis pulses are 2+ on the right side and 2+ on the left side.      Heart sounds: Normal heart sounds. No murmur heard.  Pulmonary:      Effort: Pulmonary effort is normal.      Breath sounds: Normal breath sounds.   Abdominal:      General: Bowel sounds are normal.      Palpations: Abdomen is soft.   Musculoskeletal:         General: Normal range of motion.      Cervical back: Normal range of motion and neck supple.      Right lower leg: No edema.      Left lower leg: No edema.   Skin:     General: Skin is warm and dry.      Capillary Refill: Capillary refill takes less than 2 seconds.   Neurological:      General: No focal deficit present.      Mental Status: She is alert and oriented to person, place, and time. Mental status is at baseline.      Cranial Nerves: Cranial nerves 2-12 are intact.   Psychiatric:         Mood and Affect: Mood and affect normal.         Behavior: Behavior normal.        Sedation Plan    ASA 3     Mallampati class: II.           NPO since 2100 on 5/20/2025    Last Recorded Vitals  Blood pressure 127/73, pulse 90, temperature 36.6 °C (97.8 °F), temperature source Temporal, resp. rate 20, SpO2 97%.         Vitals from the Past 24 Hours  Heart Rate:  []   Temp:  [36.6 °C (97.8 °F)]   Resp:  [16-20]   BP:  "(120-170)/()   SpO2:  [94 %-100 %]          Relevant Results    Labs    POCT Glucose:      POCT Urine Pregnancy:       CBC:   Recent Labs     05/06/25  1540 04/05/25  0843 04/02/25  1244 10/25/24  0840 09/13/24  1151 08/21/24  1505   WBC 6.7 5.2 8.6 5.8 6.2 6.8   HGB 13.7 13.6 14.9 13.5 13.3 14.3   HCT 40.9 41.2 43.9 39.1 39.6 41.6   * 165 162 193 162 148*   MCV 95 97 94 89 91 89     BMP/CMP:   Recent Labs     05/06/25  1501 04/05/25  0843 04/02/25  1244 03/11/25  1246 11/25/24  1419 11/07/24  1511 10/25/24  0840 09/13/24  1151 08/21/24  1505 02/07/24  1305 11/15/23  1331    140 138 139 137 131* 131*   < > 131*   < > 140   K 3.3* 3.1* 3.1* 3.1* 4.2 3.8 4.0   < > 3.7   < > 3.6    98 100 98 99 96* 98   < > 95*   < > 97   BUN 14 22 20 30* 25* 14 10   < > 15   < > 34*   CREATININE 1.26* 1.59* 1.54* 2.09* 1.34* 1.03 1.03   < > 1.10   < > 1.80*   CO2 27 32 29 27 31 27 27   < > 28   < > 27   CALCIUM 8.9 8.9 9.6 8.7 8.8 8.7 8.7   < > 9.2   < > 9.8   PROT 6.9  --  7.6 6.9  --   --  6.7  --  7.4  --  7.5   BILITOT 1.0  --  0.9 0.9  --   --  0.5  --  0.6  --  0.5   ALKPHOS 93  --  102 93  --   --  106  --  120  --  182*   ALT 12  --  16 14  --   --  16  --  15  --  24   AST 17  --  21 17  --   --  19  --  19  --  18   GLUCOSE 99 73* 132* 133* 178* 93 82   < > 103*   < > 142*    < > = values in this interval not displayed.      Magnesium:   Recent Labs     05/10/24  1107 03/01/24  1703   MG 1.90 1.90     Lipid Panel:   Recent Labs     10/25/24  0840 06/19/23  0925 06/20/22  0850 05/21/21  1040 08/28/20  0855 01/06/20  1030 06/21/19  0812   CHOL 149 148 137 125* 137 152 153   HDL 69.1 62 54 48* 57 46* 59   CHHDL 2.2 2.4 2.5 2.6 2.4 3.3 2.6   VLDL 12  --   --   --   --   --   --    TRIG 59 75 76 75 79 112 64   NHDL 80  --   --   --   --   --   --      Cardiac       No lab exists for component: \"CK\", \"CKMBP\"   Hemoglobin A1C:   Recent Labs     03/16/18  1240   HGBA1C 5.7     TSH/ Free T4:   Recent Labs     " "10/25/24  0840 06/19/23  0925 06/20/22  0850 05/21/21  1040 08/28/20  0855 06/21/19  0812   TSH 1.20 0.62 1.06 0.87 2.53 2.07     Iron: No results for input(s): \"FERRITIN\", \"TIBC\", \"IRONSAT\", \"BNP\" in the last 96719 hours.  Coag:   Results from last 7 days   Lab Units 05/21/25  1115   POCT INTERNATIONAL NORMALIZATION RATIO  1.0     ABO: No results found for: \"ABO\"    Past Cardiology Tests (Last 3 Years):    EKG:  Recent Labs     11/10/23  1543   ATRRATE 95   VENTRATE 95   PRINT 174   QRSDUR 80   QTCFRED 445   QTCCALCB 480   No results found. However, due to the size of the patient record, not all encounters were searched. Please check Results Review for a complete set of results.  Echo:  Echocardiogram: No results found for this or any previous visit from the past 1800 days.    Ejection Fractions:  No results found for: \"EF\"  Cath:  Coronary Angiography: No results found for this or any previous visit from the past 1800 days.    Right Heart Cath: No results found for this or any previous visit from the past 1800 days.    Stress Test:  Nuclear:No results found for this or any previous visit from the past 1800 days.    Metabolic Stress: No results found for this or any previous visit from the past 1800 days.    Cardiac Imaging:  Cardiac Scoring: No results found for this or any previous visit from the past 1800 days.    Cardiac MRI: No results found for this or any previous visit from the past 1800 days.         Assessment/Plan  Assessment/Plan   Assessment & Plan  Acute kidney failure, unspecified      Acute on chronic kidney disease stage 3  Renal cyst  HTN    Here for CT guided left renal biopsy with Dr. Chavis on 5/21/2025.           I spent 35 minutes in the professional and overall care of this patient.      Trudy Aiken, APRN-CNP         [1]   Past Medical History:  Diagnosis Date    Anxiety and depression     Bipolar 1 disorder (Multi)     Chronic kidney disease     COPD (chronic obstructive pulmonary " "disease) (Multi)     HTN (hypertension)     LINDA (obstructive sleep apnea)    [2]   Past Surgical History:  Procedure Laterality Date     SECTION, CLASSIC      FOOT Right     GALLBLADDER SURGERY      HYSTERECTOMY      KNEE Right    [3]   Social History  Tobacco Use    Smoking status: Every Day     Current packs/day: 1.00     Average packs/day: 1 pack/day for 46.0 years (46.0 ttl pk-yrs)     Types: Cigarettes    Smokeless tobacco: Never   Vaping Use    Vaping status: Never Used   Substance Use Topics    Alcohol use: Not Currently     Comment: \"recovering addict\"    Drug use: Defer   [4] No family history on file.  [5]   Allergies  Allergen Reactions    Morphine Other     Kidney and liver failure    Amoxicillin-Pot Clavulanate Hives    Acetaminophen Other and Unknown     Kidney failure    Other reaction(s): kidney problems    Buprenorphine Unknown    Cephalexin GI Upset and Other     Other reaction(s): stomach upset    Citalopram Unknown    Clarithromycin Unknown    Ibuprofen Other and Unknown     Kidney problems    Other reaction(s): Kidney problems    Milnacipran Swelling     Hand swelling    Other reaction(s): hand swelling    Oxycodone-Acetaminophen Swelling and Unknown     Other reaction(s): LEG SWELLING    Phenobarbital Unknown     Other reaction(s): Unknown    Pregabalin Unknown     Other reaction(s): unknown    Bupropion Hallucinations, Anxiety, Unknown and Rash     Nerve problems    Other reaction(s): nerves    Duloxetine Anxiety and Unknown     nerves    Other reaction(s): nerves    Latex Itching and Rash     Other reaction(s): Unknown    Meperidine Rash   [6] [7] [8]   "

## 2025-05-21 ENCOUNTER — HOSPITAL ENCOUNTER (OUTPATIENT)
Dept: RADIOLOGY | Facility: HOSPITAL | Age: 60
Discharge: HOME | End: 2025-05-21
Payer: COMMERCIAL

## 2025-05-21 VITALS
SYSTOLIC BLOOD PRESSURE: 127 MMHG | HEART RATE: 90 BPM | RESPIRATION RATE: 20 BRPM | DIASTOLIC BLOOD PRESSURE: 73 MMHG | OXYGEN SATURATION: 97 % | TEMPERATURE: 97.8 F

## 2025-05-21 DIAGNOSIS — N17.9 ACUTE KIDNEY FAILURE, UNSPECIFIED: ICD-10-CM

## 2025-05-21 LAB
POCT INTERNATIONAL NORMALIZATION RATIO: 1
POCT PROTHROMBIN TIME: 12.5 SECONDS

## 2025-05-21 PROCEDURE — 7100000009 HC PHASE TWO TIME - INITIAL BASE CHARGE

## 2025-05-21 PROCEDURE — 50200 RENAL BIOPSY PERQ: CPT | Mod: LT

## 2025-05-21 PROCEDURE — 99152 MOD SED SAME PHYS/QHP 5/>YRS: CPT

## 2025-05-21 PROCEDURE — 2500000004 HC RX 250 GENERAL PHARMACY W/ HCPCS (ALT 636 FOR OP/ED): Mod: JZ | Performed by: RADIOLOGY

## 2025-05-21 PROCEDURE — 2720000007 HC OR 272 NO HCPCS

## 2025-05-21 PROCEDURE — 7100000010 HC PHASE TWO TIME - EACH INCREMENTAL 1 MINUTE

## 2025-05-21 RX ORDER — LIDOCAINE HYDROCHLORIDE 10 MG/ML
INJECTION, SOLUTION EPIDURAL; INFILTRATION; INTRACAUDAL; PERINEURAL
Status: COMPLETED | OUTPATIENT
Start: 2025-05-21 | End: 2025-05-21

## 2025-05-21 RX ORDER — MIDAZOLAM HYDROCHLORIDE 1 MG/ML
INJECTION, SOLUTION INTRAMUSCULAR; INTRAVENOUS
Status: COMPLETED | OUTPATIENT
Start: 2025-05-21 | End: 2025-05-21

## 2025-05-21 RX ORDER — HYDRALAZINE HYDROCHLORIDE 20 MG/ML
INJECTION INTRAMUSCULAR; INTRAVENOUS
Status: COMPLETED | OUTPATIENT
Start: 2025-05-21 | End: 2025-05-21

## 2025-05-21 RX ORDER — FENTANYL CITRATE 50 UG/ML
INJECTION, SOLUTION INTRAMUSCULAR; INTRAVENOUS
Status: COMPLETED | OUTPATIENT
Start: 2025-05-21 | End: 2025-05-21

## 2025-05-21 RX ORDER — LABETALOL HYDROCHLORIDE 5 MG/ML
INJECTION, SOLUTION INTRAVENOUS
Status: COMPLETED | OUTPATIENT
Start: 2025-05-21 | End: 2025-05-21

## 2025-05-21 RX ADMIN — LABETALOL HYDROCHLORIDE 20 MG: 5 INJECTION, SOLUTION INTRAVENOUS at 12:40

## 2025-05-21 RX ADMIN — LABETALOL HYDROCHLORIDE 20 MG: 5 INJECTION, SOLUTION INTRAVENOUS at 12:20

## 2025-05-21 RX ADMIN — MIDAZOLAM 1 MG: 1 INJECTION INTRAMUSCULAR; INTRAVENOUS at 12:24

## 2025-05-21 RX ADMIN — LIDOCAINE HYDROCHLORIDE 10 ML: 10 INJECTION, SOLUTION EPIDURAL; INFILTRATION; INTRACAUDAL; PERINEURAL at 12:25

## 2025-05-21 RX ADMIN — FENTANYL CITRATE 50 MCG: 0.05 INJECTION, SOLUTION INTRAMUSCULAR; INTRAVENOUS at 12:24

## 2025-05-21 RX ADMIN — HYDRALAZINE HYDROCHLORIDE 10 MG: 20 INJECTION, SOLUTION INTRAMUSCULAR; INTRAVENOUS at 12:02

## 2025-05-21 ASSESSMENT — PAIN SCALES - GENERAL

## 2025-05-21 ASSESSMENT — ENCOUNTER SYMPTOMS
ALLERGIC/IMMUNOLOGIC NEGATIVE: 1
MUSCULOSKELETAL NEGATIVE: 1
ENDOCRINE NEGATIVE: 1
HEMATOLOGIC/LYMPHATIC NEGATIVE: 1
GASTROINTESTINAL NEGATIVE: 1
PSYCHIATRIC NEGATIVE: 1
RESPIRATORY NEGATIVE: 1
EYES NEGATIVE: 1
FATIGUE: 1
NEUROLOGICAL NEGATIVE: 1
CARDIOVASCULAR NEGATIVE: 1

## 2025-05-21 ASSESSMENT — COLUMBIA-SUICIDE SEVERITY RATING SCALE - C-SSRS
2. HAVE YOU ACTUALLY HAD ANY THOUGHTS OF KILLING YOURSELF?: NO
6. HAVE YOU EVER DONE ANYTHING, STARTED TO DO ANYTHING, OR PREPARED TO DO ANYTHING TO END YOUR LIFE?: NO
1. IN THE PAST MONTH, HAVE YOU WISHED YOU WERE DEAD OR WISHED YOU COULD GO TO SLEEP AND NOT WAKE UP?: NO

## 2025-05-21 NOTE — Clinical Note
Pt proned secured on ct table.and placed on 3liters o2 nc for conscious sedation. Placed on EKG monitor.

## 2025-05-21 NOTE — Clinical Note
Dr made aware that the bp is 139/77 and he says he wants another 20mg labetolol to be given now post procedure.  Given as ordered.  Pt denies any discomforts. Infectious Diseases Progress Note      LOS: 13 days   Patient Care Team:  Provider, No Known as PCP - Ranulfo Marinelli MD as Consulting Physician (Nephrology)    Chief Complaint: Intubated on the ventilator    Subjective     The patient had no high-grade fever during the last 24 hours.  The patient remained intubated on the ventilator 100% FiO2.  The patient is currently off vasopressors    Review of Systems:   Review of Systems   Unable to perform ROS: Intubated        Objective     Vital Signs  Temp:  [99.2 °F (37.3 °C)-99.9 °F (37.7 °C)] 99.2 °F (37.3 °C)  Heart Rate:  [62-67] 62  Resp:  [15-30] 30  BP: (128-161)/(60-72) 128/63  Arterial Line BP: (130-164)/(43-66) 130/43  FiO2 (%):  [100 %] 100 %    Physical Exam:  Physical Exam  Constitutional:       Comments: Intubated and sedated on the ventilator   HENT:      Head: Normocephalic and atraumatic.      Mouth/Throat:      Mouth: Mucous membranes are moist.   Eyes:      Pupils: Pupils are equal, round, and reactive to light.   Cardiovascular:      Rate and Rhythm: Normal rate and regular rhythm.   Pulmonary:      Breath sounds: Rales present.   Abdominal:      General: There is distension.      Comments: Less distention abdomen today examination   Musculoskeletal:      Cervical back: Neck supple.      Right lower leg: Edema present.      Left lower leg: Edema present.   Neurological:      Comments: Intubated and sedated          Results Review:    I have reviewed all clinical data, test, lab, and imaging results.     Radiology  No Radiology Exams Resulted Within Past 24 Hours    Cardiology    Laboratory    Results from last 7 days   Lab Units 09/03/21  0303 09/02/21  0301 09/01/21  0523 08/31/21  0538 08/30/21  0325 08/29/21  0518 08/28/21  0244   WBC 10*3/mm3 28.70* 22.20* 22.50* 21.10* 21.50* 20.00* 16.00*   HEMOGLOBIN g/dL 11.1* 10.5* 11.6* 11.7* 11.4* 10.9* 11.7*   HEMATOCRIT % 32.2* 32.8* 34.9* 36.3* 35.1* 33.4* 35.1*   PLATELETS 10*3/mm3 154 708  170 169 147 145 143     Results from last 7 days   Lab Units 09/03/21  0831 09/03/21  0303 09/02/21  0301 09/01/21  0523 08/31/21  0537 08/30/21  1157 08/30/21  0325   SODIUM mmol/L 162* 161* 151* 150* 147* 149* 148*   POTASSIUM mmol/L 4.2 3.8 4.4 4.7 4.5 4.4 4.6   CHLORIDE mmol/L 130* 130* 118* 118* 115* 116* 116*   CO2 mmol/L 22.0 18.0* 21.0* 21.0* 21.0* 22.0 21.0*   BUN mg/dL 88* 92* 101* 89* 81* 83* 85*   CREATININE mg/dL 1.84* 1.80* 1.89* 1.68* 1.84* 1.96* 2.06*   GLUCOSE mg/dL 85 164* 450* 340* 177* 149* 150*   ALBUMIN g/dL 3.00* 3.00* 3.20* 2.50* 2.50* 2.50* 2.50*   BILIRUBIN mg/dL 2.0* 2.3* 1.7* 1.9* 2.6* 2.1* 1.9*   ALK PHOS U/L 110 98 93 99 108 104 106   AST (SGOT) U/L 56* 31 18 18 20 19 14   ALT (SGPT) U/L 74* 47* 35 31 29 27 27   CALCIUM mg/dL 9.1 9.2 9.4 8.8 8.7 8.4* 8.5*     Results from last 7 days   Lab Units 09/03/21  0303   CK TOTAL U/L 29             Microbiology   Microbiology Results (last 10 days)     Procedure Component Value - Date/Time    AFB Culture - Wash, Bronchus [[id number]] Collected: 08/31/21 0909    Lab Status: Preliminary result Specimen: Wash from Bronchus Updated: 09/01/21 1221     AFB Stain No acid fast bacilli seen    Respiratory Culture - Wash, Bronchus [[id number]] Collected: 08/31/21 0909    Lab Status: Final result Specimen: Wash from Bronchus Updated: 09/02/21 1132     Respiratory Culture No growth     Gram Stain Few (2+) WBCs per low power field      Rare (1+) Mixed bacterial morphotypes seen on Gram Stain    Respiratory Panel, PCR (WITHOUT COVID) - Wash, Bronchus [[id number]]  (Normal) Collected: 08/31/21 0909    Lab Status: Final result Specimen: Wash from Bronchus Updated: 08/31/21 1056     ADENOVIRUS, PCR Not Detected     Coronavirus 229E Not Detected     Coronavirus HKU1 Not Detected     Coronavirus NL63 Not Detected     Coronavirus OC43 Not Detected     Human Metapneumovirus Not Detected     Human Rhinovirus/Enterovirus Not Detected     Influenza B PCR Not Detected      "Parainfluenza Virus 1 Not Detected     Parainfluenza Virus 2 Not Detected     Parainfluenza Virus 3 Not Detected     Parainfluenza Virus 4 Not Detected     Bordetella pertussis pcr Not Detected     Chlamydophila pneumoniae PCR Not Detected     Mycoplasma pneumo by PCR Not Detected     Influenza A PCR Not Detected     RSV, PCR Not Detected     Bordetella parapertussis PCR Not Detected    Narrative:      The coronavirus on the RVP is NOT COVID-19 and is NOT indicative of infection with COVID-19.    In the setting of a positive respiratory panel with a viral infection PLUS a negative procalcitonin without other underlying concern for bacterial infection, consider observing off antibiotics or discontinuation of antibiotics and continue supportive care. If the respiratory panel is positive for atypical bacterial infection (Bordetella pertussis, Chlamydophila pneumoniae, or Mycoplasma pneumoniae), consider antibiotic de-escalation to target atypical bacterial infection.    Histoplasma Antigen, CSF or BAL - Wash, Bronchus [[id number]] Collected: 08/31/21 0909    Lab Status: Final result Specimen: Wash from Bronchus Updated: 09/03/21 0713     Result None Detected ng/mL      Comment:                                           None Detected        Interpretation Negative     Comment: Positive results reported in ng/mL from 0.20 ng/mL to 20.00 ng/mL  Positive results above 20.00 ng/mL are reported as \"Above the  Limit of Quantification\"       Narrative:      Performed at:  [address] [place] [address] [place]  [address] [place] [address] [place]  [id number]  : Al Heredia MD, Phone:  [phone]    COVID-19,[facility] ROUTINE, NP/OP SWAB IN TRANSPORT MEDIA OR ESWAB 72 HR TAT - Wash, Bronchus [[id number]] Collected: 08/31/21 0909    Lab Status: Final result Specimen: Wash from Bronchus Updated: 09/02/21 0338     SARS-CoV-2, DISHA Not Detected     Comment: This nucleic acid amplification test was developed and its " performance  characteristics determined by [facility]. Nucleic acid  amplification tests include RT-PCR and TMA. This test has not been  FDA cleared or approved. This test has been authorized by FDA under  an Emergency Use Authorization (EUA). This test is only authorized  for the duration of time the declaration that circumstances exist  justifying the authorization of the emergency use of in vitro  diagnostic tests for detection of SARS-CoV-2 virus and/or diagnosis  of COVID-19 infection under section 564(b)(1) of the Act, 21 U.S.C.  360bbb-3(b) (1), unless the authorization is terminated or revoked  sooner.  When diagnostic testing is negative, the possibility of a false  negative result should be considered in the context of a patient's  recent exposures and the presence of clinical signs and symptoms  consistent with COVID-19. An individual without symptoms of COVID-19  and who is not shedding SARS-CoV-2 virus would expect to have a  negative (not detected) result in this assay.       Narrative:      Performed at:  [address] [place] [address]  [address] [place], [place]  [id number]  : Susannah Tesfaye [facility], Phone:  [phone]    Blood Culture - Blood, Hand, Right [[id number]] Collected: 08/30/21 1415    Lab Status: Preliminary result Specimen: Blood from Hand, Right Updated: 09/02/21 1430     Blood Culture No growth at 3 days    Blood Culture - Blood, Blood, Central Line [[id number]] Collected: 08/30/21 1333    Lab Status: Preliminary result Specimen: Blood, Central Line Updated: 09/02/21 1345     Blood Culture No growth at 3 days    S. Pneumo Ag Urine or CSF - Urine, Urine, Catheter [[id number]]  (Normal) Collected: 08/29/21 1246    Lab Status: Final result Specimen: Urine, Catheter Updated: 08/29/21 1315     Strep Pneumo Ag Negative    Legionella Antigen, Urine - Urine, Urine, Catheter [[id number]]  (Normal) Collected: 08/29/21 1246    Lab Status: Final result Specimen: Urine, Catheter Updated:  08/29/21 1315     LEGIONELLA ANTIGEN, URINE Negative    Respiratory Panel PCR w/COVID-19(SARS-CoV-2) MARY KAY/KERRIE/MALENA/PAD/COR/MAD/BUZZ In-House, NP Swab in UTM/VTM, 3-4 HR TAT - Swab, Nasopharynx [[id number]]  (Normal) Collected: 08/29/21 1246    Lab Status: Final result Specimen: Swab from Nasopharynx Updated: 08/29/21 1355     ADENOVIRUS, PCR Not Detected     Coronavirus 229E Not Detected     Coronavirus HKU1 Not Detected     Coronavirus NL63 Not Detected     Coronavirus OC43 Not Detected     COVID19 Not Detected     Human Metapneumovirus Not Detected     Human Rhinovirus/Enterovirus Not Detected     Influenza A PCR Not Detected     Influenza B PCR Not Detected     Parainfluenza Virus 1 Not Detected     Parainfluenza Virus 2 Not Detected     Parainfluenza Virus 3 Not Detected     Parainfluenza Virus 4 Not Detected     RSV, PCR Not Detected     Bordetella pertussis pcr Not Detected     Bordetella parapertussis PCR Not Detected     Chlamydophila pneumoniae PCR Not Detected     Mycoplasma pneumo by PCR Not Detected    Narrative:      In the setting of a positive respiratory panel with a viral infection PLUS a negative procalcitonin without other underlying concern for bacterial infection, consider observing off antibiotics or discontinuation of antibiotics and continue supportive care. If the respiratory panel is positive for atypical bacterial infection (Bordetella pertussis, Chlamydophila pneumoniae, or Mycoplasma pneumoniae), consider antibiotic de-escalation to target atypical bacterial infection.    BAL Culture, Quantitative - Lavage, Bronchus [[id number]]  (Abnormal)  (Susceptibility) Collected: 08/27/21 1233    Lab Status: Final result Specimen: Lavage from Bronchus Updated: 08/29/21 1006     BAL Culture 25,000 CFU/mL Staphylococcus aureus      No Normal Respiratory Muna     Gram Stain Few (2+) WBCs per low power field      Rare (1+) Gram positive cocci    Susceptibility      Staphylococcus aureus      SATISH       Clindamycin Susceptible      Oxacillin Susceptible      Penicillin G Resistant      Tetracycline Susceptible      Trimethoprim + Sulfamethoxazole Susceptible      Vancomycin Susceptible               Linear View                   Respiratory Culture - Sputum, ET Suction [[id number]]  (Abnormal)  (Susceptibility) Collected: 08/25/21 0215    Lab Status: Final result Specimen: Sputum from ET Suction Updated: 08/27/21 0919     Respiratory Culture Light growth (2+) Staphylococcus aureus      Rare Normal Respiratory Umna     Gram Stain Few (2+) Epithelial cells per low power field      Moderate (3+) WBCs per low power field      Few (2+) Mixed bacterial morphotypes seen on Gram Stain    Susceptibility      Staphylococcus aureus      SATISH      Clindamycin Susceptible      Oxacillin Susceptible      Penicillin G Resistant      Tetracycline Susceptible      Trimethoprim + Sulfamethoxazole Susceptible      Vancomycin Susceptible               Linear View                   Blood Culture - Blood, Blood, Central Line [[id number]] Collected: 08/25/21 0054    Lab Status: Final result Specimen: Blood, Central Line Updated: 08/30/21 0115     Blood Culture No growth at 5 days    Blood Culture - Blood, Blood, Arterial Line [[id number]] Collected: 08/25/21 0054    Lab Status: Final result Specimen: Blood, Arterial Line Updated: 08/30/21 0115     Blood Culture No growth at 5 days          Medication Review:       Schedule Meds  amLODIPine, 10 mg, Oral, Q24H  arformoterol, 15 mcg, Nebulization, BID - RT  budesonide, 0.5 mg, Nebulization, BID - RT  cloNIDine, 1 patch, Transdermal, Weekly  famotidine, 20 mg, Nasogastric, Daily  folic acid, 1 mg, Oral, Daily  hydrALAZINE, 100 mg, Nasogastric, Q8H  ipratropium-albuterol, 3 mL, Nebulization, Q4H - RT  iron sucrose, 100 mg, Intravenous, Once  methylPREDNISolone sodium succinate, 40 mg, Intravenous, Q8H  metoclopramide, 5 mg, Intravenous, Q8H  multivitamin, 1 tablet, Oral,  Daily  piperacillin-tazobactam, 4.5 g, Intravenous, Q12H  polyethylene glycol, 17 g, Nasogastric, Daily  sildenafil, 20 mg, Oral, Q8H  sodium chloride, 3 mL, Intravenous, Q12H  thiamine, 100 mg, Nasogastric, Daily        Infusion Meds  Adult Central Clinimix TPN, , Last Rate: 63 mL/hr at 09/02/21 1716  clevidipine, 2-32 mg/hr, Last Rate: 2 mg/hr (09/03/21 1057)  epoprostenol, 50 ng/kg/min (Ideal), Last Rate: 50 ng/kg/min (09/01/21 0923)  fentanyl 10 mcg/mL,  mcg/hr, Last Rate: 300 mcg/hr (09/02/21 2302)  insulin, 1-20 Units/hr, Last Rate: 2 Units/hr (09/03/21 1025)  labetalol, 0.5-2 mg/min, Last Rate: 1 mg/min (09/03/21 1129)  midazolam 1 mg/mL 100mL NS, 1-10 mg/hr, Last Rate: Stopped (09/02/21 1400)  Pharmacy to Dose TPN,   Pharmacy to Dose Zosyn,         PRN Meds  •  acetaminophen  •  albumin human  •  alteplase 2 mg vial + sterile water for injection  •  bisacodyl  •  carboxymethylcellulose sod  •  clevidipine  •  dextrose  •  dextrose  •  dextrose  •  glucagon (human recombinant)  •  hydrALAZINE  •  LORazepam  •  ondansetron **OR** ondansetron  •  Pharmacy to Dose TPN  •  Pharmacy to Dose Zosyn  •  sodium chloride  •  vecuronium        Assessment/Plan       Antimicrobial Therapy   1.  IV Zosyn      day  2.      Day  3.      Day  4.      Day  5.      Day    Assessment     Severe respiratory failure on the ventilator 100% FiO2.  Patient was screened negative twice for Covid 19.  The patient is probably in ARDS.  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  The patient developed alcohol withdrawal 1 week ago     Distended abdomen.  The etiology is not clear.  Bedside ultrasound showed minimal fluid.  Lactate was normal     History of alcohol abuse.  Patient was drinking 15 cans of beer every day     Positive BAL for Bordetella pertussis PCR on August 23,  2021.  Patient was treated with 5 days of azithromycin     Hospital-acquired pneumonia with methicillin susceptible Staphylococcus aureus from sputum culture done on August 25, 2021.  The patient had bilateral lower lobe infiltrates on the most recent CAT scan.  On admission patient had no pneumonia     Malignant hypertension.    The patient was previously on labetalol drip            Plan     Continue IV Zosyn 3.375 g every 8 hours  Supportive care  A.m. labs  Prognosis is very guarded  Possible CT of the abdomen and pelvis, chest, and head if patient is able to be stabilized        Ronit Abbott MD  09/03/21  11:40 EDT      Note is dictated utilizing voice recognition software/Dragon

## 2025-05-21 NOTE — DISCHARGE INSTRUCTIONS
No NSAIDS (ibuprofen, naproxen, aleve, advil) or aspirin for 48 hours after procedure. You may take tylenol if needed but do not exceed 10 tablets in 24 hour period     Patient and Family Education  What is a Kidney Biopsy?  A kidney biopsy is used to help doctors diagnose kidney diseases. A small piece of tissue is  taken from the kidney using a special kind of needle. The tissue is sent to the lab to be looked at  under a microscope. The procedure can take up to 1 hour.  Before the Test:  ? If you take medications containing blood thinning medications, you Must ask your  doctor when you should stop taking the medicine. You may need to stop taking the  medicine up to 7 days prior to the test.  ? Do Not Drink Or Eat Anything after midnight the day before the test (unless your  doctor tells you otherwise).  ? You should take any medications you normally take, unless told not to by your doctor  with a small amount of clear liquid. Be sure to take any high blood pressure medications  that your physicians has prescribed.  ? If you have diabetes, ask your Radiologist or Radiology Nurse, during your pretest phone  call, if you should take your medicine or adjust the dosage before the test.  ? Make plans for a ride home.  ? If this is a CT-guided biopsy, please inform your doctor if you have an iodine or  iodinated contrast allergy. Your primary doctor may prescribe special pills to take before  the test.  During the Test:  ? You may be lying on your stomach.  ? You may require medicine that will make you drowsy.  ? A local anesthetic to numb the biopsy site will be used by the doctor.  ? You may feel pressure during the biopsy.  After the Test:  ? You may remain in bed for 4 - 6 hours.  ? Your blood pressure, heart rate and biopsy site will be checked often.  ? Sometimes patient have blood in their urine for the first 24 hours. The urine should  become less pink and more yellow each time you urinate.       Page 2 of  2  Follow these Instructions for a Safer Recovery:  ? Activity:  o Limit your activity for 24 hours after the test  o Do not drive for 24 hours.  o Do not do any heavy lifting, over 10 pounds, for 10 days.  o Avoid intense exercise and contact sports for 2 weeks.  ? Diet:  o You may resume your normal diet.  ? Medicines:  o If you take blood thinning medication ask your doctor when you should start  taking these medications after the test.  Call your Doctor Right Away if you have:  ? Increased pain at the biopsy site.  ? Dizziness or fainting.  ? Shortness of breath or trouble breathing.  ? Back pain.  ? If you are not able to contact your doctor, go to the nearest Emergency Room.  Also, call your Doctor if you have:  ? Redness, swelling, pus-like drainage, or fluid at the biopsy site.  ? Temperature of 100.4°F (38°C) or higher.  ? Increase pain or tenderness at the biopsy site.  ? Bright red urine or blood-tinged urine.  ? Any questions.     How to Reach your Doctor:  Call Dr. Valdes at [phone] with problems or questions.

## 2025-05-23 ENCOUNTER — APPOINTMENT (OUTPATIENT)
Dept: RADIOLOGY | Facility: HOSPITAL | Age: 60
End: 2025-05-23
Payer: COMMERCIAL

## 2025-05-23 ENCOUNTER — HOSPITAL ENCOUNTER (OUTPATIENT)
Facility: HOSPITAL | Age: 60
Setting detail: OBSERVATION
Discharge: HOME HEALTH | End: 2025-05-25
Attending: INTERNAL MEDICINE | Admitting: INTERNAL MEDICINE
Payer: COMMERCIAL

## 2025-05-23 ENCOUNTER — APPOINTMENT (OUTPATIENT)
Dept: CARDIOLOGY | Facility: HOSPITAL | Age: 60
End: 2025-05-23
Payer: COMMERCIAL

## 2025-05-23 DIAGNOSIS — R60.0 LEG EDEMA: ICD-10-CM

## 2025-05-23 DIAGNOSIS — R20.0 LEFT SIDED NUMBNESS: Primary | ICD-10-CM

## 2025-05-23 DIAGNOSIS — R51.9 ACUTE NONINTRACTABLE HEADACHE, UNSPECIFIED HEADACHE TYPE: ICD-10-CM

## 2025-05-23 DIAGNOSIS — E04.1 THYROID NODULE: ICD-10-CM

## 2025-05-23 DIAGNOSIS — G45.9 TRANSIENT CEREBRAL ISCHEMIA, UNSPECIFIED TYPE: Chronic | ICD-10-CM

## 2025-05-23 PROBLEM — D11.0 PLEOMORPHIC ADENOMA OF PAROTID GLAND: Status: ACTIVE | Noted: 2025-05-23

## 2025-05-23 PROBLEM — N18.9 CKD (CHRONIC KIDNEY DISEASE): Status: ACTIVE | Noted: 2025-05-23

## 2025-05-23 PROBLEM — R20.2 NUMBNESS AND TINGLING OF LEFT ARM AND LEG: Status: ACTIVE | Noted: 2025-05-23

## 2025-05-23 PROBLEM — F31.81 BIPOLAR 2 DISORDER, MAJOR DEPRESSIVE EPISODE (MULTI): Status: ACTIVE | Noted: 2025-05-23

## 2025-05-23 PROBLEM — F17.200 TOBACCO DEPENDENCE: Status: ACTIVE | Noted: 2025-05-23

## 2025-05-23 PROBLEM — J44.9 COPD (CHRONIC OBSTRUCTIVE PULMONARY DISEASE) (MULTI): Status: ACTIVE | Noted: 2025-05-23

## 2025-05-23 LAB
ALBUMIN SERPL BCP-MCNC: 3.7 G/DL (ref 3.4–5)
ALP SERPL-CCNC: 88 U/L (ref 33–110)
ALT SERPL W P-5'-P-CCNC: 17 U/L (ref 7–45)
ANION GAP SERPL CALCULATED.3IONS-SCNC: 11 MMOL/L (ref 10–20)
APTT PPP: 28.8 SECONDS (ref 22–32.5)
AST SERPL W P-5'-P-CCNC: 22 U/L (ref 9–39)
BASOPHILS # BLD AUTO: 0.09 X10*3/UL (ref 0–0.1)
BASOPHILS NFR BLD AUTO: 1.4 %
BILIRUB SERPL-MCNC: 0.7 MG/DL (ref 0–1.2)
BUN SERPL-MCNC: 15 MG/DL (ref 6–23)
CALCIUM SERPL-MCNC: 9.1 MG/DL (ref 8.6–10.3)
CARDIAC TROPONIN I PNL SERPL HS: 6 NG/L (ref 0–13)
CHLORIDE SERPL-SCNC: 104 MMOL/L (ref 98–107)
CO2 SERPL-SCNC: 27 MMOL/L (ref 21–32)
CREAT SERPL-MCNC: 1.12 MG/DL (ref 0.5–1.05)
EGFRCR SERPLBLD CKD-EPI 2021: 57 ML/MIN/1.73M*2
EOSINOPHIL # BLD AUTO: 0.25 X10*3/UL (ref 0–0.7)
EOSINOPHIL NFR BLD AUTO: 4 %
ERYTHROCYTE [DISTWIDTH] IN BLOOD BY AUTOMATED COUNT: 13.8 % (ref 11.5–14.5)
GLUCOSE BLD MANUAL STRIP-MCNC: 89 MG/DL (ref 74–99)
GLUCOSE BLD MANUAL STRIP-MCNC: 98 MG/DL (ref 74–99)
GLUCOSE SERPL-MCNC: 81 MG/DL (ref 74–99)
HCT VFR BLD AUTO: 39.9 % (ref 36–46)
HGB BLD-MCNC: 13.3 G/DL (ref 12–16)
IMM GRANULOCYTES # BLD AUTO: 0.01 X10*3/UL (ref 0–0.7)
IMM GRANULOCYTES NFR BLD AUTO: 0.2 % (ref 0–0.9)
INR PPP: 1 (ref 0.9–1.2)
LYMPHOCYTES # BLD AUTO: 2.49 X10*3/UL (ref 1.2–4.8)
LYMPHOCYTES NFR BLD AUTO: 40 %
MCH RBC QN AUTO: 31.6 PG (ref 26–34)
MCHC RBC AUTO-ENTMCNC: 33.3 G/DL (ref 32–36)
MCV RBC AUTO: 95 FL (ref 80–100)
MONOCYTES # BLD AUTO: 0.57 X10*3/UL (ref 0.1–1)
MONOCYTES NFR BLD AUTO: 9.1 %
NEUTROPHILS # BLD AUTO: 2.82 X10*3/UL (ref 1.2–7.7)
NEUTROPHILS NFR BLD AUTO: 45.3 %
NRBC BLD-RTO: 0 /100 WBCS (ref 0–0)
PLATELET # BLD AUTO: 167 X10*3/UL (ref 150–450)
POCT GLUCOSE: 89 MG/DL (ref 74–99)
POTASSIUM SERPL-SCNC: 4 MMOL/L (ref 3.5–5.3)
PROT SERPL-MCNC: 7 G/DL (ref 6.4–8.2)
PROTHROMBIN TIME: 10.8 SECONDS (ref 9.3–12.7)
RBC # BLD AUTO: 4.21 X10*6/UL (ref 4–5.2)
SODIUM SERPL-SCNC: 138 MMOL/L (ref 136–145)
WBC # BLD AUTO: 6.2 X10*3/UL (ref 4.4–11.3)

## 2025-05-23 PROCEDURE — 36415 COLL VENOUS BLD VENIPUNCTURE: CPT

## 2025-05-23 PROCEDURE — 2500000001 HC RX 250 WO HCPCS SELF ADMINISTERED DRUGS (ALT 637 FOR MEDICARE OP)

## 2025-05-23 PROCEDURE — 96374 THER/PROPH/DIAG INJ IV PUSH: CPT | Mod: 59

## 2025-05-23 PROCEDURE — 70450 CT HEAD/BRAIN W/O DYE: CPT | Performed by: RADIOLOGY

## 2025-05-23 PROCEDURE — 2500000004 HC RX 250 GENERAL PHARMACY W/ HCPCS (ALT 636 FOR OP/ED): Mod: JZ

## 2025-05-23 PROCEDURE — 82947 ASSAY GLUCOSE BLOOD QUANT: CPT | Mod: 91

## 2025-05-23 PROCEDURE — 96376 TX/PRO/DX INJ SAME DRUG ADON: CPT | Mod: 59

## 2025-05-23 PROCEDURE — 96361 HYDRATE IV INFUSION ADD-ON: CPT

## 2025-05-23 PROCEDURE — 82947 ASSAY GLUCOSE BLOOD QUANT: CPT

## 2025-05-23 PROCEDURE — 99222 1ST HOSP IP/OBS MODERATE 55: CPT | Performed by: INTERNAL MEDICINE

## 2025-05-23 PROCEDURE — 85610 PROTHROMBIN TIME: CPT

## 2025-05-23 PROCEDURE — G0378 HOSPITAL OBSERVATION PER HR: HCPCS

## 2025-05-23 PROCEDURE — 70498 CT ANGIOGRAPHY NECK: CPT | Performed by: STUDENT IN AN ORGANIZED HEALTH CARE EDUCATION/TRAINING PROGRAM

## 2025-05-23 PROCEDURE — 99291 CRITICAL CARE FIRST HOUR: CPT

## 2025-05-23 PROCEDURE — 70450 CT HEAD/BRAIN W/O DYE: CPT

## 2025-05-23 PROCEDURE — 93005 ELECTROCARDIOGRAM TRACING: CPT

## 2025-05-23 PROCEDURE — 85025 COMPLETE CBC W/AUTO DIFF WBC: CPT

## 2025-05-23 PROCEDURE — 84484 ASSAY OF TROPONIN QUANT: CPT

## 2025-05-23 PROCEDURE — 96375 TX/PRO/DX INJ NEW DRUG ADDON: CPT | Mod: 59

## 2025-05-23 PROCEDURE — 2500000001 HC RX 250 WO HCPCS SELF ADMINISTERED DRUGS (ALT 637 FOR MEDICARE OP): Performed by: INTERNAL MEDICINE

## 2025-05-23 PROCEDURE — 99285 EMERGENCY DEPT VISIT HI MDM: CPT | Mod: 25

## 2025-05-23 PROCEDURE — 80053 COMPREHEN METABOLIC PANEL: CPT

## 2025-05-23 PROCEDURE — 85730 THROMBOPLASTIN TIME PARTIAL: CPT

## 2025-05-23 PROCEDURE — 70498 CT ANGIOGRAPHY NECK: CPT

## 2025-05-23 PROCEDURE — 70496 CT ANGIOGRAPHY HEAD: CPT | Performed by: STUDENT IN AN ORGANIZED HEALTH CARE EDUCATION/TRAINING PROGRAM

## 2025-05-23 PROCEDURE — 2500000004 HC RX 250 GENERAL PHARMACY W/ HCPCS (ALT 636 FOR OP/ED): Mod: JZ | Performed by: INTERNAL MEDICINE

## 2025-05-23 PROCEDURE — 2550000001 HC RX 255 CONTRASTS

## 2025-05-23 RX ORDER — DIPHENHYDRAMINE HYDROCHLORIDE 50 MG/ML
25 INJECTION, SOLUTION INTRAMUSCULAR; INTRAVENOUS ONCE
Status: COMPLETED | OUTPATIENT
Start: 2025-05-23 | End: 2025-05-23

## 2025-05-23 RX ORDER — POLYETHYLENE GLYCOL 3350 17 G/17G
17 POWDER, FOR SOLUTION ORAL DAILY PRN
Status: DISCONTINUED | OUTPATIENT
Start: 2025-05-23 | End: 2025-05-25 | Stop reason: HOSPADM

## 2025-05-23 RX ORDER — ACETAMINOPHEN 325 MG/1
975 TABLET ORAL ONCE
Status: COMPLETED | OUTPATIENT
Start: 2025-05-23 | End: 2025-05-23

## 2025-05-23 RX ORDER — HEPARIN SODIUM 5000 [USP'U]/ML
5000 INJECTION, SOLUTION INTRAVENOUS; SUBCUTANEOUS EVERY 12 HOURS
Status: DISCONTINUED | OUTPATIENT
Start: 2025-05-24 | End: 2025-05-25 | Stop reason: HOSPADM

## 2025-05-23 RX ORDER — BUSPIRONE HYDROCHLORIDE 15 MG/1
30 TABLET ORAL 2 TIMES DAILY
Status: DISCONTINUED | OUTPATIENT
Start: 2025-05-23 | End: 2025-05-23

## 2025-05-23 RX ORDER — FLUTICASONE FUROATE AND VILANTEROL 200; 25 UG/1; UG/1
1 POWDER RESPIRATORY (INHALATION)
Status: DISCONTINUED | OUTPATIENT
Start: 2025-05-24 | End: 2025-05-25 | Stop reason: HOSPADM

## 2025-05-23 RX ORDER — SUMATRIPTAN SUCCINATE 25 MG/1
100 TABLET ORAL ONCE AS NEEDED
Status: DISCONTINUED | OUTPATIENT
Start: 2025-05-23 | End: 2025-05-23

## 2025-05-23 RX ORDER — ATORVASTATIN CALCIUM 20 MG/1
20 TABLET, FILM COATED ORAL DAILY
Status: DISCONTINUED | OUTPATIENT
Start: 2025-05-23 | End: 2025-05-25 | Stop reason: HOSPADM

## 2025-05-23 RX ORDER — HYDROXYZINE HYDROCHLORIDE 25 MG/1
25 TABLET, FILM COATED ORAL EVERY 8 HOURS PRN
Status: DISCONTINUED | OUTPATIENT
Start: 2025-05-23 | End: 2025-05-25 | Stop reason: HOSPADM

## 2025-05-23 RX ORDER — PROCHLORPERAZINE EDISYLATE 5 MG/ML
5 INJECTION INTRAMUSCULAR; INTRAVENOUS ONCE
Status: COMPLETED | OUTPATIENT
Start: 2025-05-23 | End: 2025-05-23

## 2025-05-23 RX ORDER — LABETALOL HYDROCHLORIDE 5 MG/ML
10 INJECTION, SOLUTION INTRAVENOUS EVERY 10 MIN PRN
Status: DISCONTINUED | OUTPATIENT
Start: 2025-05-23 | End: 2025-05-25 | Stop reason: HOSPADM

## 2025-05-23 RX ORDER — POTASSIUM CHLORIDE 20 MEQ/1
20 TABLET, EXTENDED RELEASE ORAL DAILY
Status: DISCONTINUED | OUTPATIENT
Start: 2025-05-23 | End: 2025-05-25 | Stop reason: HOSPADM

## 2025-05-23 RX ORDER — ACETAMINOPHEN 500 MG
5 TABLET ORAL NIGHTLY PRN
Status: DISCONTINUED | OUTPATIENT
Start: 2025-05-23 | End: 2025-05-25 | Stop reason: HOSPADM

## 2025-05-23 RX ORDER — TORSEMIDE 100 MG/1
100 TABLET ORAL DAILY
Status: DISCONTINUED | OUTPATIENT
Start: 2025-05-23 | End: 2025-05-25 | Stop reason: HOSPADM

## 2025-05-23 RX ORDER — ONDANSETRON HYDROCHLORIDE 2 MG/ML
4 INJECTION, SOLUTION INTRAVENOUS EVERY 8 HOURS PRN
Status: DISCONTINUED | OUTPATIENT
Start: 2025-05-23 | End: 2025-05-25 | Stop reason: HOSPADM

## 2025-05-23 RX ORDER — MONTELUKAST SODIUM 10 MG/1
10 TABLET ORAL DAILY
Status: DISCONTINUED | OUTPATIENT
Start: 2025-05-23 | End: 2025-05-25 | Stop reason: HOSPADM

## 2025-05-23 RX ORDER — DEXAMETHASONE SODIUM PHOSPHATE 10 MG/ML
10 INJECTION INTRAMUSCULAR; INTRAVENOUS ONCE
Status: COMPLETED | OUTPATIENT
Start: 2025-05-23 | End: 2025-05-23

## 2025-05-23 RX ORDER — ZIPRASIDONE HYDROCHLORIDE 40 MG/1
40 CAPSULE ORAL NIGHTLY
Status: DISCONTINUED | OUTPATIENT
Start: 2025-05-23 | End: 2025-05-25 | Stop reason: HOSPADM

## 2025-05-23 RX ORDER — ENOXAPARIN SODIUM 100 MG/ML
40 INJECTION SUBCUTANEOUS EVERY 24 HOURS
Status: DISCONTINUED | OUTPATIENT
Start: 2025-05-23 | End: 2025-05-23

## 2025-05-23 RX ORDER — ALBUTEROL SULFATE 0.83 MG/ML
2.5 SOLUTION RESPIRATORY (INHALATION) EVERY 6 HOURS PRN
Status: DISCONTINUED | OUTPATIENT
Start: 2025-05-23 | End: 2025-05-25 | Stop reason: HOSPADM

## 2025-05-23 RX ORDER — FLUOXETINE 10 MG/1
10 CAPSULE ORAL DAILY
Status: DISCONTINUED | OUTPATIENT
Start: 2025-05-23 | End: 2025-05-25 | Stop reason: HOSPADM

## 2025-05-23 RX ADMIN — ACETAMINOPHEN 975 MG: 325 TABLET ORAL at 17:08

## 2025-05-23 RX ADMIN — DIPHENHYDRAMINE HYDROCHLORIDE 25 MG: 50 INJECTION, SOLUTION INTRAMUSCULAR; INTRAVENOUS at 17:09

## 2025-05-23 RX ADMIN — PROCHLORPERAZINE EDISYLATE 5 MG: 5 INJECTION INTRAMUSCULAR; INTRAVENOUS at 23:10

## 2025-05-23 RX ADMIN — ZIPRASIDONE HYDROCHLORIDE 40 MG: 40 CAPSULE ORAL at 22:42

## 2025-05-23 RX ADMIN — PROCHLORPERAZINE EDISYLATE 5 MG: 5 INJECTION INTRAMUSCULAR; INTRAVENOUS at 17:08

## 2025-05-23 RX ADMIN — ATORVASTATIN CALCIUM 20 MG: 20 TABLET, FILM COATED ORAL at 22:42

## 2025-05-23 RX ADMIN — DEXAMETHASONE SODIUM PHOSPHATE 4 MG: 4 INJECTION, SOLUTION INTRAMUSCULAR; INTRAVENOUS at 23:10

## 2025-05-23 RX ADMIN — IOHEXOL 75 ML: 350 INJECTION, SOLUTION INTRAVENOUS at 16:18

## 2025-05-23 RX ADMIN — DIPHENHYDRAMINE HYDROCHLORIDE 25 MG: 50 INJECTION, SOLUTION INTRAMUSCULAR; INTRAVENOUS at 23:10

## 2025-05-23 RX ADMIN — DEXAMETHASONE SODIUM PHOSPHATE 10 MG: 10 INJECTION INTRAMUSCULAR; INTRAVENOUS at 18:33

## 2025-05-23 RX ADMIN — SODIUM CHLORIDE 500 ML: 900 INJECTION, SOLUTION INTRAVENOUS at 17:08

## 2025-05-23 RX ADMIN — BUSPIRONE HYDROCHLORIDE 15 MG: 5 TABLET ORAL at 22:42

## 2025-05-23 SDOH — ECONOMIC STABILITY: FOOD INSECURITY: WITHIN THE PAST 12 MONTHS, THE FOOD YOU BOUGHT JUST DIDN'T LAST AND YOU DIDN'T HAVE MONEY TO GET MORE.: NEVER TRUE

## 2025-05-23 SDOH — ECONOMIC STABILITY: INCOME INSECURITY: IN THE PAST 12 MONTHS HAS THE ELECTRIC, GAS, OIL, OR WATER COMPANY THREATENED TO SHUT OFF SERVICES IN YOUR HOME?: NO

## 2025-05-23 SDOH — SOCIAL STABILITY: SOCIAL INSECURITY: DO YOU FEEL ANYONE HAS EXPLOITED OR TAKEN ADVANTAGE OF YOU FINANCIALLY OR OF YOUR PERSONAL PROPERTY?: NO

## 2025-05-23 SDOH — SOCIAL STABILITY: SOCIAL INSECURITY: ARE YOU OR HAVE YOU BEEN THREATENED OR ABUSED PHYSICALLY, EMOTIONALLY, OR SEXUALLY BY ANYONE?: NO

## 2025-05-23 SDOH — SOCIAL STABILITY: SOCIAL INSECURITY: WITHIN THE LAST YEAR, HAVE YOU BEEN AFRAID OF YOUR PARTNER OR EX-PARTNER?: NO

## 2025-05-23 SDOH — HEALTH STABILITY: MENTAL HEALTH: CONTENT: UNREMARKABLE

## 2025-05-23 SDOH — SOCIAL STABILITY: SOCIAL INSECURITY: ARE THERE ANY APPARENT SIGNS OF INJURIES/BEHAVIORS THAT COULD BE RELATED TO ABUSE/NEGLECT?: NO

## 2025-05-23 SDOH — SOCIAL STABILITY: SOCIAL INSECURITY: DO YOU FEEL UNSAFE GOING BACK TO THE PLACE WHERE YOU ARE LIVING?: NO

## 2025-05-23 SDOH — ECONOMIC STABILITY: FOOD INSECURITY: WITHIN THE PAST 12 MONTHS, YOU WORRIED THAT YOUR FOOD WOULD RUN OUT BEFORE YOU GOT THE MONEY TO BUY MORE.: NEVER TRUE

## 2025-05-23 SDOH — SOCIAL STABILITY: SOCIAL INSECURITY: FAMILY BEHAVIORS: UNABLE TO ASSESS

## 2025-05-23 SDOH — SOCIAL STABILITY: SOCIAL INSECURITY: HAS ANYONE EVER THREATENED TO HURT YOUR FAMILY OR YOUR PETS?: NO

## 2025-05-23 SDOH — SOCIAL STABILITY: SOCIAL INSECURITY: HAVE YOU HAD THOUGHTS OF HARMING ANYONE ELSE?: NO

## 2025-05-23 SDOH — SOCIAL STABILITY: SOCIAL INSECURITY
WITHIN THE LAST YEAR, HAVE YOU BEEN RAPED OR FORCED TO HAVE ANY KIND OF SEXUAL ACTIVITY BY YOUR PARTNER OR EX-PARTNER?: NO

## 2025-05-23 SDOH — SOCIAL STABILITY: SOCIAL INSECURITY: WITHIN THE LAST YEAR, HAVE YOU BEEN HUMILIATED OR EMOTIONALLY ABUSED IN OTHER WAYS BY YOUR PARTNER OR EX-PARTNER?: NO

## 2025-05-23 SDOH — SOCIAL STABILITY: SOCIAL INSECURITY
WITHIN THE LAST YEAR, HAVE YOU BEEN KICKED, HIT, SLAPPED, OR OTHERWISE PHYSICALLY HURT BY YOUR PARTNER OR EX-PARTNER?: NO

## 2025-05-23 SDOH — SOCIAL STABILITY: SOCIAL NETWORK: PARENT/GUARDIAN/SIGNIFICANT OTHER INVOLVEMENT: NO INVOLVEMENT

## 2025-05-23 SDOH — HEALTH STABILITY: MENTAL HEALTH: BEHAVIORAL HEALTH(WDL): EXCEPTIONS TO WDL

## 2025-05-23 SDOH — SOCIAL STABILITY: SOCIAL NETWORK: EMOTIONAL SUPPORT GIVEN: REASSURE

## 2025-05-23 SDOH — SOCIAL STABILITY: SOCIAL INSECURITY: WERE YOU ABLE TO COMPLETE ALL THE BEHAVIORAL HEALTH SCREENINGS?: YES

## 2025-05-23 SDOH — SOCIAL STABILITY: SOCIAL NETWORK: VISITOR BEHAVIORS: UNABLE TO ASSESS

## 2025-05-23 SDOH — SOCIAL STABILITY: SOCIAL INSECURITY: HAVE YOU HAD ANY THOUGHTS OF HARMING ANYONE ELSE?: NO

## 2025-05-23 SDOH — SOCIAL STABILITY: SOCIAL INSECURITY: DOES ANYONE TRY TO KEEP YOU FROM HAVING/CONTACTING OTHER FRIENDS OR DOING THINGS OUTSIDE YOUR HOME?: NO

## 2025-05-23 SDOH — SOCIAL STABILITY: SOCIAL INSECURITY: ABUSE: ADULT

## 2025-05-23 SDOH — HEALTH STABILITY: MENTAL HEALTH: BEHAVIORS/MOOD: COOPERATIVE;CALM

## 2025-05-23 ASSESSMENT — ACTIVITIES OF DAILY LIVING (ADL)
WALKS IN HOME: INDEPENDENT
DRESSING YOURSELF: INDEPENDENT
ASSISTIVE_DEVICE: EYEGLASSES
LACK_OF_TRANSPORTATION: NO
JUDGMENT_ADEQUATE_SAFELY_COMPLETE_DAILY_ACTIVITIES: YES
GROOMING: INDEPENDENT
HEARING - RIGHT EAR: FUNCTIONAL
TOILETING: INDEPENDENT
ADEQUATE_TO_COMPLETE_ADL: YES
HEARING - LEFT EAR: FUNCTIONAL
BATHING: INDEPENDENT
PATIENT'S MEMORY ADEQUATE TO SAFELY COMPLETE DAILY ACTIVITIES?: YES
FEEDING YOURSELF: INDEPENDENT

## 2025-05-23 ASSESSMENT — COGNITIVE AND FUNCTIONAL STATUS - GENERAL
PATIENT BASELINE BEDBOUND: NO
MOBILITY SCORE: 24
DAILY ACTIVITIY SCORE: 24

## 2025-05-23 ASSESSMENT — PAIN DESCRIPTION - LOCATION: LOCATION: HEAD

## 2025-05-23 ASSESSMENT — PATIENT HEALTH QUESTIONNAIRE - PHQ9
1. LITTLE INTEREST OR PLEASURE IN DOING THINGS: NOT AT ALL
2. FEELING DOWN, DEPRESSED OR HOPELESS: NOT AT ALL
SUM OF ALL RESPONSES TO PHQ9 QUESTIONS 1 & 2: 0

## 2025-05-23 ASSESSMENT — LIFESTYLE VARIABLES
HOW MANY STANDARD DRINKS CONTAINING ALCOHOL DO YOU HAVE ON A TYPICAL DAY: PATIENT DOES NOT DRINK
HOW OFTEN DO YOU HAVE 6 OR MORE DRINKS ON ONE OCCASION: NEVER
SKIP TO QUESTIONS 9-10: 1
AUDIT-C TOTAL SCORE: 0
HOW OFTEN DO YOU HAVE A DRINK CONTAINING ALCOHOL: NEVER
AUDIT-C TOTAL SCORE: 0

## 2025-05-23 ASSESSMENT — PAIN SCALES - GENERAL
PAINLEVEL_OUTOF10: 7
PAINLEVEL_OUTOF10: 8

## 2025-05-23 ASSESSMENT — PAIN DESCRIPTION - DESCRIPTORS: DESCRIPTORS: ACHING

## 2025-05-23 ASSESSMENT — PAIN DESCRIPTION - PAIN TYPE: TYPE: ACUTE PAIN

## 2025-05-23 ASSESSMENT — PAIN - FUNCTIONAL ASSESSMENT
PAIN_FUNCTIONAL_ASSESSMENT: 0-10
PAIN_FUNCTIONAL_ASSESSMENT: 0-10

## 2025-05-23 ASSESSMENT — PAIN DESCRIPTION - FREQUENCY: FREQUENCY: CONSTANT/CONTINUOUS

## 2025-05-23 ASSESSMENT — PAIN DESCRIPTION - ONSET: ONSET: GRADUAL

## 2025-05-23 ASSESSMENT — PAIN DESCRIPTION - PROGRESSION: CLINICAL_PROGRESSION: GRADUALLY WORSENING

## 2025-05-23 NOTE — ASSESSMENT & PLAN NOTE
Will obtain TSH and reflex T4  Consider thyroid ultrasound based off of above results either inpatient or outpatient.  Defer to PCP  Referral to endocrinology at discharge for consideration of biopsy if necessary

## 2025-05-23 NOTE — ASSESSMENT & PLAN NOTE
Admitted to [facility] unit  Continuous telemetry pulse oximetry monitoring  Neurology consultation appreciate recs  CT brain without contrast appreciated as well as CT Angio Head and Neck  Obtain TTE with bubble  Defer Carotid Arterial Doppler Ultrasound to Neurology   MRI head w/o IV Contrast on hold as the patient has a Spinal Cord Stimulator (BS) for past 15 years. Defer MRI/MRA to Neurology considering we have a CT Angio Head/Neck already on-file if MRI is compatible. Patient was instructed that SCS is non-MRI compatible in the past.  Speech and Swallow Evaluation   Permissive HTN Management  PT/OT/Rehabilitation Consult   Start ASA per Neurology. Patient had kidney biopsy this past Wednesday./Continue home statin  Neuro checks Q shift  Up with Assist   Aspiration Precautions   AM HgbA1c and Lipid Profile

## 2025-05-23 NOTE — ED PROVIDER NOTES
HPI   Chief Complaint   Patient presents with    Headache       HPI  Patient is a 60-year-old female with history of migraines, hypertension, COPD who presents for evaluation of high blood pressure and headache.  Patient reports that the headache started yesterday morning.  States the headache came on gradually throughout the course of the day.  States that it is a throbbing pain in the top of her head that does radiate forward to the front of her forehead.  Endorses nausea, photosensitivity as well as sound sensitivity associated with her symptoms.  She states that the headache did not feel like her typical migraine so she did not take her migraine medication.  She states that starting at around 2 PM today she also developed some numbness in her left face and also has decreased sensation in her left upper extremity and lower extremity compared to the right side of her body.  She denies any weakness, facial droop or vision changes.  States she took Tylenol which did her relieve her headache yesterday but not today.  Denies chest pain or shortness of breath.  No vomiting or diarrhea.  Otherwise has no acute complaints.      Patient History   Medical History[1]  Surgical History[2]  Family History[3]  Social History[4]    Physical Exam   ED Triage Vitals [05/23/25 1546]   Temperature Heart Rate Respirations BP   36.8 °C (98.2 °F) 87 18 152/75      Pulse Ox Temp Source Heart Rate Source Patient Position   98 % Temporal Monitor Sitting      BP Location FiO2 (%)     Left arm --       Physical Exam  Vitals and nursing note reviewed.   Constitutional:       General: She is not in acute distress.     Appearance: She is well-developed.   HENT:      Head: Normocephalic and atraumatic.   Eyes:      Conjunctiva/sclera: Conjunctivae normal.   Cardiovascular:      Rate and Rhythm: Normal rate and regular rhythm.      Heart sounds: No murmur heard.  Pulmonary:      Effort: Pulmonary effort is normal. No respiratory distress.       Breath sounds: Normal breath sounds.   Abdominal:      Palpations: Abdomen is soft.      Tenderness: There is no abdominal tenderness.   Musculoskeletal:         General: No swelling.      Cervical back: Neck supple.   Skin:     General: Skin is warm and dry.      Capillary Refill: Capillary refill takes less than 2 seconds.   Neurological:      Mental Status: She is alert.      Comments: Alert and oriented to person place and time.  Symmetric strength of bilateral upper and lower extremities, no facial droop aphasia or dysarthria.  Subjective decrease sensation of the left face, upper extremity and lower extremity compared to right.  NIH stroke scale score of 1.   Psychiatric:         Mood and Affect: Mood normal.           ED Course & MDM   ED Course as of 05/24/25 1145   Fri May 23, 2025   1619 I did discuss the patient presentation with telestroke neurologist.  Feel symptoms are nondisabling at this point thus no indication for tPA or TNK and may be related to a migraine headache he does advise that we appreciate the patient ambulating and if she can walk then continue with migraine treatment and if symptoms improve likely migraine headache as etiology but if persistent pain or patient has difficulty with ambulation will reassess [JJ]   1640 EKG interpreted by myself independently, EKG shows normal sinus rhythm, rate of 96 bpm, MA interval 176, QRS 82, , QTc 449, ST elevation or depression, negative for acute MI. [VASU]   1818 Patient reports she still has a headache.  No real improvement in pain.  Will provide additional treatment. [JJ]      ED Course User Index  [VASU] Stalin Rossi DO  [JJ] Blessing Hendricks PA-C         Diagnoses as of 05/24/25 1145   Left sided numbness   Acute nonintractable headache, unspecified headache type                 No data recorded     Girard Coma Scale Score: 15 (05/23/25 2100 : Rowena Elmore RN)       NIH Stroke Scale: 0 (05/23/25 2100 : Rowena Elmore RN)                    Medical Decision Making  Parts of this chart have been completed using voice recognition software. Please excuse any errors of transcription.  My thought process and reason for plan has been formulated from the time that I saw the patient until the time of disposition and is not specific to one specific moment during their visit and furthermore my MDM encompasses this entire chart and not only this text box.      HPI: Detailed above.    Exam: A medically appropriate exam performed, outlined above, given the known history and presentation.    History obtained from: Patient    Medications given during visit:  Medications   albuterol 2.5 mg /3 mL (0.083 %) nebulizer solution 2.5 mg (has no administration in time range)   atorvastatin (Lipitor) tablet 20 mg (20 mg oral Given 5/23/25 2242)   FLUoxetine (PROzac) capsule 10 mg (10 mg oral Not Given 5/23/25 2212)   fluticasone furoate-vilanteroL (Breo Ellipta) 200-25 mcg/dose inhaler 1 puff (1 puff inhalation Given 5/24/25 0844)   hydrOXYzine HCL (Atarax) tablet 25 mg (has no administration in time range)   montelukast (Singulair) tablet 10 mg (10 mg oral Not Given 5/23/25 2212)   potassium chloride CR (Klor-Con M20) ER tablet 20 mEq (20 mEq oral Given 5/24/25 0850)   torsemide (Demadex) tablet 100 mg (100 mg oral Not Given 5/23/25 2210)   ondansetron (Zofran) injection 4 mg (has no administration in time range)   melatonin tablet 5 mg (has no administration in time range)   oxygen (O2) therapy (has no administration in time range)   labetaloL (Normodyne,Trandate) injection 10 mg (has no administration in time range)   perflutren lipid microspheres (Definity) injection 0.5-10 mL of dilution (has no administration in time range)   sulfur hexafluoride microsphr (Lumason) injection 24.28 mg (has no administration in time range)   perflutren protein A microsphere (Optison) injection 0.5 mL (has no administration in time range)   polyethylene glycol (Glycolax,  Miralax) packet 17 g (has no administration in time range)   busPIRone (Buspar) tablet 15 mg (15 mg oral Given 5/24/25 0850)   ziprasidone (Geodon) capsule 40 mg (40 mg oral Given 5/23/25 2242)   heparin (porcine) injection 5,000 Units (5,000 Units subcutaneous Given 5/24/25 0851)   acetaminophen (Tylenol) tablet 975 mg (975 mg oral Given 5/23/25 1708)   prochlorperazine (Compazine) injection 5 mg (5 mg intravenous Given 5/23/25 1708)   diphenhydrAMINE (BENADryl) injection 25 mg (25 mg intravenous Given 5/23/25 1709)   sodium chloride 0.9 % bolus 500 mL (0 mL intravenous Stopped 5/23/25 1740)   iohexol (OMNIPaque) 350 mg iodine/mL solution 75 mL (75 mL intravenous Given 5/23/25 1618)   dexAMETHasone (Decadron) injection 10 mg (10 mg intravenous Given 5/23/25 1833)   dexAMETHasone (Decadron) injection 4 mg (4 mg intravenous Given 5/23/25 2310)   prochlorperazine (Compazine) injection 5 mg (5 mg intravenous Given 5/23/25 2310)   diphenhydrAMINE (BENADryl) injection 25 mg (25 mg intravenous Given 5/23/25 2310)        Diagnostic/tests  Labs Reviewed   COMPREHENSIVE METABOLIC PANEL - Abnormal       Result Value    Glucose 81      Sodium 138      Potassium 4.0      Chloride 104      Bicarbonate 27      Anion Gap 11      Urea Nitrogen 15      Creatinine 1.12 (*)     eGFR 57 (*)     Calcium 9.1      Albumin 3.7      Alkaline Phosphatase 88      Total Protein 7.0      AST 22      Bilirubin, Total 0.7      ALT 17     CBC WITH AUTO DIFFERENTIAL - Abnormal    WBC 4.5      nRBC 0.0      RBC 4.17      Hemoglobin 13.3      Hematocrit 39.7      MCV 95      MCH 31.9      MCHC 33.5      RDW 14.0      Platelets 162      Neutrophils % 85.3      Immature Granulocytes %, Automated 0.4      Lymphocytes % 13.2      Monocytes % 0.7      Eosinophils % 0.0      Basophils % 0.4      Neutrophils Absolute 3.80      Immature Granulocytes Absolute, Automated 0.02      Lymphocytes Absolute 0.59 (*)     Monocytes Absolute 0.03 (*)     Eosinophils  Absolute 0.00      Basophils Absolute 0.02     BASIC METABOLIC PANEL - Abnormal    Glucose 190 (*)     Sodium 137      Potassium 4.2      Chloride 106      Bicarbonate 23      Anion Gap 12      Urea Nitrogen 17      Creatinine 1.12 (*)     eGFR 57 (*)     Calcium 9.0     TSH WITH REFLEX TO FREE T4 IF ABNORMAL - Abnormal    Thyroid Stimulating Hormone 0.42 (*)     Narrative:     TSH testing is performed using different testing methodology at [facility] than at other [facility]. Direct result comparisons should only be made within the same method.     POCT GLUCOSE - Abnormal    POCT Glucose 190 (*)    POCT GLUCOSE - Abnormal    POCT Glucose 180 (*)    TROPONIN I, HIGH SENSITIVITY - Normal    Troponin I, High Sensitivity 6      Narrative:     Less than 99th percentile of normal range cutoff-  Female and children under 18 years old <14 ng/L; Male <21 ng/L: Negative  Repeat testing should be performed if clinically indicated.     Female and children under 18 years old 14-50 ng/L; Male 21-50 ng/L:  Consistent with possible cardiac damage and possible increased clinical   risk. Serial measurements may help to assess extent of myocardial damage.     >50 ng/L: Consistent with cardiac damage, increased clinical risk and  myocardial infarction. Serial measurements may help assess extent of   myocardial damage.      NOTE: Children less than 1 year old may have higher baseline troponin   levels and results should be interpreted in conjunction with the overall   clinical context.     NOTE: Troponin I testing is performed using a different   testing methodology at [facility] than at other   [facility]. Direct result comparisons should only   be made within the same method.   PROTIME-INR - Normal    Protime 10.8      INR 1.0      Narrative:     INR Therapeutic Range: 2.0-3.5   APTT - Normal    aPTT 28.8     THYROXINE, FREE - Normal    Thyroxine, Free 0.84      Narrative:     Thyroxine Free  testing is performed using different testing methodology at [facility] than at other [facility]. Direct result comparisons should only be made within the same method.    Biotin can cause falsely elevated free T4 results. Patients taking a Biotin dose of up to 10 mg/day should refrain from taking Biotin for 24 hours before sample collection. Patient taking a Biotin dose of >10 mg/day should consult with their physician or the laboratory before the blood draw.   POCT GLUCOSE - Normal    POCT Glucose 89     POCT GLUCOSE - Normal    POCT Glucose 98     POCT GLUCOSE METER - Normal    POCT Glucose 89     CBC WITH AUTO DIFFERENTIAL    WBC 6.2      nRBC 0.0      RBC 4.21      Hemoglobin 13.3      Hematocrit 39.9      MCV 95      MCH 31.6      MCHC 33.3      RDW 13.8      Platelets 167      Neutrophils % 45.3      Immature Granulocytes %, Automated 0.2      Lymphocytes % 40.0      Monocytes % 9.1      Eosinophils % 4.0      Basophils % 1.4      Neutrophils Absolute 2.82      Immature Granulocytes Absolute, Automated 0.01      Lymphocytes Absolute 2.49      Monocytes Absolute 0.57      Eosinophils Absolute 0.25      Basophils Absolute 0.09     LIPID PANEL    Cholesterol 153      HDL-Cholesterol 55.2      Cholesterol/HDL Ratio 2.8      LDL Calculated 86      VLDL 12      Triglycerides 58      Non HDL Cholesterol 98     HEMOGLOBIN A1C   POCT GLUCOSE METER   POCT GLUCOSE METER   POCT GLUCOSE METER   POCT GLUCOSE METER   POCT GLUCOSE METER      CT brain attack angio head and neck W and WO IV contrast   Final Result   No evidence of large vessel occlusion or significant stenosis in the   neck or intracranially. Incidental note made of 0.8 cm right parotid   nodule which may represent an intraparotid lymph node or benign tumor   such as pleomorphic adenoma. 1.3 cm right thyroid nodule. Consider   follow-up thyroid ultrasound.        MACRO:   None        Signed by: Neftaly Johnson 5/23/2025 4:55 PM   Dictation  workstation:   [id number]      CT brain attack head wo IV contrast   Final Result   No evidence of acute cortical infarct or intracranial hemorrhage.                  MACRO:   Payam Leonarda discussed the significance and urgency of this critical   finding by secure chat with  BLESSING HURTADO on 5/23/2025 at 4:30 pm.   (**-RCF-**) Findings:  See findings.             Signed by: Payam Brower 5/23/2025 4:31 PM   Dictation workstation:   [id number]      Transthoracic Echo Complete    (Results Pending)        Considerations/further MDM:  Patient is a 60-year-old female presenting for evaluation of headache, left-sided numbness    Code brain attack was activated upon arrival to the ER for NIH stroke scale score of 1 and left-sided numbness    Patient awake alert in no apparent distress during the visit.  She does have subjective left-sided numbness on exam but otherwise has no focal neurologic deficits.  Her NIH stroke scale score is 1 based on the subjective numbness of her left side.  Able to ambulate without difficulty.  Telestroke was consulted.  Their recommendations are as discussed in ED course.  CT head without intracranial hemorrhage CT angio without large vessel occlusion.  Minor deficits no tPA or TNK recommended per telestroke neurologist.  Patient was treated for migraine headache with some improvement of headache but still persisting numbness of the left side.  Per telestroke neurology recommendations, patient admitted to hospitalist service for further evaluation of her symptoms.  Patient updated on plan of care and agreeable.      Procedure  Critical Care    Performed by: Blessing Hurtado PA-C  Authorized by: Stalin Rossi DO    Critical care provider statement:     Critical care time (minutes):  35    Critical care time was exclusive of:  Separately billable procedures and treating other patients    Critical care was necessary to treat or prevent imminent or life-threatening deterioration of the following  "conditions: Brain attack.    Critical care was time spent personally by me on the following activities:  Development of treatment plan with patient or surrogate, discussions with consultants, evaluation of patient's response to treatment, examination of patient, obtaining history from patient or surrogate, ordering and performing treatments and interventions, ordering and review of laboratory studies, ordering and review of radiographic studies, re-evaluation of patient's condition and review of old charts    Care discussed with: admitting provider           [1]   Past Medical History:  Diagnosis Date    Anxiety and depression     Bipolar 1 disorder (Multi)     Chronic kidney disease     COPD (chronic obstructive pulmonary disease) (Multi)     HTN (hypertension)     LINDA (obstructive sleep apnea)    [2]   Past Surgical History:  Procedure Laterality Date     SECTION, CLASSIC      FOOT Right     GALLBLADDER SURGERY      HYSTERECTOMY      KNEE Right    [3] No family history on file.  [4]   Social History  Tobacco Use    Smoking status: Every Day     Current packs/day: 1.00     Average packs/day: 1 pack/day for 46.0 years (46.0 ttl pk-yrs)     Types: Cigarettes    Smokeless tobacco: Never   Vaping Use    Vaping status: Never Used   Substance Use Topics    Alcohol use: Not Currently     Comment: \"recovering addict\"    Drug use: Defer        Blessing Hendricks PA-C  25 1145    "

## 2025-05-23 NOTE — ASSESSMENT & PLAN NOTE
Continue to monitor BP and adjust antihypertensive medications accordingly  Permissive hypertensive management currently holding patient's home lisinopril 10 mg daily  Restart when appropriate

## 2025-05-23 NOTE — ED TRIAGE NOTES
Patient presents to ED from home via EMS for hypertension and headache. BP upon arrival is 152/75. Patient states she has had the headache since yesterday and tylenol isn't working. Kidney biopsy this past Wednesday. A&Ox4. Patient appears to not take care of herself such as dirty, soiled clothes, and malodorous.

## 2025-05-23 NOTE — ED PROVIDER NOTES
THIS IS MY [facility] SUPERVISORY AND SHARED VISIT NOTE:    I personally saw the patient and made/approved the management plan and take responsibility for the patient management.    History: Patient is a 60-year-old female presenting with chief complaint of a headache.  Patient came in for high blood pressure and headache, states she has been having headache since yesterday, Tylenol is not working, throbbing pain at the top of her head, endorses nausea, photosensitivity as well as sound sensitivity.  States this is not for like her typical migraine, 2 PM today she also developed some numbness in her left face and some decrease sensation in her left upper extremity and left lower extremity denies any other acute complaints at this time.  Patient activated as a code brain attack.    Exam: GENERAL APPEARANCE: Awake and alert.     HEENT: Normocephalic, atraumatic. Extraocular muscles are intact. Pupils equal round and reactive to light.  CHEST: Nontender to palpation. Clear to auscultation bilaterally. No rales, rhonchi, or wheezing.   HEART: S1, S2. Regular rate and rhythm. No murmurs, gallops or rubs.  Strong and equal pulses in the extremities.   ABDOMEN: Soft,.  non-tender.  No rebound or guarding, bowel sounds normal x 4 quadrants  NEUROLOGICAL: Awake, alert and oriented x 3.  Cranial nerves II to XII grossly intact, initial NIH 1      MDM: Patient seen and evaluated at bedside, patient is in no acute distress.  I will order a brain attack workup. Differential diagnosis includes but is not limited to CVA, TIA, electrolyte abnormality, complex migraine.  Patient CBC did not show any leukocytosis or anemia, CMP showed creatinine 1.12, GFR 57, patient's CT brain intact list below, patient will be admitted to the general medicine team for further evaluation and treatment.    Diagnosis: Left-sided numbness, acute non-intractable headache  CT brain attack angio head and neck W and WO IV contrast   Final Result   No evidence of  large vessel occlusion or significant stenosis in the   neck or intracranially. Incidental note made of 0.8 cm right parotid   nodule which may represent an intraparotid lymph node or benign tumor   such as pleomorphic adenoma. 1.3 cm right thyroid nodule. Consider   follow-up thyroid ultrasound.        MACRO:   None        Signed by: Neftaly Johnson 5/23/2025 4:55 PM   Dictation workstation:   [id number]      CT brain attack head wo IV contrast   Final Result   No evidence of acute cortical infarct or intracranial hemorrhage.                  MACRO:   Payam Brower discussed the significance and urgency of this critical   finding by secure chat with  RADHA HURTADO on 5/23/2025 at 4:30 pm.   (**-RCF-**) Findings:  See findings.             Signed by: Payam Brower 5/23/2025 4:31 PM   Dictation workstation:   [id number]      Transthoracic Echo Complete    (Results Pending)     Results for orders placed or performed during the hospital encounter of 05/23/25   CBC and Auto Differential    Collection Time: 05/23/25  4:28 PM   Result Value Ref Range    WBC 6.2 4.4 - 11.3 x10*3/uL    nRBC 0.0 0.0 - 0.0 /100 WBCs    RBC 4.21 4.00 - 5.20 x10*6/uL    Hemoglobin 13.3 12.0 - 16.0 g/dL    Hematocrit 39.9 36.0 - 46.0 %    MCV 95 80 - 100 fL    MCH 31.6 26.0 - 34.0 pg    MCHC 33.3 32.0 - 36.0 g/dL    RDW 13.8 11.5 - 14.5 %    Platelets 167 150 - 450 x10*3/uL    Neutrophils % 45.3 40.0 - 80.0 %    Immature Granulocytes %, Automated 0.2 0.0 - 0.9 %    Lymphocytes % 40.0 13.0 - 44.0 %    Monocytes % 9.1 2.0 - 10.0 %    Eosinophils % 4.0 0.0 - 6.0 %    Basophils % 1.4 0.0 - 2.0 %    Neutrophils Absolute 2.82 1.20 - 7.70 x10*3/uL    Immature Granulocytes Absolute, Automated 0.01 0.00 - 0.70 x10*3/uL    Lymphocytes Absolute 2.49 1.20 - 4.80 x10*3/uL    Monocytes Absolute 0.57 0.10 - 1.00 x10*3/uL    Eosinophils Absolute 0.25 0.00 - 0.70 x10*3/uL    Basophils Absolute 0.09 0.00 - 0.10 x10*3/uL   Comprehensive metabolic panel    Collection  Time: 05/23/25  4:28 PM   Result Value Ref Range    Glucose 81 74 - 99 mg/dL    Sodium 138 136 - 145 mmol/L    Potassium 4.0 3.5 - 5.3 mmol/L    Chloride 104 98 - 107 mmol/L    Bicarbonate 27 21 - 32 mmol/L    Anion Gap 11 10 - 20 mmol/L    Urea Nitrogen 15 6 - 23 mg/dL    Creatinine 1.12 (H) 0.50 - 1.05 mg/dL    eGFR 57 (L) >60 mL/min/1.73m*2    Calcium 9.1 8.6 - 10.3 mg/dL    Albumin 3.7 3.4 - 5.0 g/dL    Alkaline Phosphatase 88 33 - 110 U/L    Total Protein 7.0 6.4 - 8.2 g/dL    AST 22 9 - 39 U/L    Bilirubin, Total 0.7 0.0 - 1.2 mg/dL    ALT 17 7 - 45 U/L   Troponin I, High Sensitivity    Collection Time: 05/23/25  4:28 PM   Result Value Ref Range    Troponin I, High Sensitivity 6 0 - 13 ng/L   Protime-INR    Collection Time: 05/23/25  4:28 PM   Result Value Ref Range    Protime 10.8 9.3 - 12.7 seconds    INR 1.0 0.9 - 1.2   APTT    Collection Time: 05/23/25  4:28 PM   Result Value Ref Range    aPTT 28.8 22.0 - 32.5 seconds   POCT GLUCOSE    Collection Time: 05/23/25  4:32 PM   Result Value Ref Range    POCT Glucose 89 74 - 99 mg/dL   POCT glucose    Collection Time: 05/23/25  4:34 PM   Result Value Ref Range    POCT Glucose 89 74 - 99 mg/dL   POCT GLUCOSE    Collection Time: 05/23/25  8:28 PM   Result Value Ref Range    POCT Glucose 98 74 - 99 mg/dL   Lipid panel    Collection Time: 05/24/25  4:38 AM   Result Value Ref Range    Cholesterol 153 0 - 199 mg/dL    HDL-Cholesterol 55.2 mg/dL    Cholesterol/HDL Ratio 2.8     LDL Calculated 86 <=99 mg/dL    VLDL 12 0 - 40 mg/dL    Triglycerides 58 0 - 149 mg/dL    Non HDL Cholesterol 98 0 - 149 mg/dL   CBC and Auto Differential    Collection Time: 05/24/25  4:38 AM   Result Value Ref Range    WBC 4.5 4.4 - 11.3 x10*3/uL    nRBC 0.0 0.0 - 0.0 /100 WBCs    RBC 4.17 4.00 - 5.20 x10*6/uL    Hemoglobin 13.3 12.0 - 16.0 g/dL    Hematocrit 39.7 36.0 - 46.0 %    MCV 95 80 - 100 fL    MCH 31.9 26.0 - 34.0 pg    MCHC 33.5 32.0 - 36.0 g/dL    RDW 14.0 11.5 - 14.5 %    Platelets  162 150 - 450 x10*3/uL    Neutrophils % 85.3 40.0 - 80.0 %    Immature Granulocytes %, Automated 0.4 0.0 - 0.9 %    Lymphocytes % 13.2 13.0 - 44.0 %    Monocytes % 0.7 2.0 - 10.0 %    Eosinophils % 0.0 0.0 - 6.0 %    Basophils % 0.4 0.0 - 2.0 %    Neutrophils Absolute 3.80 1.20 - 7.70 x10*3/uL    Immature Granulocytes Absolute, Automated 0.02 0.00 - 0.70 x10*3/uL    Lymphocytes Absolute 0.59 (L) 1.20 - 4.80 x10*3/uL    Monocytes Absolute 0.03 (L) 0.10 - 1.00 x10*3/uL    Eosinophils Absolute 0.00 0.00 - 0.70 x10*3/uL    Basophils Absolute 0.02 0.00 - 0.10 x10*3/uL   Basic metabolic panel    Collection Time: 05/24/25  4:38 AM   Result Value Ref Range    Glucose 190 (H) 74 - 99 mg/dL    Sodium 137 136 - 145 mmol/L    Potassium 4.2 3.5 - 5.3 mmol/L    Chloride 106 98 - 107 mmol/L    Bicarbonate 23 21 - 32 mmol/L    Anion Gap 12 10 - 20 mmol/L    Urea Nitrogen 17 6 - 23 mg/dL    Creatinine 1.12 (H) 0.50 - 1.05 mg/dL    eGFR 57 (L) >60 mL/min/1.73m*2    Calcium 9.0 8.6 - 10.3 mg/dL   TSH with reflex to Free T4 if abnormal    Collection Time: 05/24/25  4:38 AM   Result Value Ref Range    Thyroid Stimulating Hormone 0.42 (L) 0.44 - 3.98 mIU/L   Thyroxine, Free    Collection Time: 05/24/25  4:38 AM   Result Value Ref Range    Thyroxine, Free 0.84 0.61 - 1.12 ng/dL   POCT GLUCOSE    Collection Time: 05/24/25  7:28 AM   Result Value Ref Range    POCT Glucose 190 (H) 74 - 99 mg/dL     *Note: Due to a large number of results and/or encounters for the requested time period, some results have not been displayed. A complete set of results can be found in Results Review.     Critical Care Time: 35 minutes excluding time spent performing procedures, treating other patients, and teaching time.    Critical Concern/Vital Organ System Affected: CNS    Critical Intervention: Frequent reassessment, discussion with multiple consultants,.    Please see [facility] note for further details    Sections of this report were created using voice-to-text  technology and may contain errors in translation    Stalin Rossi DO  Emergency Medicine       Stalin Rossi DO  05/24/25 1003

## 2025-05-23 NOTE — H&P
History Of Present Illness      Celine Jhaveri is a 60 y.o. female presenting with Acute Headache-like symptoms with associated High Blood pressure prior to arrival. Patient reports that the headache started yesterday morning. States the headache came on gradually throughout the course of the day. States that it is a throbbing pain in the top of her head that does radiate forward to the front of her forehead. Endorses nausea, photosensitivity as well as sound sensitivity associated with her symptoms. She states that the headache did not feel like her typical migraine so she did not take her migraine medication. She states that starting at around 2 PM today she also developed some numbness in her left face and also has decreased sensation in her left upper extremity and lower extremity compared to the right side of her body. She denies any weakness, facial droop or vision changes. States she took Tylenol which did her relieve her headache yesterday but not today. Denies chest pain or shortness of breath. No vomiting or diarrhea. Otherwise has no acute complaints.     A brain attack was initiated in the ED.  Patient's NIH score was calculated as 1.      The patient's EKG was noted for normal sinus rhythm at 96 bpm  Right superior axis deviation  Low voltage QRS  Suspected arm lead reversal, interpretation assumes no reversal  QTc 449 ms      Patient's vital signs in the ED noted for Tmax 36.8, pulse rate 87, respirate 18, /75.  Patient was saturating 98% on room air.      Patient's ED diagnostic workup noted for CBC with differential that was essentially unremarkable.  The patient's blood chemistry was noted for a slightly elevated creatinine at 1.12.  No major electrolyte abnormalities.  First set troponin level was within normal range.  Patient's coagulation profile was within normal range.      A brain attack was initiated in the ED.  Case discussed with stroke neurology on-call.  IH noted to be 1 in the  ED.  Initial CT brain without IV contrast was noted for the following:    IMPRESSION:  No evidence of acute cortical infarct or intracranial hemorrhage.      A CT angio head and neck was also obtained in the ED.  This note for the following:    IMPRESSION:  No evidence of large vessel occlusion or significant stenosis in the  neck or intracranially. Incidental note made of 0.8 cm right parotid  nodule which may represent an intraparotid lymph node or benign tumor  such as pleomorphic adenoma. 1.3 cm right thyroid nodule. Consider  follow-up thyroid ultrasound.     In the ED the patient was administered acetaminophen 975 mg p.o. x 1.  The patient is given Decadron 10 mg IV push x 1.  Patient was given Benadryl 25 mg IV push x 1.  Patient was given Compazine 5 mg IV push x 1.  Patient was given 500 cc normal saline bolus.    Based off of stroke neurology on-call a request was made to admit the patient for persisting numbness and tingling.  This is in the setting of an acute migraine attack.        Past Medical History      Past Medical History:   Diagnosis Date    Anxiety and depression     Bipolar 1 disorder (Multi)     Chronic kidney disease     COPD (chronic obstructive pulmonary disease) (Multi)     HTN (hypertension)     LINDA (obstructive sleep apnea)          Surgical History        Past Surgical History:   Procedure Laterality Date     SECTION, CLASSIC      FOOT Right     GALLBLADDER SURGERY      HYSTERECTOMY      KNEE Right           Social History    She reports that she has been smoking cigarettes. She has a 46 pack-year smoking history. She has never used smokeless tobacco. She reports that she does not currently use alcohol. Drug use questions deferred to the physician.      Family History    Mother: History of CVA  Father: Possible lung cancer       Allergies      Morphine, Amoxicillin-pot clavulanate, Acetaminophen, Buprenorphine, Cephalexin, Citalopram, Clarithromycin, Ibuprofen, Milnacipran,  "Oxycodone-acetaminophen, Phenobarbital, Pregabalin, Bupropion, Duloxetine, Latex, and Meperidine        Review of Systems    14-point ROS otherwise negative, as per HPI/Interval History.    General: No change in weight. No weakenss. No Fevers/Chills/Night Sweats   Skin: No skin/hair/nail changes. No rashes or sores.  Head:  No trauma. No Headache/nasuea/vomitting.   Eyes: No visual changes. No tearing. No itching.   Ears: No hearing loss. No tinnitus. No vertigo. No discharge.  Nose, Sinuses: No rhinorrhea, No nasal congestion. No epistaxis.  Mouth, Throat, Neck: No bleeding gums, hoarseness, sore throat or swollen neck  Cardiac: No palpitations. No GUTIERREZ. No PND. No Orthopnea.   Respiratory: No Shortness of Breath. No wheezing. No cough. No hemoptysis.   GI: No nausea/vomiting. No indigestion. No diarrhea. No constipation.   Extremities: No numbness or tingling. No paresthesias.   Urinary: No change in urinary frequency. No change in hesitancy. No hematuria. No incontinence.  Neurology: Left-sided facial numbness.  Left arm and leg numbness.         Physical Exam      Constitutional:  Pleasant  Eyes: PERRL, EOMI,   ENMT: mucous membranes dry  Head/Neck: Neck supple, No JVD,   Respiratory/Thorax: Patent airways, CTAB,   Cardiovascular: Regular, rate and rhythm, no murmurs  Gastrointestinal: Soft, non-distended, +BS.  Musculoskeletal: ROM intact, no joint swelling, normal strength  Extremities: peripheral pulses intact; no edema  Neurological: Alert and Oriented x 3; no focal deficits; CN II-XII intact. No asterixis.  Minimal left lower extremity muscle weakness.  Psychological: Appropriate mood and behavior  Skin: No lesions, No rashes.         Last Recorded Vitals  Blood pressure (!) 155/98, pulse 106, temperature 36 °C (96.8 °F), temperature source Temporal, resp. rate 19, height 1.575 m (5' 2\"), weight 95.3 kg (210 lb), SpO2 94%.    Relevant Results    Lab Results   Component Value Date    WBC 6.2 05/23/2025    HGB " 13.3 05/23/2025    HCT 39.9 05/23/2025    MCV 95 05/23/2025     05/23/2025       Lab Results   Component Value Date    GLUCOSE 81 05/23/2025    CALCIUM 9.1 05/23/2025     05/23/2025    K 4.0 05/23/2025    CO2 27 05/23/2025     05/23/2025    BUN 15 05/23/2025    CREATININE 1.12 (H) 05/23/2025       Lab Results   Component Value Date    HGBA1C 5.7 03/16/2018         CT brain attack head wo IV contrast  Result Date: 5/23/2025  Interpreted By:  Payam Brower, STUDY: CT BRAIN ATTACK HEAD WO IV CONTRAST;  5/23/2025 4:17 pm   INDICATION: Signs/Symptoms:Stroke Evaluation.     COMPARISON: 12/06/2022   ACCESSION NUMBER(S): [id number]   ORDERING CLINICIAN: RADHA HURTADO   TECHNIQUE: Noncontrast axial CT scan of head was performed. Angled reformats in brain and bone windows were generated. The images were reviewed in bone, brain, blood and soft tissue windows.   FINDINGS: CSF Spaces: The ventricles, sulci and basal cisterns are within normal limits. There is no extraaxial fluid collection.   Parenchyma:  The grey-white differentiation is intact. There is no mass effect or midline shift.  There is no intracranial hemorrhage.   Calvarium: The calvarium is unremarkable.   Paranasal sinuses and mastoids: Visualized paranasal sinuses and mastoids are remarkable for mucosal thickening of the right maxillary sinus.       No evidence of acute cortical infarct or intracranial hemorrhage.       MACRO: Payam Brower discussed the significance and urgency of this critical finding by secure chat with  RADHA HURTADO on 5/23/2025 at 4:30 pm. (**-RCF-**) Findings:  See findings.     Signed by: Payam Brower 5/23/2025 4:31 PM Dictation workstation:   [id number]        Scheduled medications  Scheduled Medications[1]  Continuous medications  Continuous Medications[2]  PRN medications  PRN Medications[3]        Celine Jhaveri is a 60 y.o. female presenting with Acute Headache-like symptoms with associated High Blood pressure  prior to arrival. Patient reports that the headache started yesterday morning. States the headache came on gradually throughout the course of the day.  Patient admitted for further evaluation and management.        Assessment/Plan   Assessment & Plan  TIA (transient ischemic attack)  Admitted to Stoke unit  Continuous telemetry pulse oximetry monitoring  Neurology consultation appreciate recs  CT brain without contrast appreciated as well as CT Angio Head and Neck  Obtain TTE with bubble  Defer Carotid Arterial Doppler Ultrasound to Neurology   MRI head w/o IV Contrast on hold as the patient has a Spinal Cord Stimulator (BS) for past 15 years. Defer MRI/MRA to Neurology considering we have a CT Angio Head/Neck already on-file if MRI is compatible. Patient was instructed that SCS is non-MRI compatible in the past.  Speech and Swallow Evaluation   Permissive HTN Management  PT/OT/Rehabilitation Consult   Start ASA per Neurology. Patient had kidney biopsy this past Wednesday./Continue home statin  Neuro checks Q shift  Up with Assist   Aspiration Precautions   AM HgbA1c and Lipid Profile    Acute headache  Patient takes Nurtec at home    Numbness and tingling of left arm and leg  Management as above    Pleomorphic adenoma of parotid gland  Incidental finding  Will defer further management to neurology    Thyroid nodule  Will obtain TSH and reflex T4  Consider thyroid ultrasound based off of above results either inpatient or outpatient.  Defer to PCP  Referral to endocrinology at discharge for consideration of biopsy if necessary    Congestive heart failure  Continue home diuretic therapy  Does not appear to be in acute exacerbation    COPD (chronic obstructive pulmonary disease) (Multi)  Continue home ICS  Continue Bronchodilator PRN    CKD (chronic kidney disease)  Patient follows with Dr. Valdes  Her Cr has improved today to 1.12  She underwent Kidney biopsy this past Wednesday    Bipolar 2 disorder, major depressive  episode (Multi)  Patient take Geodon 40 mg PO at bedtime    Anxiety disorder, unspecified  Continue home BuSpar dose 15 mg PO BID     Depressive disorder  Continue home SSRI therapy    Essential hypertension  Continue to monitor BP and adjust antihypertensive medications accordingly  Permissive hypertensive management currently holding patient's home lisinopril 10 mg daily  Restart when appropriate    Tobacco dependence  Smoking cessation counseling  Patient has no desire to stop  NRT ordered         Disposition:  Continue to monitor inpatient, await consultant recommendations, await test results, and await clinical improvement      This Dictation was Transcribed using a Nuance Dragon Voice Recognition System Device (with Compatible Computer + Software) and as such may contain Grammatical Errors and Unintentional Typing Misprints.        I spent 32 minutes in the professional and overall care of this patient.      Emigdio Barone MD           [1] atorvastatin, 20 mg, oral, Daily  busPIRone, 15 mg, oral, BID  FLUoxetine, 10 mg, oral, Daily  [START ON 5/24/2025] fluticasone furoate-vilanteroL, 1 puff, inhalation, Daily  [START ON 5/24/2025] heparin, 5,000 Units, subcutaneous, q12h  montelukast, 10 mg, oral, Daily  perflutren lipid microspheres, 0.5-10 mL of dilution, intravenous, Once in imaging  perflutren protein A microsphere, 0.5 mL, intravenous, Once in imaging  potassium chloride CR, 20 mEq, oral, Daily  sulfur hexafluoride microsphr, 2 mL, intravenous, Once in imaging  torsemide, 100 mg, oral, Daily  ziprasidone, 40 mg, oral, Nightly     [2]    [3] PRN medications: albuterol, hydrOXYzine HCL, labetaloL, melatonin, ondansetron, oxygen, polyethylene glycol

## 2025-05-24 LAB
ANION GAP SERPL CALCULATED.3IONS-SCNC: 12 MMOL/L (ref 10–20)
BASOPHILS # BLD AUTO: 0.02 X10*3/UL (ref 0–0.1)
BASOPHILS NFR BLD AUTO: 0.4 %
BUN SERPL-MCNC: 17 MG/DL (ref 6–23)
CALCIUM SERPL-MCNC: 9 MG/DL (ref 8.6–10.3)
CHLORIDE SERPL-SCNC: 106 MMOL/L (ref 98–107)
CHOLEST SERPL-MCNC: 153 MG/DL (ref 0–199)
CHOLESTEROL/HDL RATIO: 2.8
CO2 SERPL-SCNC: 23 MMOL/L (ref 21–32)
CREAT SERPL-MCNC: 1.12 MG/DL (ref 0.5–1.05)
EGFRCR SERPLBLD CKD-EPI 2021: 57 ML/MIN/1.73M*2
EOSINOPHIL # BLD AUTO: 0 X10*3/UL (ref 0–0.7)
EOSINOPHIL NFR BLD AUTO: 0 %
ERYTHROCYTE [DISTWIDTH] IN BLOOD BY AUTOMATED COUNT: 14 % (ref 11.5–14.5)
EST. AVERAGE GLUCOSE BLD GHB EST-MCNC: 105 MG/DL
GLUCOSE BLD MANUAL STRIP-MCNC: 175 MG/DL (ref 74–99)
GLUCOSE BLD MANUAL STRIP-MCNC: 180 MG/DL (ref 74–99)
GLUCOSE BLD MANUAL STRIP-MCNC: 190 MG/DL (ref 74–99)
GLUCOSE BLD MANUAL STRIP-MCNC: 204 MG/DL (ref 74–99)
GLUCOSE SERPL-MCNC: 190 MG/DL (ref 74–99)
HBA1C MFR BLD: 5.3 % (ref ?–5.7)
HCT VFR BLD AUTO: 39.7 % (ref 36–46)
HDLC SERPL-MCNC: 55.2 MG/DL
HGB BLD-MCNC: 13.3 G/DL (ref 12–16)
IMM GRANULOCYTES # BLD AUTO: 0.02 X10*3/UL (ref 0–0.7)
IMM GRANULOCYTES NFR BLD AUTO: 0.4 % (ref 0–0.9)
LDLC SERPL CALC-MCNC: 86 MG/DL
LYMPHOCYTES # BLD AUTO: 0.59 X10*3/UL (ref 1.2–4.8)
LYMPHOCYTES NFR BLD AUTO: 13.2 %
MCH RBC QN AUTO: 31.9 PG (ref 26–34)
MCHC RBC AUTO-ENTMCNC: 33.5 G/DL (ref 32–36)
MCV RBC AUTO: 95 FL (ref 80–100)
MONOCYTES # BLD AUTO: 0.03 X10*3/UL (ref 0.1–1)
MONOCYTES NFR BLD AUTO: 0.7 %
NEUTROPHILS # BLD AUTO: 3.8 X10*3/UL (ref 1.2–7.7)
NEUTROPHILS NFR BLD AUTO: 85.3 %
NON HDL CHOLESTEROL: 98 MG/DL (ref 0–149)
NRBC BLD-RTO: 0 /100 WBCS (ref 0–0)
PLATELET # BLD AUTO: 162 X10*3/UL (ref 150–450)
POTASSIUM SERPL-SCNC: 4.2 MMOL/L (ref 3.5–5.3)
RBC # BLD AUTO: 4.17 X10*6/UL (ref 4–5.2)
SODIUM SERPL-SCNC: 137 MMOL/L (ref 136–145)
T4 FREE SERPL-MCNC: 0.84 NG/DL (ref 0.61–1.12)
TRIGL SERPL-MCNC: 58 MG/DL (ref 0–149)
TSH SERPL-ACNC: 0.42 MIU/L (ref 0.44–3.98)
VLDL: 12 MG/DL (ref 0–40)
WBC # BLD AUTO: 4.5 X10*3/UL (ref 4.4–11.3)

## 2025-05-24 PROCEDURE — 2500000001 HC RX 250 WO HCPCS SELF ADMINISTERED DRUGS (ALT 637 FOR MEDICARE OP): Performed by: INTERNAL MEDICINE

## 2025-05-24 PROCEDURE — 83036 HEMOGLOBIN GLYCOSYLATED A1C: CPT | Mod: TRILAB | Performed by: INTERNAL MEDICINE

## 2025-05-24 PROCEDURE — 96376 TX/PRO/DX INJ SAME DRUG ADON: CPT

## 2025-05-24 PROCEDURE — 97162 PT EVAL MOD COMPLEX 30 MIN: CPT | Mod: GP

## 2025-05-24 PROCEDURE — 2500000002 HC RX 250 W HCPCS SELF ADMINISTERED DRUGS (ALT 637 FOR MEDICARE OP, ALT 636 FOR OP/ED): Performed by: NURSE PRACTITIONER

## 2025-05-24 PROCEDURE — 94640 AIRWAY INHALATION TREATMENT: CPT

## 2025-05-24 PROCEDURE — 83718 ASSAY OF LIPOPROTEIN: CPT | Performed by: INTERNAL MEDICINE

## 2025-05-24 PROCEDURE — 96372 THER/PROPH/DIAG INJ SC/IM: CPT | Performed by: INTERNAL MEDICINE

## 2025-05-24 PROCEDURE — 2500000004 HC RX 250 GENERAL PHARMACY W/ HCPCS (ALT 636 FOR OP/ED): Mod: JZ | Performed by: NURSE PRACTITIONER

## 2025-05-24 PROCEDURE — 94664 DEMO&/EVAL PT USE INHALER: CPT | Mod: 59

## 2025-05-24 PROCEDURE — G0378 HOSPITAL OBSERVATION PER HR: HCPCS

## 2025-05-24 PROCEDURE — 84439 ASSAY OF FREE THYROXINE: CPT | Performed by: INTERNAL MEDICINE

## 2025-05-24 PROCEDURE — 9420000001 HC RT PATIENT EDUCATION 5 MIN

## 2025-05-24 PROCEDURE — 2500000004 HC RX 250 GENERAL PHARMACY W/ HCPCS (ALT 636 FOR OP/ED): Mod: JZ | Performed by: INTERNAL MEDICINE

## 2025-05-24 PROCEDURE — 85025 COMPLETE CBC W/AUTO DIFF WBC: CPT | Performed by: INTERNAL MEDICINE

## 2025-05-24 PROCEDURE — S4991 NICOTINE PATCH NONLEGEND: HCPCS | Performed by: NURSE PRACTITIONER

## 2025-05-24 PROCEDURE — 92523 SPEECH SOUND LANG COMPREHEN: CPT | Mod: GN

## 2025-05-24 PROCEDURE — 82947 ASSAY GLUCOSE BLOOD QUANT: CPT

## 2025-05-24 PROCEDURE — 84443 ASSAY THYROID STIM HORMONE: CPT | Performed by: INTERNAL MEDICINE

## 2025-05-24 PROCEDURE — 36415 COLL VENOUS BLD VENIPUNCTURE: CPT | Performed by: INTERNAL MEDICINE

## 2025-05-24 PROCEDURE — 99232 SBSQ HOSP IP/OBS MODERATE 35: CPT | Performed by: NURSE PRACTITIONER

## 2025-05-24 PROCEDURE — 96374 THER/PROPH/DIAG INJ IV PUSH: CPT | Mod: 59

## 2025-05-24 PROCEDURE — 80048 BASIC METABOLIC PNL TOTAL CA: CPT | Performed by: INTERNAL MEDICINE

## 2025-05-24 PROCEDURE — 96375 TX/PRO/DX INJ NEW DRUG ADDON: CPT

## 2025-05-24 PROCEDURE — 2500000002 HC RX 250 W HCPCS SELF ADMINISTERED DRUGS (ALT 637 FOR MEDICARE OP, ALT 636 FOR OP/ED): Performed by: INTERNAL MEDICINE

## 2025-05-24 PROCEDURE — 97165 OT EVAL LOW COMPLEX 30 MIN: CPT | Mod: GO

## 2025-05-24 RX ORDER — PROCHLORPERAZINE EDISYLATE 5 MG/ML
5 INJECTION INTRAMUSCULAR; INTRAVENOUS ONCE
Status: COMPLETED | OUTPATIENT
Start: 2025-05-24 | End: 2025-05-24

## 2025-05-24 RX ORDER — HYDRALAZINE HYDROCHLORIDE 20 MG/ML
5 INJECTION INTRAMUSCULAR; INTRAVENOUS ONCE
Status: COMPLETED | OUTPATIENT
Start: 2025-05-24 | End: 2025-05-24

## 2025-05-24 RX ORDER — LISINOPRIL 10 MG/1
10 TABLET ORAL DAILY
Status: DISCONTINUED | OUTPATIENT
Start: 2025-05-24 | End: 2025-05-25 | Stop reason: HOSPADM

## 2025-05-24 RX ORDER — DIPHENHYDRAMINE HYDROCHLORIDE 50 MG/ML
25 INJECTION, SOLUTION INTRAMUSCULAR; INTRAVENOUS ONCE
Status: COMPLETED | OUTPATIENT
Start: 2025-05-24 | End: 2025-05-24

## 2025-05-24 RX ORDER — IBUPROFEN 200 MG
1 TABLET ORAL DAILY
Status: DISCONTINUED | OUTPATIENT
Start: 2025-05-24 | End: 2025-05-25 | Stop reason: HOSPADM

## 2025-05-24 RX ADMIN — HEPARIN SODIUM 5000 UNITS: 5000 INJECTION, SOLUTION INTRAVENOUS; SUBCUTANEOUS at 08:51

## 2025-05-24 RX ADMIN — DEXAMETHASONE SODIUM PHOSPHATE 4 MG: 4 INJECTION, SOLUTION INTRAMUSCULAR; INTRAVENOUS at 23:22

## 2025-05-24 RX ADMIN — DIPHENHYDRAMINE HYDROCHLORIDE 25 MG: 50 INJECTION, SOLUTION INTRAMUSCULAR; INTRAVENOUS at 12:45

## 2025-05-24 RX ADMIN — HYDRALAZINE HYDROCHLORIDE 5 MG: 20 INJECTION INTRAMUSCULAR; INTRAVENOUS at 21:37

## 2025-05-24 RX ADMIN — BUSPIRONE HYDROCHLORIDE 15 MG: 5 TABLET ORAL at 21:37

## 2025-05-24 RX ADMIN — PROCHLORPERAZINE EDISYLATE 5 MG: 5 INJECTION INTRAMUSCULAR; INTRAVENOUS at 23:22

## 2025-05-24 RX ADMIN — DIPHENHYDRAMINE HYDROCHLORIDE 25 MG: 50 INJECTION, SOLUTION INTRAMUSCULAR; INTRAVENOUS at 23:22

## 2025-05-24 RX ADMIN — PROCHLORPERAZINE EDISYLATE 5 MG: 5 INJECTION INTRAMUSCULAR; INTRAVENOUS at 12:45

## 2025-05-24 RX ADMIN — NICOTINE 1 PATCH: 14 PATCH, EXTENDED RELEASE TRANSDERMAL at 12:51

## 2025-05-24 RX ADMIN — DEXAMETHASONE SODIUM PHOSPHATE 4 MG: 4 INJECTION, SOLUTION INTRAMUSCULAR; INTRAVENOUS at 12:45

## 2025-05-24 RX ADMIN — POTASSIUM CHLORIDE 20 MEQ: 1500 TABLET, EXTENDED RELEASE ORAL at 08:50

## 2025-05-24 RX ADMIN — ZIPRASIDONE HYDROCHLORIDE 40 MG: 40 CAPSULE ORAL at 21:37

## 2025-05-24 RX ADMIN — BUSPIRONE HYDROCHLORIDE 15 MG: 5 TABLET ORAL at 08:50

## 2025-05-24 RX ADMIN — HEPARIN SODIUM 5000 UNITS: 5000 INJECTION, SOLUTION INTRAVENOUS; SUBCUTANEOUS at 21:37

## 2025-05-24 RX ADMIN — FLUTICASONE FUROATE AND VILANTEROL TRIFENATATE 1 PUFF: 200; 25 POWDER RESPIRATORY (INHALATION) at 08:44

## 2025-05-24 RX ADMIN — LISINOPRIL 10 MG: 10 TABLET ORAL at 18:52

## 2025-05-24 ASSESSMENT — COGNITIVE AND FUNCTIONAL STATUS - GENERAL
STANDING UP FROM CHAIR USING ARMS: A LITTLE
TOILETING: A LITTLE
WALKING IN HOSPITAL ROOM: A LITTLE
DRESSING REGULAR UPPER BODY CLOTHING: A LITTLE
CLIMB 3 TO 5 STEPS WITH RAILING: A LITTLE
DAILY ACTIVITIY SCORE: 24
MOBILITY SCORE: 20
MOBILITY SCORE: 24
DAILY ACTIVITIY SCORE: 19
PERSONAL GROOMING: A LITTLE
DAILY ACTIVITIY SCORE: 24
MOVING TO AND FROM BED TO CHAIR: A LITTLE
MOBILITY SCORE: 24
HELP NEEDED FOR BATHING: A LITTLE
DRESSING REGULAR LOWER BODY CLOTHING: A LITTLE

## 2025-05-24 ASSESSMENT — PAIN - FUNCTIONAL ASSESSMENT
PAIN_FUNCTIONAL_ASSESSMENT: UNABLE TO SELF-REPORT
PAIN_FUNCTIONAL_ASSESSMENT: 0-10

## 2025-05-24 ASSESSMENT — PAIN SCALES - GENERAL
PAINLEVEL_OUTOF10: 2
PAINLEVEL_OUTOF10: 0 - NO PAIN
PAINLEVEL_OUTOF10: 5 - MODERATE PAIN
PAINLEVEL_OUTOF10: 5 - MODERATE PAIN
PAINLEVEL_OUTOF10: 2
PAINLEVEL_OUTOF10: 0 - NO PAIN

## 2025-05-24 ASSESSMENT — ACTIVITIES OF DAILY LIVING (ADL)
BATHING_ASSISTANCE: MINIMAL
LACK_OF_TRANSPORTATION: NO
ADL_ASSISTANCE: INDEPENDENT

## 2025-05-24 ASSESSMENT — PAIN DESCRIPTION - DESCRIPTORS: DESCRIPTORS: ACHING

## 2025-05-24 NOTE — PROGRESS NOTES
Spoke with patient regarding discharge planning.      Patient is A&OX3 from home with her wife.  Prior to admission patient was independent in all ADL's and IADL's, denies falls.     Discussed LOW level therapy recommendations, patient does not drive and is agreeble to home health care.     Preferences reviewed and discussed, referrals built and sent at this time.       UPDATE 1335:    [facility] has accepted patient for PT OT needs.     Will need EXTERNAL home health care referral placed prior to discharge        05/24/25 1154   Discharge Planning   Living Arrangements Spouse/significant other   Support Systems Spouse/significant other   Type of Residence Private residence   Number of Stairs to Enter Residence 5   Number of Stairs Within Residence 0   Do you have animals or pets at home? No   Who is requesting discharge planning? Provider   Home or Post Acute Services In home services   Type of Home Care Services Home PT;Home OT   Expected Discharge Disposition Home H   Does the patient need discharge transport arranged? No   Financial Resource Strain   How hard is it for you to pay for the very basics like food, housing, medical care, and heating? Not hard   Housing Stability   In the last 12 months, was there a time when you were not able to pay the mortgage or rent on time? N   In the past 12 months, how many times have you moved where you were living? 0   At any time in the past 12 months, were you homeless or living in a shelter (including now)? N   Transportation Needs   In the past 12 months, has lack of transportation kept you from medical appointments or from getting medications? no   In the past 12 months, has lack of transportation kept you from meetings, work, or from getting things needed for daily living? No   Patient Choice   Provider Choice list and CMS website (https://medicare.gov/care-compare#search) for post-acute Quality and Resource Measure Data were provided and reviewed with: Patient    Patient / Family choosing to utilize agency / facility established prior to hospitalization No   Stroke Family Assessment   Stroke Family Assessment Needed Yes   Physical Layout of Home One Story   Caregiver/Family can provide assistance with ADL's Yes   Caregiver/Family can provide mobility assistance for transfers in and out of bed, chair or car Yes   Medications: Caregiver/Family can obtain prescriptions Yes   Medications: Caregiver/Family can assist with medication administration Yes   Medications: Caregiver/Family verbalizes understanding of medications Yes   Caregiver/Family agrees with discharge plan to home Yes   Caregiver/Family verbalizes capability of caring for patient at home Yes

## 2025-05-24 NOTE — ASSESSMENT & PLAN NOTE
Patient follows with Dr. Valdes  Her Cr has improved today to 1.12  She underwent Kidney biopsy this past Wednesday

## 2025-05-24 NOTE — PROGRESS NOTES
Physical Therapy    Physical Therapy Evaluation    Patient Name: Celine Jhaveri  MRN: [id number]  Department: [address]  Room: [address] [place] [address]  Today's Date: 5/24/2025   Time Calculation  Start Time: 0921  Stop Time: 0932  Time Calculation (min): 11 min    Assessment/Plan   PT Assessment  PT Assessment Results: Decreased strength, Decreased endurance, Impaired balance, Decreased mobility, Decreased coordination, Decreased safety awareness  Rehab Prognosis: Good  Barriers to Discharge Home: No anticipated barriers  Evaluation/Treatment Tolerance: Patient tolerated treatment well  Medical Staff Made Aware: Yes  Strengths: Attitude of self, Capable of completing ADLs semi/independent, Housing layout, Living arrangement secure, Premorbid level of function, Support of Caregivers  Barriers to Participation: Comorbidities  End of Session Communication: Bedside nurse  Assessment Comment: 60 y.o. patient admitted from ED with complaints of a headache, high blood pressure and decreased sensation in Left UE and LE along with left facial numbness. Records indicate CVA protocol followed and CT of brain found no evidence of acute cortical infarct or intracranial hemorrhage (per MD). CT angio head and neck was also obtained in the ED- No evidence of large vessel occlusion or significant stenosis in the  neck or intracranially (per MD note). At eval patient presents with weakness and impaired mobility warranting skilled PT care. At d/c low intensity rehab is recommended.  End of Session Patient Position: Bed, 3 rail up, Alarm on  IP OR SWING BED PT PLAN  Inpatient or Swing Bed: Inpatient  PT Plan  Treatment/Interventions: Transfer training, Gait training, Stair training, Balance training, Neuromuscular re-education, Strengthening, Endurance training, Therapeutic exercise, Therapeutic activity, Home exercise program  PT Plan: Ongoing PT  PT Frequency: 3 times per week  PT Discharge Recommendations: Low intensity level of continued  care  Equipment Recommended upon Discharge: Wheeled walker  PT Recommended Transfer Status: Stand by assist, Assistive device  PT - OK to Discharge: Yes    Subjective     PT Visit Info:  PT Received On: 05/24/25  General Visit Information:  General  Reason for Referral: impaired mobility, decreased sensation in Left UE and LE along with left facial numbness  Referred By: Dr. Barone  Past Medical History Relevant to Rehab:  Anxiety and depression     Bipolar 1 disorder (Multi)     Chronic kidney disease     COPD (chronic obstructive pulmonary disease) (Multi)     HTN (hypertension)     LINDA (obstructive sleep apnea)  Family/Caregiver Present: No  Co-Treatment: OT  Co-Treatment Reason: safe mobility techniques  Prior to Session Communication: Bedside nurse  Patient Position Received: Bed, 3 rail up, Alarm on  Preferred Learning Style: verbal, visual  General Comment: 60 y.o. patient admitted from ED with complaints of a headache, high blood pressure and decreased sensation in Left UE and LE along with left facial numbness.  Home Living:  Home Living  Type of Home: House  Lives With: Spouse (lives with wife)  Home Adaptive Equipment: None  Home Layout: One level  Home Access: Stairs to enter with rails  Entrance Stairs-Rails: Both  Entrance Stairs-Number of Steps: 5  Bathroom Shower/Tub: Tub only  Prior Level of Function:  Prior Function Per Pt/Caregiver Report  Level of [place]: Independent with ADLs and functional transfers, Independent with homemaking with ambulation  Ambulatory Assistance: Independent  Precautions:  Precautions  Medical Precautions: Fall precautions  Precautions Comment: recent numbness noted in left LE and UE, glasses      Date/Time Vitals Session Patient Position Pulse Resp SpO2 BP MAP (mmHg)    05/24/25 0844 --  --  --  --  94 %  --  --                 Objective   Pain:  Pain Assessment  Pain Assessment: 0-10  0-10 (Numeric) Pain Score: 5 - Moderate pain  Pain Type: Acute pain  Pain Location:  Head  Pain Interventions: Ambulation/increased activity, Repositioned  Response to Interventions: No change in pain (nurse leilani)  Cognition:  Cognition  Overall Cognitive Status: Within Functional Limits  Orientation Level: Oriented X4  Safety/Judgement: Within Functional Limits (slightly impulsive)  Impulsive: Mildly    General Assessments:       Activity Tolerance  Endurance: Decreased tolerance for upright activites    Sensation  Light Touch: Partial deficits in the LLE, Partial deficits in the LUE  Sensation Comment: L LE diminished sensation from thight to distal foot    Strength  Strength Comments: MMT seated grossly 4+/5 on R LE and 4-/5 on L LE       Static Sitting Balance  Static Sitting-Balance Support: Feet supported  Static Sitting-Level of Assistance: Independent  Static Sitting-Comment/Number of Minutes: 4 minutes    Static Standing Balance  Static Standing-Balance Support: No upper extremity supported  Static Standing-Level of Assistance: Close supervision  Static Standing-Comment/Number of Minutes: 2 minutes  Functional Assessments:  Bed Mobility  Bed Mobility: Yes  Bed Mobility 1  Bed Mobility 1: Supine to sitting, Sitting to supine  Level of Assistance 1: Independent    Transfers  Transfer: Yes  Transfer 1  Transfer From 1: Sit to  Transfer to 1: Stand  Technique 1: Sit to stand, Stand to sit  Transfer Level of Assistance 1: Close supervision  Transfers 2  Transfer From 2: Stand to  Transfer to 2: Commode-standard  Technique 2: Sit to stand, Stand to sit  Transfer Device 2: Walker  Transfer Level of Assistance 2: Close supervision    Ambulation/Gait Training  Ambulation/Gait Training Performed: Yes  Ambulation/Gait Training 1  Surface 1: Level tile  Device 1: Rolling walker  Assistance 1: Close supervision  Quality of Gait 1: Diminished heel strike, Decreased step length, Shuffling gait, Narrow base of support  Comments/Distance (ft) 1: 25 feet with rolling walker  Ambulation/Gait Training  2  Surface 2: Level tile  Device 2: No device  Assistance 2: Contact guard  Quality of Gait 2: Narrow base of support, Diminished heel strike, Inconsistent stride length, Decreased step length  Comments/Distance (ft) 2: 15 feet x 2 without assistive device, increased shuffling gait pattern with short step length, required CGA without assistive device due to increased unsteadiness     Outcome Measures:  [facility] Basic Mobility  Turning from your back to your side while in a flat bed without using bedrails: None  Moving from lying on your back to sitting on the side of a flat bed without using bedrails: None  Moving to and from bed to chair (including a wheelchair): A little  Standing up from a chair using your arms (e.g. wheelchair or bedside chair): A little  To walk in hospital room: A little  Climbing 3-5 steps with railing: A little  Basic Mobility - Total Score: 20    Encounter Problems       Encounter Problems (Active)       Balance       Dynamic Balance (Progressing)       Start:  05/24/25    Expected End:  06/07/25       Patient will maintains dynamic standing balance without upper extremity support while performing ADLs independently.             Mobility       Ambulation (Progressing)       Start:  05/24/25    Expected End:  06/07/25       Patient will ambulate 150 feet, with least restrictive device with supervision.         Stairs       Start:  05/24/25    Expected End:  06/07/25       Patient will ascend and descend four steps, with single hand rail support, with supervision.                Education Documentation  Mobility Training, taught by Kelly Degroot PT at 5/24/2025 10:19 AM.  Learner: Patient  Readiness: Acceptance  Method: Explanation, Demonstration  Response: Verbalizes Understanding, Needs Reinforcement  Comment: Educated on safe mobility techniques    Body Mechanics, taught by Kelly Degroot PT at 5/24/2025 10:19 AM.  Learner: Patient  Readiness: Acceptance  Method: Explanation,  Demonstration  Response: Verbalizes Understanding, Needs Reinforcement  Comment: Educated on safe mobility techniques    ADL Training, taught by Kelly Degroot PT at 5/24/2025 10:19 AM.  Learner: Patient  Readiness: Acceptance  Method: Explanation, Demonstration  Response: Verbalizes Understanding, Needs Reinforcement  Comment: Educated on safe mobility techniques    Education Comments  No comments found.

## 2025-05-24 NOTE — PROGRESS NOTES
Occupational Therapy    Evaluation    Patient Name: Celine Jhaveri  MRN: [id number]  Department: [address]  Room: [facility]  Today's Date: 5/24/2025  Time Calculation  Start Time: 0921  Stop Time: 0932  Time Calculation (min): 11 min        Assessment:  OT Assessment: pt demonstrates difficulty with ADLs, functional mobility, decreased balance, decreased endurance, and generalized weakness. pt would benefit from skilled OT to facilitate PLOF and promote safety.  Prognosis: Good  Barriers to Discharge Home: No anticipated barriers  Evaluation/Treatment Tolerance: Patient tolerated treatment well  Medical Staff Made Aware: Yes  End of Session Communication: Bedside nurse  End of Session Patient Position: Bed, 3 rail up, Alarm on (all needs in reach. RN present)  OT Assessment Results: Decreased ADL status, Decreased endurance, Decreased functional mobility, Decreased IADLs  Prognosis: Good  Evaluation/Treatment Tolerance: Patient tolerated treatment well  Medical Staff Made Aware: Yes  Strengths: Ability to acquire knowledge, Attitude of self  Barriers to Participation: Comorbidities  Plan:  Treatment Interventions: ADL retraining, Functional transfer training, UE strengthening/ROM, Endurance training, Equipment evaluation/education, Compensatory technique education  OT Frequency: 3 times per week  OT Discharge Recommendations: Low intensity level of continued care  Equipment Recommended upon Discharge: Wheeled walker (tub transfer bench)  OT Recommended Transfer Status: Assist of 1  OT - OK to Discharge: Yes  Treatment Interventions: ADL retraining, Functional transfer training, UE strengthening/ROM, Endurance training, Equipment evaluation/education, Compensatory technique education    Subjective   Current Problem:  1. Left sided numbness        2. Acute nonintractable headache, unspecified headache type        3. Transient cerebral ischemia, unspecified type  Transthoracic Echo Complete    Transthoracic Echo Complete         OT Visit Info:  OT Received On: 05/24/25  General:  General  Reason for Referral: impaired ADLs; admitted with decreased sensation in LUE and LLE, L facial numbness; TIA  Referred By: Dr. Barone  Past Medical History Relevant to Rehab:  Anxiety and depression     Bipolar 1 disorder (Multi)     Chronic kidney disease     COPD (chronic obstructive pulmonary disease) (Multi)     HTN (hypertension)     LINDA (obstructive sleep apnea)  Family/Caregiver Present: No  Co-Treatment: PT  Co-Treatment Reason: maximize pt safety and therapeutic potential  Prior to Session Communication: Bedside nurse  Patient Position Received: Bed, 3 rail up, Alarm on  General Comment: cleared by nursing. pt pleasant and agreeable to therapy  Precautions:  Medical Precautions: Fall precautions    Pain:  Pain Assessment  Pain Assessment: 0-10  0-10 (Numeric) Pain Score: 5 - Moderate pain  Pain Type: Acute pain  Pain Location: Head  Pain Interventions: Repositioned (RN notified)  Response to Interventions: No change in pain, Content/relaxed    Objective   Cognition:  Overall Cognitive Status: Within Functional Limits  Orientation Level: Oriented X4  Following Commands: Follows all commands and directions without difficulty  Insight: Mild  Impulsive: Mildly           Home Living:  Type of Home: House  Lives With: Spouse (wife)  Home Adaptive Equipment: None  Home Layout: One level  Home Access: Stairs to enter with rails  Entrance Stairs-Rails: Both  Entrance Stairs-Number of Steps: 5  Bathroom Shower/Tub: Tub only  Home Living Comments: no concerns  Prior Function:  Level of [place]: Independent with ADLs and functional transfers, Independent with homemaking with ambulation  Receives Help From: Family  ADL Assistance: Independent  Homemaking Assistance: Independent  Ambulatory Assistance: Independent  Hand Dominance: Right  Prior Function Comments: + drives    ADL:  Eating Assistance: Independent  Grooming Assistance: Stand by  (anticipated)  Bathing Assistance: Minimal (anticipated)  UE Dressing Assistance: Stand by (anticipated)  LE Dressing Assistance: Stand by (SBA to pull down/up pants during toileting)  Toileting Assistance with Device: Stand by (SBA to perform devonte care standing at toilet)  Activity Tolerance:  Endurance: Decreased tolerance for upright activites  Bed Mobility/Transfers: Bed Mobility  Bed Mobility: Yes  Bed Mobility 1  Bed Mobility 1: Supine to sitting, Sitting to supine  Level of Assistance 1: Close supervision  Bed Mobility Comments 1: HOB elevated and use of bed rails.    Transfers  Transfer: Yes  Transfer 1  Technique 1: Sit to stand, Stand to sit  Transfer Device 1: Walker  Transfer Level of Assistance 1: Close supervision  Trials/Comments 1: bed and toilet; supervision with use of FWW    Functional Mobility:  Functional Mobility  Functional Mobility Performed: Yes  Functional Mobility 1  Surface 1: Level tile  Device 1: Rolling walker  Assistance 1: Contact guard  Comments 1: pt performed functional mobility short household distance, CGA FWW. pt appeared to be unsteady without use of FWW. min verbal cues for safety and sequencing  Sitting Balance:  Static Sitting Balance  Static Sitting-Balance Support: Feet supported  Static Sitting-Level of Assistance: Independent  Standing Balance:  Static Standing Balance  Static Standing-Balance Support: Bilateral upper extremity supported  Static Standing-Level of Assistance: Close supervision     Vision:Vision - Basic Assessment  Current Vision: Wears glasses all the time  Sensation:  Sensation Comment: pt reports diminished sensation LUE, LLE, and L side of face  Strength:  Strength Comments: LUE 3+/5    Coordination:  Movements are Fluid and Coordinated: Yes   Hand Function:  Gross Grasp: Functional  Coordination: Functional  Extremities: RUE   RUE : Within Functional Limits and LUE   LUE: Within Functional Limits    Outcome Measures:[facility] Daily Activity  Putting on  and taking off regular lower body clothing: A little  Bathing (including washing, rinsing, drying): A little  Putting on and taking off regular upper body clothing: A little  Toileting, which includes using toilet, bedpan or urinal: A little  Taking care of personal grooming such as brushing teeth: A little  Eating Meals: None  Daily Activity - Total Score: 19    Education Documentation  Body Mechanics, taught by Caryn Longoria OT at 5/24/2025 10:13 AM.  Learner: Patient  Readiness: Acceptance  Method: Explanation  Response: Verbalizes Understanding  Comment: Educated importance OOB activity, safety precautions, OT POC    ADL Training, taught by Caryn Longoria OT at 5/24/2025 10:13 AM.  Learner: Patient  Readiness: Acceptance  Method: Explanation  Response: Verbalizes Understanding  Comment: Educated importance OOB activity, safety precautions, OT POC    Education Comments  No comments found.      Goals:  Encounter Problems       Encounter Problems (Active)       ADLs       Patient will perform UB and LB bathing with independent level of assistance.       Start:  05/24/25    Expected End:  06/14/25            Patient with complete upper body dressing with independent level of assistance donning and doffing all UE clothes       Start:  05/24/25    Expected End:  06/14/25            Patient with complete lower body dressing with independent level of assistance donning and doffing all LE clothes       Start:  05/24/25    Expected End:  06/14/25            Patient will complete daily grooming tasks with independent level of assistance.       Start:  05/24/25    Expected End:  06/14/25            Patient will complete toileting including hygiene clothing management/hygiene with independent level of assistance.       Start:  05/24/25    Expected End:  06/14/25               MOBILITY       Patient will perform Functional mobility max Household distances/Community Distances with modified independent level of assistance and least  restrictive device in order to improve safety and functional mobility.       Start:  05/24/25    Expected End:  06/14/25

## 2025-05-24 NOTE — PROGRESS NOTES
Celine Jhaveri is a 60 y.o. female on day 0 of admission presenting with TIA (transient ischemic attack).      Subjective   Patient seen and examined. Patient continues to report HA and left side numbness and tingling. Patient recently started on Nurtec for migraine  but she feels it does not work. No other c/o.        Objective     Last Recorded Vitals  BP (!) 147/98 (BP Location: Right arm, Patient Position: Sitting) Comment: edward notified  Pulse 98   Temp 37 °C (98.6 °F) (Temporal)   Resp 18   Wt 95.3 kg (210 lb)   SpO2 100%   Intake/Output last 3 Shifts:    Intake/Output Summary (Last 24 hours) at 5/24/2025 1259  Last data filed at 5/24/2025 1122  Gross per 24 hour   Intake 1026.67 ml   Output 650 ml   Net 376.67 ml       Admission Weight  Weight: 95.3 kg (210 lb) (05/23/25 1546)    Daily Weight  05/23/25 : 95.3 kg (210 lb)    Image Results  CT brain attack angio head and neck W and WO IV contrast  Narrative: Interpreted By:  Neftaly Johnson,   STUDY:  CT BRAIN ATTACK ANGIO HEAD AND NECK W AND WO IV CONTRAST;  5/23/2025  4:43 pm      INDICATION:  Signs/Symptoms:Headache, left-sided numbness.          COMPARISON:  Same day noncontrast CT head.      ACCESSION NUMBER(S):  [id number]      ORDERING CLINICIAN:  RADHA HURTADO      TECHNIQUE:  CT angiogram of head and neck was obtained after administration of  intravenous contrast with MIP, coronal sagittal reformats on separate  workstation. 3D volume rendering of head and neck vessels obtained on  a separate workstation.      FINDINGS:  Anterior circulation: Bilateral common carotid and internal carotid  arteries are widely patent without focal stenosis. Trace  atherosclerotic calcifications of the right carotid bifurcation. 0%  stenosis by NASCET criteria. Scattered atherosclerotic calcifications  of bilateral carotid siphons without stenosis. Proximal anterior  cerebral and middle cerebral arteries are widely patent without focal  stenosis.      Posterior  circulation: Bilateral vertebral arteries are patent  without focal stenosis. Basilar artery is patent without focal  stenosis. Proximal posterior cerebral arteries are widely patent  without focal stenosis.      8 mm right intraparotid nodule which may represent an intraparotid  nodule versus small adenoma. 1.4 cm right thyroid nodule. There is  paraseptal emphysema. Lung apices are clear.      Impression: No evidence of large vessel occlusion or significant stenosis in the  neck or intracranially. Incidental note made of 0.8 cm right parotid  nodule which may represent an intraparotid lymph node or benign tumor  such as pleomorphic adenoma. 1.3 cm right thyroid nodule. Consider  follow-up thyroid ultrasound.      MACRO:  None      Signed by: Neftaly Johnson 5/23/2025 4:55 PM  Dictation workstation:   [id number]  CT brain attack head wo IV contrast  Narrative: Interpreted By:  Payam Brower,   STUDY:  CT BRAIN ATTACK HEAD WO IV CONTRAST;  5/23/2025 4:17 pm      INDICATION:  Signs/Symptoms:Stroke Evaluation.          COMPARISON:  12/06/2022      ACCESSION NUMBER(S):  [id number]      ORDERING CLINICIAN:  RADHA HURTADO      TECHNIQUE:  Noncontrast axial CT scan of head was performed. Angled reformats in  brain and bone windows were generated. The images were reviewed in  bone, brain, blood and soft tissue windows.      FINDINGS:  CSF Spaces: The ventricles, sulci and basal cisterns are within  normal limits. There is no extraaxial fluid collection.      Parenchyma:  The grey-white differentiation is intact. There is no  mass effect or midline shift.  There is no intracranial hemorrhage.      Calvarium: The calvarium is unremarkable.      Paranasal sinuses and mastoids: Visualized paranasal sinuses and  mastoids are remarkable for mucosal thickening of the right maxillary  sinus.      Impression: No evidence of acute cortical infarct or intracranial hemorrhage.              MACRO:  Payam Brower discussed the  significance and urgency of this critical  finding by secure chat with  RADHA HURTADO on 5/23/2025 at 4:30 pm.  (**-RCF-**) Findings:  See findings.          Signed by: Payam Alvaradochristina 5/23/2025 4:31 PM  Dictation workstation:   [id number]      Physical Exam  Vitals reviewed.   Constitutional:       Appearance: She is obese.   HENT:      Head: Normocephalic.      Nose: Nose normal.      Mouth/Throat:      Mouth: Mucous membranes are moist.   Eyes:      Extraocular Movements: Extraocular movements intact.   Cardiovascular:      Rate and Rhythm: Regular rhythm.   Pulmonary:      Effort: Pulmonary effort is normal.   Abdominal:      General: Bowel sounds are normal.   Musculoskeletal:         General: Normal range of motion.      Cervical back: Neck supple.   Skin:     General: Skin is warm.   Neurological:      Mental Status: She is alert and oriented to person, place, and time.         Relevant Results             CT brain attack angio head and neck W and WO IV contrast  Result Date: 5/23/2025  Interpreted By:  Neftaly Johnson, STUDY: CT BRAIN ATTACK ANGIO HEAD AND NECK W AND WO IV CONTRAST;  5/23/2025 4:43 pm   INDICATION: Signs/Symptoms:Headache, left-sided numbness.     COMPARISON: Same day noncontrast CT head.   ACCESSION NUMBER(S): [id number]   ORDERING CLINICIAN: RADHA HURTADO   TECHNIQUE: CT angiogram of head and neck was obtained after administration of intravenous contrast with MIP, coronal sagittal reformats on separate workstation. 3D volume rendering of head and neck vessels obtained on a separate workstation.   FINDINGS: Anterior circulation: Bilateral common carotid and internal carotid arteries are widely patent without focal stenosis. Trace atherosclerotic calcifications of the right carotid bifurcation. 0% stenosis by NASCET criteria. Scattered atherosclerotic calcifications of bilateral carotid siphons without stenosis. Proximal anterior cerebral and middle cerebral arteries are widely patent without  focal stenosis.   Posterior circulation: Bilateral vertebral arteries are patent without focal stenosis. Basilar artery is patent without focal stenosis. Proximal posterior cerebral arteries are widely patent without focal stenosis.   8 mm right intraparotid nodule which may represent an intraparotid nodule versus small adenoma. 1.4 cm right thyroid nodule. There is paraseptal emphysema. Lung apices are clear.       No evidence of large vessel occlusion or significant stenosis in the neck or intracranially. Incidental note made of 0.8 cm right parotid nodule which may represent an intraparotid lymph node or benign tumor such as pleomorphic adenoma. 1.3 cm right thyroid nodule. Consider follow-up thyroid ultrasound.   MACRO: None   Signed by: Neftaly Johnson 5/23/2025 4:55 PM Dictation workstation:   [id number]    CT brain attack head wo IV contrast  Result Date: 5/23/2025  Interpreted By:  Payam Brower, STUDY: CT BRAIN ATTACK HEAD WO IV CONTRAST;  5/23/2025 4:17 pm   INDICATION: Signs/Symptoms:Stroke Evaluation.     COMPARISON: 12/06/2022   ACCESSION NUMBER(S): [id number]   ORDERING CLINICIAN: RADHA HURTADO   TECHNIQUE: Noncontrast axial CT scan of head was performed. Angled reformats in brain and bone windows were generated. The images were reviewed in bone, brain, blood and soft tissue windows.   FINDINGS: CSF Spaces: The ventricles, sulci and basal cisterns are within normal limits. There is no extraaxial fluid collection.   Parenchyma:  The grey-white differentiation is intact. There is no mass effect or midline shift.  There is no intracranial hemorrhage.   Calvarium: The calvarium is unremarkable.   Paranasal sinuses and mastoids: Visualized paranasal sinuses and mastoids are remarkable for mucosal thickening of the right maxillary sinus.       No evidence of acute cortical infarct or intracranial hemorrhage.       MACRO: Payam Brower discussed the significance and urgency of this critical finding by  secure chat with  RADHA HURTADO on 5/23/2025 at 4:30 pm. (**-RCF-**) Findings:  See findings.     Signed by: Payam Brower 5/23/2025 4:31 PM Dictation workstation:   [id number]    CT guided renal left percutaneous biopsy  Result Date: 5/21/2025  Interpreted By:  Elmer Chavis, STUDY: CT GUIDED RENAL LEFT PERCUTANEOUS BIOPSY; 5/21/2025 12:54 pm   INDICATION: Acute kidney failure, unspecified.   COMPARISON: None   ACCESSION NUMBER(S): [id number]   ORDERING CLINICIAN: JOAN ALMARAZ   TECHNIQUE: Conscious sedation: 25 minutes CT-guided kidney biopsy   FINDINGS: Informed consent obtained. Patient positionedprone and connected to physiologic monitoring. Conscious sedation provided with Versed and fentanyl. Initial CT images obtained with localizing grid on each flank and lower pole of the right kidney selected for biopsy. Skin prepped, draped and anesthetized. Under CT guidance, a 17-gauge trocar cannula was advanced into lower pole kidney. An 18-gauge biopsy instrument advanced coaxially and 3 core biopsies obtained. Specimen evaluated by pathologist and deemed adequate. Post-biopsy CT images demonstrated small perinephric hematoma. Gel-Foam slurry was infused through the introducer cannula to aid hemostasis. Patient tolerated procedure well and in stable condition transferred to recovery area.       CT-guided renal cortical biopsy.   Signed by: Elmer Chavis 5/21/2025 5:45 PM Dictation workstation:   [id number]    CT abdomen pelvis wo IV contrast  Result Date: 5/6/2025  Interpreted By:  Robert Ureña, STUDY: CT ABDOMEN PELVIS WO IV CONTRAST;  5/6/2025 3:31 pm   INDICATION: Signs/Symptoms:CKD stage III.  Diarrhea.  Vomiting.  Bilateral flank pain.     COMPARISON: 08/21/2024   ACCESSION NUMBER(S): [id number]   ORDERING CLINICIAN: PHUONG MARTINS   TECHNIQUE: Contiguous axial images of the abdomen and pelvis were obtained without intravenous contrast. Coronal and sagittal reformatted images were reconstructed from the  axial data.   FINDINGS: Note: The absence of intravenous contrast limits evaluation of the solid organs and vasculature.   LOWER CHEST: No acute abnormality. Emphysema. Mild reticulation in the subpleural regions bilaterally (right > left). A few unchanged prominent lower paraesophageal lymph nodes are again noted.     ABDOMEN/PELVIS:   ABDOMINAL WALL: There is a wide neck fat containing ventral hernia measuring 4.8 cm in diameter. Are also smaller neck supraumbilical hernias above this within which there is a small portion of the colonic wall   There is scarring about the umbilicus likely from prior surgery. Additionally, there is surgical mesh adjacent to the left inguinal ring.   LIVER: The liver demonstrates a normal noncontrast attenuation.   BILE DUCTS: No significant intrahepatic or extrahepatic dilatation.   GALLBLADDER: Surgically absent.   PANCREAS: No significant abnormality.   SPLEEN: No significant abnormality.   ADRENALS: No significant abnormality.   KIDNEYS, URETERS, BLADDER: There is a simple left renal cyst measuring 4.4 cm. No nephroureterolithiasis or hydroureteronephrosis. The bladder wall is normal thickness for degree of distention.   REPRODUCTIVE ORGANS: Surgically absent uterus.   VESSELS: There is an enlarged coronary vein. Moderate aortic atherosclerosis without AAA. 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   RETROPERITONEUM/LYMPH NODES: No acute retroperitoneal abnormality. No enlarged lymph nodes.   BOWEL/PERITONEUM: Slight protrusion of the anterior wall of the colon into ventral supraumbilical hernia without evidence of obstruction or strangulation. No inflammatory bowel wall thickening or dilatation. Normal appendix.   No ascites, free air, or fluid collection.     MUSCULOSKELETAL: No acute osseous abnormality. Spinal stimulator leads are noted. No suspicious osseous lesion.       No acute process in the abdomen or pelvis.   Redemonstrated  ventral hernias with slight protrusion of the anterior wall of the transverse colon to the defects not resulting obstruction or strangulation.   Emphysema the lung bases.   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   MACRO: None.   Signed by: Robert Ureña 5/6/2025 4:06 PM Dictation workstation:   [id number]          Results for orders placed or performed during the hospital encounter of 05/23/25 (from the past 24 hours)   CBC and Auto Differential   Result Value Ref Range    WBC 6.2 4.4 - 11.3 x10*3/uL    nRBC 0.0 0.0 - 0.0 /100 WBCs    RBC 4.21 4.00 - 5.20 x10*6/uL    Hemoglobin 13.3 12.0 - 16.0 g/dL    Hematocrit 39.9 36.0 - 46.0 %    MCV 95 80 - 100 fL    MCH 31.6 26.0 - 34.0 pg    MCHC 33.3 32.0 - 36.0 g/dL    RDW 13.8 11.5 - 14.5 %    Platelets 167 150 - 450 x10*3/uL    Neutrophils % 45.3 40.0 - 80.0 %    Immature Granulocytes %, Automated 0.2 0.0 - 0.9 %    Lymphocytes % 40.0 13.0 - 44.0 %    Monocytes % 9.1 2.0 - 10.0 %    Eosinophils % 4.0 0.0 - 6.0 %    Basophils % 1.4 0.0 - 2.0 %    Neutrophils Absolute 2.82 1.20 - 7.70 x10*3/uL    Immature Granulocytes Absolute, Automated 0.01 0.00 - 0.70 x10*3/uL    Lymphocytes Absolute 2.49 1.20 - 4.80 x10*3/uL    Monocytes Absolute 0.57 0.10 - 1.00 x10*3/uL    Eosinophils Absolute 0.25 0.00 - 0.70 x10*3/uL    Basophils Absolute 0.09 0.00 - 0.10 x10*3/uL   Comprehensive metabolic panel   Result Value Ref Range    Glucose 81 74 - 99 mg/dL    Sodium 138 136 - 145 mmol/L    Potassium 4.0 3.5 - 5.3 mmol/L    Chloride 104 98 - 107 mmol/L    Bicarbonate 27 21 - 32 mmol/L    Anion Gap 11 10 - 20 mmol/L    Urea Nitrogen 15 6 - 23 mg/dL    Creatinine 1.12 (H) 0.50 - 1.05 mg/dL    eGFR 57 (L) >60 mL/min/1.73m*2    Calcium 9.1 8.6 - 10.3 mg/dL    Albumin 3.7 3.4 - 5.0 g/dL    Alkaline Phosphatase 88 33 - 110 U/L    Total Protein 7.0 6.4 - 8.2 g/dL    AST 22 9 - 39 U/L    Bilirubin, Total 0.7 0.0 - 1.2 mg/dL    ALT 17 7 - 45 U/L    Troponin I, High Sensitivity   Result Value Ref Range    Troponin I, High Sensitivity 6 0 - 13 ng/L   Protime-INR   Result Value Ref Range    Protime 10.8 9.3 - 12.7 seconds    INR 1.0 0.9 - 1.2   APTT   Result Value Ref Range    aPTT 28.8 22.0 - 32.5 seconds   POCT GLUCOSE   Result Value Ref Range    POCT Glucose 89 74 - 99 mg/dL   POCT glucose   Result Value Ref Range    POCT Glucose 89 74 - 99 mg/dL   POCT GLUCOSE   Result Value Ref Range    POCT Glucose 98 74 - 99 mg/dL   Lipid panel   Result Value Ref Range    Cholesterol 153 0 - 199 mg/dL    HDL-Cholesterol 55.2 mg/dL    Cholesterol/HDL Ratio 2.8     LDL Calculated 86 <=99 mg/dL    VLDL 12 0 - 40 mg/dL    Triglycerides 58 0 - 149 mg/dL    Non HDL Cholesterol 98 0 - 149 mg/dL   CBC and Auto Differential   Result Value Ref Range    WBC 4.5 4.4 - 11.3 x10*3/uL    nRBC 0.0 0.0 - 0.0 /100 WBCs    RBC 4.17 4.00 - 5.20 x10*6/uL    Hemoglobin 13.3 12.0 - 16.0 g/dL    Hematocrit 39.7 36.0 - 46.0 %    MCV 95 80 - 100 fL    MCH 31.9 26.0 - 34.0 pg    MCHC 33.5 32.0 - 36.0 g/dL    RDW 14.0 11.5 - 14.5 %    Platelets 162 150 - 450 x10*3/uL    Neutrophils % 85.3 40.0 - 80.0 %    Immature Granulocytes %, Automated 0.4 0.0 - 0.9 %    Lymphocytes % 13.2 13.0 - 44.0 %    Monocytes % 0.7 2.0 - 10.0 %    Eosinophils % 0.0 0.0 - 6.0 %    Basophils % 0.4 0.0 - 2.0 %    Neutrophils Absolute 3.80 1.20 - 7.70 x10*3/uL    Immature Granulocytes Absolute, Automated 0.02 0.00 - 0.70 x10*3/uL    Lymphocytes Absolute 0.59 (L) 1.20 - 4.80 x10*3/uL    Monocytes Absolute 0.03 (L) 0.10 - 1.00 x10*3/uL    Eosinophils Absolute 0.00 0.00 - 0.70 x10*3/uL    Basophils Absolute 0.02 0.00 - 0.10 x10*3/uL   Basic metabolic panel   Result Value Ref Range    Glucose 190 (H) 74 - 99 mg/dL    Sodium 137 136 - 145 mmol/L    Potassium 4.2 3.5 - 5.3 mmol/L    Chloride 106 98 - 107 mmol/L    Bicarbonate 23 21 - 32 mmol/L    Anion Gap 12 10 - 20 mmol/L    Urea Nitrogen 17 6 - 23 mg/dL    Creatinine 1.12 (H)  0.50 - 1.05 mg/dL    eGFR 57 (L) >60 mL/min/1.73m*2    Calcium 9.0 8.6 - 10.3 mg/dL   TSH with reflex to Free T4 if abnormal   Result Value Ref Range    Thyroid Stimulating Hormone 0.42 (L) 0.44 - 3.98 mIU/L   Thyroxine, Free   Result Value Ref Range    Thyroxine, Free 0.84 0.61 - 1.12 ng/dL   POCT GLUCOSE   Result Value Ref Range    POCT Glucose 190 (H) 74 - 99 mg/dL   POCT GLUCOSE   Result Value Ref Range    POCT Glucose 180 (H) 74 - 99 mg/dL       Scheduled medications  Scheduled Medications[1]  Continuous medications  Continuous Medications[2]  PRN medications  PRN Medications[3]   Assessment & Plan  TIA (transient ischemic attack)  Admitted to [facility] unit  Continuous telemetry pulse oximetry monitoring  Neurology consultation appreciate recs  CT brain without contrast appreciated as well as CT Angio Head and Neck  Obtain TTE with bubble  Defer Carotid Arterial Doppler Ultrasound to Neurology   MRI head w/o IV Contrast on hold as the patient has a Spinal Cord Stimulator (BS) for past 15 years. Defer MRI/MRA to Neurology considering we have a CT Angio Head/Neck already on-file if MRI is compatible. Patient was instructed that SCS is non-MRI compatible in the past.  Speech and Swallow Evaluation   Permissive HTN Management  PT/OT/Rehabilitation Consult   Start ASA per Neurology. Patient had kidney biopsy this past Wednesday./Continue home statin  Neuro checks Q shift  Up with Assist   Aspiration Precautions   AM HgbA1c and Lipid Profile    Acute headache  Patient takes Nurtec at home  Neurology consult  Numbness and tingling of left arm and leg  Management as above    Pleomorphic adenoma of parotid gland  Incidental finding  Will defer further management to neurology    Thyroid nodule  Will obtain TSH and reflex T4  Consider thyroid ultrasound based off of above results either inpatient or outpatient.  Defer to PCP  Referral to endocrinology at discharge for consideration of biopsy if necessary    Congestive heart  failure  Continue home diuretic therapy  Does not appear to be in acute exacerbation    COPD (chronic obstructive pulmonary disease) (Multi)  Continue home ICS  Continue Bronchodilator PRN    CKD (chronic kidney disease)  Patient follows with Dr. Valdes  Her Cr has improved today to 1.12  She underwent Kidney biopsy this past Wednesday    Bipolar 2 disorder, major depressive episode (Multi)  Patient take Geodon 40 mg PO at bedtime    Anxiety disorder, unspecified  Continue home BuSpar dose 15 mg PO BID     Depressive disorder  Continue home SSRI therapy    Essential hypertension  Continue to monitor BP and adjust antihypertensive medications accordingly  Permissive hypertensive management currently holding patient's home lisinopril 10 mg daily  Restart when appropriate    Tobacco dependence  Smoking cessation counseling  Patient has no desire to stop  Nicotine patch     DVT risk  Subcutaneous. Heparin    Discharge plan  Will follow up with neurology recommendation  Home with  when cleared by neurology      GENO Angel-CNP           [1] atorvastatin, 20 mg, oral, Daily  busPIRone, 15 mg, oral, BID  FLUoxetine, 10 mg, oral, Daily  fluticasone furoate-vilanteroL, 1 puff, inhalation, Daily  heparin, 5,000 Units, subcutaneous, q12h  montelukast, 10 mg, oral, Daily  nicotine, 1 patch, transdermal, Daily  perflutren lipid microspheres, 0.5-10 mL of dilution, intravenous, Once in imaging  perflutren protein A microsphere, 0.5 mL, intravenous, Once in imaging  potassium chloride CR, 20 mEq, oral, Daily  sulfur hexafluoride microsphr, 2 mL, intravenous, Once in imaging  torsemide, 100 mg, oral, Daily  ziprasidone, 40 mg, oral, Nightly  [2]    [3] PRN medications: albuterol, hydrOXYzine HCL, labetaloL, melatonin, ondansetron, oxygen, polyethylene glycol

## 2025-05-24 NOTE — CARE PLAN
Problem: General Stroke  Goal: Demonstrate improvement in neurological exam throughout the shift  5/24/2025 0823 by Lianne Mccormack RN  Outcome: Progressing  5/24/2025 0823 by Lianne Mccormack RN  Outcome: Progressing  Goal: Maintain BP within ordered limits throughout shift  5/24/2025 0823 by Lianne Mccormack RN  Outcome: Progressing  5/24/2025 0823 by Lianne Mccormack RN  Outcome: Progressing  Goal: Participate in treatment (ie., meds, therapy) throughout shift  5/24/2025 0823 by Lianne Mccormack RN  Outcome: Progressing  5/24/2025 0823 by Lianne Mccormack RN  Outcome: Progressing  Goal: No symptoms of aspiration throughout shift  5/24/2025 0823 by Lianne Mccormack RN  Outcome: Progressing  5/24/2025 0823 by Lianne Mccormack RN  Outcome: Progressing  Goal: No symptoms of hemorrhage throughout shift  5/24/2025 0823 by Lianne Mccoramck RN  Outcome: Progressing  5/24/2025 0823 by Lianne Mccormack RN  Outcome: Progressing  Goal: Tolerate enteral feeding throughout shift  5/24/2025 0823 by Lianne Mccormack RN  Outcome: Progressing  5/24/2025 0823 by Lianne Mccormack RN  Outcome: Progressing  Goal: Controlled blood glucose throughout shift  5/24/2025 0823 by Lianne Mccormack RN  Outcome: Progressing  5/24/2025 0823 by Lianne Mccormack RN  Outcome: Progressing  Goal: Out of bed three times today  5/24/2025 0823 by Lianne Mccormack RN  Outcome: Progressing  5/24/2025 0823 by Lianne Mccormack RN  Outcome: Progressing     Problem: Fall/Injury  Goal: Not fall by end of shift  Outcome: Progressing  Goal: Be free from injury by end of the shift  Outcome: Progressing  Goal: Verbalize understanding of personal risk factors for fall in the hospital  Outcome: Progressing  Goal: Verbalize understanding of risk factor reduction measures to prevent injury from fall in the home  Outcome: Progressing  Goal: Use assistive devices by end of the shift  Outcome: Progressing  Goal: Pace activities to prevent fatigue by end of the  shift  Outcome: Progressing     Problem: Pain  Goal: Takes deep breaths with improved pain control throughout the shift  Outcome: Progressing  Goal: Turns in bed with improved pain control throughout the shift  Outcome: Progressing  Goal: Walks with improved pain control throughout the shift  Outcome: Progressing  Goal: Performs ADL's with improved pain control throughout shift  Outcome: Progressing  Goal: Participates in PT with improved pain control throughout the shift  Outcome: Progressing  Goal: Free from opioid side effects throughout the shift  Outcome: Progressing  Goal: Free from acute confusion related to pain meds throughout the shift  Outcome: Progressing   The patient's goals for the shift include      The clinical goals for the shift include Maitain safety, monitor labs and vs    Over the shift, the patient did not make progress toward the following goals. Barriers to progression include pain. Recommendations to address these barriers include pain control.

## 2025-05-24 NOTE — CARE PLAN
The patient's goals for the shift include      The clinical goals for the shift include pain control; monitor labs & vitals

## 2025-05-24 NOTE — PROGRESS NOTES
Speech-Language Pathology    SLP Adult Inpatient Speech-Language Cognition    Patient Name: Celine Jhaveri  MRN: [id number]  Today's Date: 5/24/2025   Time Calculation  Start Time: 1000  Stop Time: 1030  Time Calculation (min): 30 min       History Of Present Illness: Taken from Internal Medicine note dated 5/23/25    Celine Jhaveri is a 60 y.o. female presenting with Acute Headache-like symptoms with associated High Blood pressure prior to arrival. Patient reports that the headache started yesterday morning. States the headache came on gradually throughout the course of the day. States that it is a throbbing pain in the top of her head that does radiate forward to the front of her forehead. Endorses nausea, photosensitivity as well as sound sensitivity associated with her symptoms. She states that the headache did not feel like her typical migraine so she did not take her migraine medication. She states that starting at around 2 PM today she also developed some numbness in her left face and also has decreased sensation in her left upper extremity and lower extremity compared to the right side of her body. She denies any weakness, facial droop or vision changes. States she took Tylenol which did her relieve her headache yesterday but not today. Denies chest pain or shortness of breath. No vomiting or diarrhea. Otherwise has no acute complaints.   A brain attack was initiated in the ED.  Patient's NIH score was calculated as 1.  The patient's EKG was noted for normal sinus rhythm at 96 bpm  Right superior axis deviation  Low voltage QRS  Suspected arm lead reversal, interpretation assumes no reversal  QTc 449 ms  Patient's vital signs in the ED noted for Tmax 36.8, pulse rate 87, respirate 18, /75.  Patient was saturating 98% on room air.  Patient's ED diagnostic workup noted for CBC with differential that was essentially unremarkable.  The patient's blood chemistry was noted for a slightly elevated  creatinine at 1.12.  No major electrolyte abnormalities.  First set troponin level was within normal range.  Patient's coagulation profile was within normal range.  A brain attack was initiated in the ED.  Case discussed with stroke neurology on-call.  IH noted to be 1 in the ED.  Initial CT brain without IV contrast was noted for the following:     IMPRESSION:  No evidence of acute cortical infarct or intracranial hemorrhage.     A CT angio head and neck was also obtained in the ED.  This note for the following:     IMPRESSION:  No evidence of large vessel occlusion or significant stenosis in the  neck or intracranially. Incidental note made of 0.8 cm right parotid  nodule which may represent an intraparotid lymph node or benign tumor  such as pleomorphic adenoma. 1.3 cm right thyroid nodule. Consider  follow-up thyroid ultrasound.     In the ED the patient was administered acetaminophen 975 mg p.o. x 1.  The patient is given Decadron 10 mg IV push x 1.  Patient was given Benadryl 25 mg IV push x 1.  Patient was given Compazine 5 mg IV push x 1.  Patient was given 500 cc normal saline bolus.     Based off of stroke neurology on-call a request was made to admit the patient for persisting numbness and tingling.  This is in the setting of an acute migraine attack.    SLP Assessment:  SLP Assessment  SLP Assessment Results: Other (Comment) ([facility] cognition/speech/language)  Prognosis: Excellent  Medical Staff Made Aware: Yes  Strengths: Cognition, Motivation, Family/Caregiver Support  Barriers: Comorbidities  Education Provided: Yes      SLP Plan:  Plan  Inpatient/Swing Bed or Outpatient: Inpatient  SLP Plan: No skilled SLP  No Skilled SLP: At baseline function, No acute SLP goals identified, Safe to return home  SLP Discharge Recommendations: Home with no further SLP  Next Treatment Priority: 5/24 eval only  Discussed POC: Patient  Discussed Risks/Benefits: Yes  Patient/Caregiver Agreeable: Yes  SLP - OK to Discharge:  Yes      Subjective   Pt evaluated at bedside. Awake and alert, pleasant and participatory for evaluation.     SLP Visit Info:  SLP Received On: 05/24/25    General Visit Information:  General Information  Chart Reviewed: Yes  Reason for Referral: assess speech and language  Referred By: Dr. Barone  Past Medical History Relevant to Rehab:  Anxiety and depression     Bipolar 1 disorder (Multi)     Chronic kidney disease     COPD (chronic obstructive pulmonary disease) (Multi)     HTN (hypertension)     LINDA (obstructive sleep apnea)  Prior to Session Communication: Bedside nurse      Objective     Pain:  Pain Assessment  Pain Assessment: 0-10  0-10 (Numeric) Pain Score: 2  Pain Type: Acute pain  Pain Location: Chest  Pain Orientation: Right  Pain Descriptors: Aching  Pain Onset: Unable to tell  Pain Interventions:  (RN informed)  Response to Interventions: Decrease in pain      Cognition:  Cognition  Overall Cognitive Status: Within Functional Limits  Orientation Level: Oriented X4  Following Commands: Follows all commands and directions without difficulty  Attention: Within Functional Limits  Memory: Within Funtional Limits  Problem Solving: Within Functional Limits  Processing Speed: Within funtional limits      Tracheostomy:  Tracheostomy  Tracheostomy: No    Motor Speech Production:  Motor Speech Production  Assessments Used: diadochokinetic rates  Oral Motor : WFL  Automatic Speech: WFL  Repetition: WFL  Intelligibility: WFL  Diadochokinetic Rate: WFL  Paralinguistic Features: WFL  Respiratory Support: WFL      Auditory Comprehension:   Auditory Comprehension  Yes/No Questions: Within Functional Limits  Commands: Within Functional Limits  Conversation: Within Functional Limits    Verbal:  Verbal Expression  Primary Mode of Expression: Verbal  Primary Language: English  Confrontation Naming: Within Functional Limits  Repetition: Within Functional Limits  Open Ended Questions: Within Functional Limits  Conversation:  Within Functional Limits  Affect: Within Functional Limits  Prosody: Within Functional Limits  Eye Contact: Within Functional Limits  Topic Initiation: Within Functional Limits  Topic Maintenance: Within Functional Limits  Turn Taking: Within Functional Limits      Written Expression:  Written Expression  Dominant Hand: Right

## 2025-05-25 VITALS
BODY MASS INDEX: 38.64 KG/M2 | HEIGHT: 62 IN | RESPIRATION RATE: 20 BRPM | SYSTOLIC BLOOD PRESSURE: 139 MMHG | WEIGHT: 210 LBS | TEMPERATURE: 98.6 F | HEART RATE: 85 BPM | DIASTOLIC BLOOD PRESSURE: 76 MMHG | OXYGEN SATURATION: 94 %

## 2025-05-25 LAB — GLUCOSE BLD MANUAL STRIP-MCNC: 161 MG/DL (ref 74–99)

## 2025-05-25 PROCEDURE — 94640 AIRWAY INHALATION TREATMENT: CPT

## 2025-05-25 PROCEDURE — 2500000001 HC RX 250 WO HCPCS SELF ADMINISTERED DRUGS (ALT 637 FOR MEDICARE OP): Performed by: INTERNAL MEDICINE

## 2025-05-25 PROCEDURE — 2500000002 HC RX 250 W HCPCS SELF ADMINISTERED DRUGS (ALT 637 FOR MEDICARE OP, ALT 636 FOR OP/ED): Performed by: INTERNAL MEDICINE

## 2025-05-25 PROCEDURE — 99239 HOSP IP/OBS DSCHRG MGMT >30: CPT | Performed by: NURSE PRACTITIONER

## 2025-05-25 PROCEDURE — G0378 HOSPITAL OBSERVATION PER HR: HCPCS

## 2025-05-25 PROCEDURE — 82947 ASSAY GLUCOSE BLOOD QUANT: CPT

## 2025-05-25 RX ORDER — TORSEMIDE 100 MG/1
100 TABLET ORAL
Start: 2025-05-25

## 2025-05-25 RX ADMIN — TORSEMIDE 100 MG: 100 TABLET ORAL at 10:27

## 2025-05-25 RX ADMIN — FLUTICASONE FUROATE AND VILANTEROL TRIFENATATE 1 PUFF: 200; 25 POWDER RESPIRATORY (INHALATION) at 08:54

## 2025-05-25 RX ADMIN — BUSPIRONE HYDROCHLORIDE 15 MG: 5 TABLET ORAL at 10:27

## 2025-05-25 RX ADMIN — LISINOPRIL 10 MG: 10 TABLET ORAL at 10:27

## 2025-05-25 RX ADMIN — POTASSIUM CHLORIDE 20 MEQ: 1500 TABLET, EXTENDED RELEASE ORAL at 10:27

## 2025-05-25 RX ADMIN — ATORVASTATIN CALCIUM 20 MG: 20 TABLET, FILM COATED ORAL at 10:27

## 2025-05-25 RX ADMIN — MONTELUKAST 10 MG: 10 TABLET, FILM COATED ORAL at 10:27

## 2025-05-25 RX ADMIN — FLUOXETINE HYDROCHLORIDE 10 MG: 10 CAPSULE ORAL at 10:27

## 2025-05-25 ASSESSMENT — COGNITIVE AND FUNCTIONAL STATUS - GENERAL
MOBILITY SCORE: 24
DAILY ACTIVITIY SCORE: 24

## 2025-05-25 ASSESSMENT — PAIN - FUNCTIONAL ASSESSMENT: PAIN_FUNCTIONAL_ASSESSMENT: 0-10

## 2025-05-25 ASSESSMENT — PAIN SCALES - GENERAL
PAINLEVEL_OUTOF10: 0 - NO PAIN
PAINLEVEL_OUTOF10: 0 - NO PAIN

## 2025-05-25 NOTE — PROGRESS NOTES
05/25/25 1114   Discharge Planning   Home or Post Acute Services In home services   Type of Home Care Services Other (Comment)  (Per Home care order)   Expected Discharge Disposition Home H  ([facility]    [phone] confirmed accepting agency. Made aware of discharge. Contact info placed on AVS. Medical team aware)   Does the patient need discharge transport arranged? No     Cleared for dc by TCC.   DC secure

## 2025-05-25 NOTE — DISCHARGE SUMMARY
Discharge Diagnosis  TIA (transient ischemic attack)           Issues Requiring Follow-Up  Follow up with PCP and neurology    Discharge Meds     Medication List      CONTINUE taking these medications     albuterol 90 mcg/actuation inhaler; Inhale 2 puffs every 6 hours if   needed for wheezing or shortness of breath.   atorvastatin 20 mg tablet; Commonly known as: Lipitor; Take 1 tablet (20   mg) by mouth once daily.   busPIRone 30 mg tablet; Commonly known as: Buspar   Geodon 60 mg capsule; Generic drug: ziprasidone   hydrOXYzine pamoate 25 mg capsule; Commonly known as: Vistaril   lisinopril 10 mg tablet; Take 1 tablet (10 mg) by mouth once daily.   montelukast 10 mg tablet; Commonly known as: Singulair; TAKE 1 TABLET BY   MOUTH DAILY   potassium chloride CR 20 mEq ER tablet; Commonly known as: Klor-Con M20;   Take 1 tablet (20 mEq) by mouth once daily.   rimegepant 75 mg tablet,disintegrating; Commonly known as: NURTEC;   Dissolve 1 tablet (75 mg) in the mouth every other day.   torsemide 100 mg tablet; Commonly known as: Demadex; Take 1 tablet (100   mg) by mouth every other day.   TRELEGY ELLIPTA INHL     STOP taking these medications     cyclobenzaprine 10 mg tablet; Commonly known as: Flexeril   Farxiga 10 mg tablet; Generic drug: dapagliflozin propanediol   fluticasone propion-salmeteroL 500-50 mcg/dose diskus inhaler; Commonly   known as: Wixela Inhub   Imitrex 100 mg tablet; Generic drug: SUMAtriptan   ipratropium-albuteroL 0.5-2.5 mg/3 mL nebulizer solution; Commonly known   as: Duo-Neb     ASK your doctor about these medications     FLUoxetine 10 mg capsule; Commonly known as: PROzac       Test Results Pending At Discharge  Pending Labs       No current pending labs.            Hospital Course   Celine Jhaveri is a 60 y.o. female presenting with Acute Headache-like symptoms with associated High Blood pressure prior to arrival. Patient reports that the headache started yesterday morning. States the  headache came on gradually throughout the course of the day. States that it is a throbbing pain in the top of her head that does radiate forward to the front of her forehead. Endorses nausea, photosensitivity as well as sound sensitivity associated with her symptoms. She states that the headache did not feel like her typical migraine so she did not take her migraine medication. She states that starting at around 2 PM today she also developed some numbness in her left face and also has decreased sensation in her left upper extremity and lower extremity compared to the right side of her body. She denies any weakness, facial droop or vision changes. States she took Tylenol which did her relieve her headache yesterday but not today. Denies chest pain or shortness of breath. No vomiting or diarrhea. Otherwise has no acute complaints.   TIA (transient ischemic attack)  CT brain without contrast appreciated as well as CT Angio Head and Neck  Obtain TTE with bubble- not done, patient would like to be discharged today-defer to OP neurology , will place order as OP  Defer Carotid Arterial Doppler Ultrasound to Neurology   MRI head w/o IV Contrast on hold as the patient has a Spinal Cord Stimulator (BS) for past 15 years. Defer MRI/MRA to Neurology considering we have a CT Angio Head/Neck already on-file if MRI is compatible. Patient was instructed that SCS is non-MRI compatible in the past.  Continue home statin  Recent kidney biopsy->ASA on hold  Discussed neurology-> will follow up as OP early next week.   Symptoms much improved  Per ED note telestroke neurology eval was done, s/s likely 2/2 migraine   Acute headache  Patient takes Nurtec at home-> continue   Numbness and tingling of left arm and leg  Management as above  Pleomorphic adenoma of parotid gland  Incidental finding  Will defer further management to neurology  Thyroid nodule  Will obtain TSH and reflex T4- normal thyroxine  Consider thyroid ultrasound based off  of above results either inpatient or outpatient.  Defer to PCP  Referral to endocrinology at discharge for consideration of biopsy if necessary      CKD (chronic kidney disease)  Patient follows with Dr. Valdes  Her Cr has improved today to 1.12  She underwent Kidney biopsy this past Wednesday      Essential hypertension  Resume home medication     Patient would like to be discharged to home and see neurology outpatient.   I spent over 35 minutes in professional and overall care of this patient.    Pertinent Physical Exam At Time of Discharge  Physical Exam  Vitals reviewed.   Constitutional:       Appearance: She is obese.   HENT:      Head: Normocephalic.      Nose: Nose normal.      Mouth/Throat:      Mouth: Mucous membranes are moist.   Eyes:      Extraocular Movements: Extraocular movements intact.   Cardiovascular:      Rate and Rhythm: Regular rhythm.   Pulmonary:      Effort: Pulmonary effort is normal.   Abdominal:      General: Bowel sounds are normal.   Musculoskeletal:         General: Normal range of motion.      Cervical back: Neck supple.   Skin:     General: Skin is warm.   Neurological:      Mental Status: She is alert and oriented to person, place, and time.         Outpatient Follow-Up  No future appointments.      Venkatesh Olivia, APRN-CNP

## 2025-05-25 NOTE — DISCHARGE INSTRUCTIONS
Follow up with PCP and neurology  Endocrinology for thyroid nodule    Your homecare agency is:     [facility]    [phone]   They will be calling you in the next 24-48 hours to set up your first visit. Please reach out to them directly for questions or concerns.

## 2025-05-25 NOTE — CARE PLAN
Problem: General Stroke  Goal: Demonstrate improvement in neurological exam throughout the shift  Outcome: Progressing  Goal: Maintain BP within ordered limits throughout shift  Outcome: Progressing  Goal: Participate in treatment (ie., meds, therapy) throughout shift  Outcome: Progressing  Goal: No symptoms of aspiration throughout shift  Outcome: Progressing  Goal: No symptoms of hemorrhage throughout shift  Outcome: Progressing  Goal: Tolerate enteral feeding throughout shift  Outcome: Progressing  Goal: Controlled blood glucose throughout shift  Outcome: Progressing  Goal: Out of bed three times today  Outcome: Progressing     Problem: Fall/Injury  Goal: Not fall by end of shift  Outcome: Progressing  Goal: Be free from injury by end of the shift  Outcome: Progressing  Goal: Verbalize understanding of personal risk factors for fall in the hospital  Outcome: Progressing  Goal: Verbalize understanding of risk factor reduction measures to prevent injury from fall in the home  Outcome: Progressing  Goal: Use assistive devices by end of the shift  Outcome: Progressing  Goal: Pace activities to prevent fatigue by end of the shift  Outcome: Progressing     Problem: Pain  Goal: Takes deep breaths with improved pain control throughout the shift  Outcome: Progressing  Goal: Turns in bed with improved pain control throughout the shift  Outcome: Progressing  Goal: Walks with improved pain control throughout the shift  Outcome: Progressing  Goal: Performs ADL's with improved pain control throughout shift  Outcome: Progressing  Goal: Participates in PT with improved pain control throughout the shift  Outcome: Progressing  Goal: Free from opioid side effects throughout the shift  Outcome: Progressing  Goal: Free from acute confusion related to pain meds throughout the shift  Outcome: Progressing   The patient's goals for the shift include comfort    The clinical goals for the shift include monitor vital signs    Over the  shift, the patient did not make progress toward the following goals. Barriers to progression include pain. Recommendations to address these barriers include pain medication.

## 2025-05-25 NOTE — CARE PLAN
The patient's goals for the shift include      The clinical goals for the shift include Maitain safety, monitor labs and vs

## 2025-05-27 ENCOUNTER — PATIENT OUTREACH (OUTPATIENT)
Dept: PRIMARY CARE | Facility: CLINIC | Age: 60
End: 2025-05-27
Payer: COMMERCIAL

## 2025-05-27 LAB
LABORATORY COMMENT REPORT: NORMAL
Lab: NORMAL
PATH REPORT.FINAL DX SPEC: NORMAL
PATH REPORT.GROSS SPEC: NORMAL
PATH REPORT.RELEVANT HX SPEC: NORMAL
PATH REPORT.TOTAL CANCER: NORMAL

## 2025-05-27 NOTE — PROGRESS NOTES
Discharge Facility: [facility]– [place]  Discharge Diagnosis: TIA (transient ischemic attack)  Admission Date: 05/23/2025  Discharge Date: 05/25/2025    PCP Appointment Date: 06/02/2025, scheduled by office, no contact  Specialist Appointment Date: None  Hospital Encounter and Summary Linked: Yes  ED to Hosp-Admission (Discharged) with Emigdio Barone MD (05/23/2025)     Two attempts were made to reach patient within two business days after discharge. Left voicemail with contact information for patient to call back with any non-emergent questions or concerns.

## 2025-06-01 LAB
ATRIAL RATE: 96 BPM
P AXIS: 113 DEGREES
P OFFSET: 186 MS
P ONSET: 131 MS
PR INTERVAL: 176 MS
Q ONSET: 219 MS
QRS COUNT: 16 BEATS
QRS DURATION: 82 MS
QT INTERVAL: 356 MS
QTC CALCULATION(BAZETT): 449 MS
QTC FREDERICIA: 416 MS
R AXIS: 183 DEGREES
T AXIS: 125 DEGREES
T OFFSET: 397 MS
VENTRICULAR RATE: 96 BPM

## 2025-06-02 ENCOUNTER — OFFICE VISIT (OUTPATIENT)
Dept: PRIMARY CARE | Facility: CLINIC | Age: 60
End: 2025-06-02
Payer: COMMERCIAL

## 2025-06-02 ENCOUNTER — HOSPITAL ENCOUNTER (OUTPATIENT)
Dept: RADIOLOGY | Facility: HOSPITAL | Age: 60
Discharge: HOME | End: 2025-06-02
Payer: COMMERCIAL

## 2025-06-02 VITALS
RESPIRATION RATE: 16 BRPM | WEIGHT: 211 LBS | HEIGHT: 62 IN | SYSTOLIC BLOOD PRESSURE: 150 MMHG | TEMPERATURE: 97.1 F | BODY MASS INDEX: 38.83 KG/M2 | DIASTOLIC BLOOD PRESSURE: 86 MMHG | HEART RATE: 106 BPM | OXYGEN SATURATION: 98 %

## 2025-06-02 DIAGNOSIS — E04.1 THYROID NODULE: ICD-10-CM

## 2025-06-02 DIAGNOSIS — R01.1 HEART MURMUR: ICD-10-CM

## 2025-06-02 DIAGNOSIS — I63.9 CEREBROVASCULAR ACCIDENT (CVA), UNSPECIFIED MECHANISM (MULTI): Primary | ICD-10-CM

## 2025-06-02 DIAGNOSIS — G43.901 MIGRAINE WITH STATUS MIGRAINOSUS, NOT INTRACTABLE, UNSPECIFIED MIGRAINE TYPE: ICD-10-CM

## 2025-06-02 DIAGNOSIS — R53.1 LEFT-SIDED WEAKNESS: ICD-10-CM

## 2025-06-02 DIAGNOSIS — R20.2 PARESTHESIAS: ICD-10-CM

## 2025-06-02 DIAGNOSIS — N18.31 STAGE 3A CHRONIC KIDNEY DISEASE (MULTI): ICD-10-CM

## 2025-06-02 PROCEDURE — 3077F SYST BP >= 140 MM HG: CPT | Performed by: FAMILY MEDICINE

## 2025-06-02 PROCEDURE — 3008F BODY MASS INDEX DOCD: CPT | Performed by: FAMILY MEDICINE

## 2025-06-02 PROCEDURE — 76536 US EXAM OF HEAD AND NECK: CPT

## 2025-06-02 PROCEDURE — 99214 OFFICE O/P EST MOD 30 MIN: CPT | Performed by: FAMILY MEDICINE

## 2025-06-02 PROCEDURE — G2211 COMPLEX E/M VISIT ADD ON: HCPCS | Performed by: FAMILY MEDICINE

## 2025-06-02 PROCEDURE — 3079F DIAST BP 80-89 MM HG: CPT | Performed by: FAMILY MEDICINE

## 2025-06-02 PROCEDURE — 76536 US EXAM OF HEAD AND NECK: CPT | Performed by: RADIOLOGY

## 2025-06-02 ASSESSMENT — PATIENT HEALTH QUESTIONNAIRE - PHQ9
2. FEELING DOWN, DEPRESSED OR HOPELESS: NOT AT ALL
1. LITTLE INTEREST OR PLEASURE IN DOING THINGS: NOT AT ALL
SUM OF ALL RESPONSES TO PHQ9 QUESTIONS 1 AND 2: 0

## 2025-06-02 ASSESSMENT — ENCOUNTER SYMPTOMS
OCCASIONAL FEELINGS OF UNSTEADINESS: 0
LOSS OF SENSATION IN FEET: 0
DEPRESSION: 0

## 2025-06-02 ASSESSMENT — PAIN SCALES - GENERAL: PAINLEVEL_OUTOF10: 3

## 2025-06-02 NOTE — PROGRESS NOTES
"Subjective   Patient ID: Celine Jhaveri is a 60 y.o. female who presents for Hospital Follow-up (Pt is here for a hospital follow up of TIA and hypertension.).    HPI she was admitted recently from 5/23-5/25.    She had neuro visit on June 13th  She was having HA and then developed left facial and left sided numbness.  No facial droop or vision changes.   She was given nurtec every other day for the headaches.  MR was deferred due to spinal cord stimulator.   She continues to smoke.   Review of Systems see hpi    Objective   /86   Pulse 106   Temp 36.2 °C (97.1 °F) (Temporal)   Resp 16   Ht 1.575 m (5' 2\")   Wt 95.7 kg (211 lb)   SpO2 98%   BMI 38.59 kg/m²     Physical Exam  Constitutional:       General: She is not in acute distress.     Appearance: Normal appearance.   Cardiovascular:      Rate and Rhythm: Normal rate and regular rhythm.      Heart sounds: Murmur heard.   Pulmonary:      Breath sounds: Normal breath sounds. No wheezing.   Neurological:      Mental Status: She is alert.         Assessment/Plan   Problem List Items Addressed This Visit           ICD-10-CM    Migraine G43.909    Relevant Medications    rimegepant (NURTEC) 75 mg tablet,disintegrating    Renal failure N19    Relevant Medications    rimegepant (NURTEC) 75 mg tablet,disintegrating    Thyroid nodule E04.1    Relevant Orders    US thyroid     Other Visit Diagnoses         Codes      Cerebrovascular accident (CVA), unspecified mechanism (Multi)    -  Primary I63.9    Relevant Orders    Referral to Cardiology      Heart murmur     R01.1    Relevant Orders    Referral to Cardiology      Paresthesias     R20.2      Left-sided weakness     R53.1    Relevant Orders    Referral to Physical Therapy        Refill Nurtec odt and she will follow up with neuro to continue outpt evaluation.         "

## 2025-06-03 ENCOUNTER — TELEPHONE (OUTPATIENT)
Dept: PRIMARY CARE | Facility: CLINIC | Age: 60
End: 2025-06-03
Payer: COMMERCIAL

## 2025-06-03 ENCOUNTER — PATIENT OUTREACH (OUTPATIENT)
Dept: PRIMARY CARE | Facility: CLINIC | Age: 60
End: 2025-06-03
Payer: COMMERCIAL

## 2025-06-03 NOTE — TELEPHONE ENCOUNTER
Left voicemail notifying patient That is fine it was mainly for heart murmur which is not urgent currently

## 2025-06-03 NOTE — PROGRESS NOTES
Unable to reach patient for follow up call after recent hospitalization.   Left voicemail with call back number for patient to call if needed

## 2025-06-05 ENCOUNTER — TELEPHONE (OUTPATIENT)
Dept: INPATIENT UNIT | Facility: HOSPITAL | Age: 60
End: 2025-06-05
Payer: COMMERCIAL

## 2025-07-01 ENCOUNTER — HOSPITAL ENCOUNTER (OUTPATIENT)
Dept: RADIOLOGY | Facility: HOSPITAL | Age: 60
Discharge: HOME | End: 2025-07-01
Payer: COMMERCIAL

## 2025-07-01 DIAGNOSIS — I63.9 CEREBRAL INFARCTION, UNSPECIFIED: ICD-10-CM

## 2025-07-01 PROCEDURE — 70450 CT HEAD/BRAIN W/O DYE: CPT

## 2025-07-01 PROCEDURE — 70450 CT HEAD/BRAIN W/O DYE: CPT | Performed by: RADIOLOGY

## 2025-07-07 ENCOUNTER — APPOINTMENT (OUTPATIENT)
Dept: CARDIOLOGY | Facility: HOSPITAL | Age: 60
End: 2025-07-07
Payer: COMMERCIAL

## 2025-07-07 ENCOUNTER — HOSPITAL ENCOUNTER (EMERGENCY)
Facility: HOSPITAL | Age: 60
Discharge: HOME | End: 2025-07-07
Payer: COMMERCIAL

## 2025-07-07 ENCOUNTER — APPOINTMENT (OUTPATIENT)
Dept: RADIOLOGY | Facility: HOSPITAL | Age: 60
End: 2025-07-07
Payer: COMMERCIAL

## 2025-07-07 VITALS
RESPIRATION RATE: 18 BRPM | BODY MASS INDEX: 38.64 KG/M2 | OXYGEN SATURATION: 96 % | TEMPERATURE: 97.7 F | SYSTOLIC BLOOD PRESSURE: 152 MMHG | HEART RATE: 104 BPM | DIASTOLIC BLOOD PRESSURE: 85 MMHG | HEIGHT: 62 IN | WEIGHT: 210 LBS

## 2025-07-07 DIAGNOSIS — R20.2 PARESTHESIAS: Primary | ICD-10-CM

## 2025-07-07 LAB
ALBUMIN SERPL BCP-MCNC: 4.2 G/DL (ref 3.4–5)
ALP SERPL-CCNC: 101 U/L (ref 33–110)
ALT SERPL W P-5'-P-CCNC: 11 U/L (ref 7–45)
ANION GAP SERPL CALCULATED.3IONS-SCNC: 17 MMOL/L (ref 10–20)
APPEARANCE UR: CLEAR
APTT PPP: 28.5 SECONDS (ref 22–32.5)
AST SERPL W P-5'-P-CCNC: 17 U/L (ref 9–39)
BASOPHILS # BLD AUTO: 0.09 X10*3/UL (ref 0–0.1)
BASOPHILS NFR BLD AUTO: 1.2 %
BILIRUB SERPL-MCNC: 0.8 MG/DL (ref 0–1.2)
BILIRUB UR STRIP.AUTO-MCNC: NEGATIVE MG/DL
BUN SERPL-MCNC: 14 MG/DL (ref 6–23)
CALCIUM SERPL-MCNC: 9.4 MG/DL (ref 8.6–10.3)
CARDIAC TROPONIN I PNL SERPL HS: 5 NG/L (ref 0–13)
CHLORIDE SERPL-SCNC: 95 MMOL/L (ref 98–107)
CO2 SERPL-SCNC: 27 MMOL/L (ref 21–32)
COLOR UR: COLORLESS
CREAT SERPL-MCNC: 1.39 MG/DL (ref 0.5–1.05)
EGFRCR SERPLBLD CKD-EPI 2021: 44 ML/MIN/1.73M*2
EOSINOPHIL # BLD AUTO: 0.2 X10*3/UL (ref 0–0.7)
EOSINOPHIL NFR BLD AUTO: 2.8 %
ERYTHROCYTE [DISTWIDTH] IN BLOOD BY AUTOMATED COUNT: 13.5 % (ref 11.5–14.5)
GLUCOSE BLD MANUAL STRIP-MCNC: 93 MG/DL (ref 74–99)
GLUCOSE SERPL-MCNC: 90 MG/DL (ref 74–99)
GLUCOSE UR STRIP.AUTO-MCNC: NORMAL MG/DL
HCT VFR BLD AUTO: 44.3 % (ref 36–46)
HGB BLD-MCNC: 15.1 G/DL (ref 12–16)
IMM GRANULOCYTES # BLD AUTO: 0.02 X10*3/UL (ref 0–0.7)
IMM GRANULOCYTES NFR BLD AUTO: 0.3 % (ref 0–0.9)
INR PPP: 1 (ref 0.9–1.2)
KETONES UR STRIP.AUTO-MCNC: NEGATIVE MG/DL
LEUKOCYTE ESTERASE UR QL STRIP.AUTO: NEGATIVE
LYMPHOCYTES # BLD AUTO: 2.44 X10*3/UL (ref 1.2–4.8)
LYMPHOCYTES NFR BLD AUTO: 33.7 %
MCH RBC QN AUTO: 30.9 PG (ref 26–34)
MCHC RBC AUTO-ENTMCNC: 34.1 G/DL (ref 32–36)
MCV RBC AUTO: 91 FL (ref 80–100)
MONOCYTES # BLD AUTO: 0.67 X10*3/UL (ref 0.1–1)
MONOCYTES NFR BLD AUTO: 9.2 %
NEUTROPHILS # BLD AUTO: 3.83 X10*3/UL (ref 1.2–7.7)
NEUTROPHILS NFR BLD AUTO: 52.8 %
NITRITE UR QL STRIP.AUTO: NEGATIVE
NRBC BLD-RTO: 0 /100 WBCS (ref 0–0)
PH UR STRIP.AUTO: 5.5 [PH]
PLATELET # BLD AUTO: 186 X10*3/UL (ref 150–450)
POCT GLUCOSE: 93 MG/DL (ref 74–99)
POTASSIUM SERPL-SCNC: 3.5 MMOL/L (ref 3.5–5.3)
PROT SERPL-MCNC: 7.7 G/DL (ref 6.4–8.2)
PROT UR STRIP.AUTO-MCNC: NEGATIVE MG/DL
PROTHROMBIN TIME: 10.8 SECONDS (ref 9.3–12.7)
RBC # BLD AUTO: 4.88 X10*6/UL (ref 4–5.2)
RBC # UR STRIP.AUTO: NEGATIVE MG/DL
SODIUM SERPL-SCNC: 135 MMOL/L (ref 136–145)
SP GR UR STRIP.AUTO: 1
UROBILINOGEN UR STRIP.AUTO-MCNC: NORMAL MG/DL
WBC # BLD AUTO: 7.3 X10*3/UL (ref 4.4–11.3)

## 2025-07-07 PROCEDURE — 70450 CT HEAD/BRAIN W/O DYE: CPT

## 2025-07-07 PROCEDURE — 84484 ASSAY OF TROPONIN QUANT: CPT

## 2025-07-07 PROCEDURE — 85610 PROTHROMBIN TIME: CPT

## 2025-07-07 PROCEDURE — 99285 EMERGENCY DEPT VISIT HI MDM: CPT | Mod: 25

## 2025-07-07 PROCEDURE — 96374 THER/PROPH/DIAG INJ IV PUSH: CPT

## 2025-07-07 PROCEDURE — 93005 ELECTROCARDIOGRAM TRACING: CPT

## 2025-07-07 PROCEDURE — 70450 CT HEAD/BRAIN W/O DYE: CPT | Performed by: INTERNAL MEDICINE

## 2025-07-07 PROCEDURE — 2500000004 HC RX 250 GENERAL PHARMACY W/ HCPCS (ALT 636 FOR OP/ED)

## 2025-07-07 PROCEDURE — 85025 COMPLETE CBC W/AUTO DIFF WBC: CPT

## 2025-07-07 PROCEDURE — 85730 THROMBOPLASTIN TIME PARTIAL: CPT

## 2025-07-07 PROCEDURE — 81003 URINALYSIS AUTO W/O SCOPE: CPT

## 2025-07-07 PROCEDURE — 80053 COMPREHEN METABOLIC PANEL: CPT

## 2025-07-07 PROCEDURE — 96375 TX/PRO/DX INJ NEW DRUG ADDON: CPT

## 2025-07-07 PROCEDURE — 36415 COLL VENOUS BLD VENIPUNCTURE: CPT

## 2025-07-07 PROCEDURE — 82947 ASSAY GLUCOSE BLOOD QUANT: CPT | Mod: 59

## 2025-07-07 PROCEDURE — 99291 CRITICAL CARE FIRST HOUR: CPT

## 2025-07-07 RX ORDER — PROCHLORPERAZINE EDISYLATE 5 MG/ML
10 INJECTION INTRAMUSCULAR; INTRAVENOUS ONCE
Status: COMPLETED | OUTPATIENT
Start: 2025-07-07 | End: 2025-07-07

## 2025-07-07 RX ORDER — DIPHENHYDRAMINE HYDROCHLORIDE 50 MG/ML
25 INJECTION, SOLUTION INTRAMUSCULAR; INTRAVENOUS ONCE
Status: COMPLETED | OUTPATIENT
Start: 2025-07-07 | End: 2025-07-07

## 2025-07-07 RX ADMIN — PROCHLORPERAZINE EDISYLATE 10 MG: 5 INJECTION INTRAMUSCULAR; INTRAVENOUS at 16:47

## 2025-07-07 RX ADMIN — DIPHENHYDRAMINE HYDROCHLORIDE 25 MG: 50 INJECTION, SOLUTION INTRAMUSCULAR; INTRAVENOUS at 16:55

## 2025-07-07 ASSESSMENT — PAIN DESCRIPTION - LOCATION: LOCATION: CHEST

## 2025-07-07 ASSESSMENT — PAIN DESCRIPTION - PAIN TYPE: TYPE: ACUTE PAIN

## 2025-07-07 ASSESSMENT — PAIN DESCRIPTION - DESCRIPTORS
DESCRIPTORS: DISCOMFORT;DULL
DESCRIPTORS: DULL

## 2025-07-07 ASSESSMENT — PAIN SCALES - GENERAL
PAINLEVEL_OUTOF10: 5 - MODERATE PAIN
PAINLEVEL_OUTOF10: 4

## 2025-07-07 ASSESSMENT — PAIN - FUNCTIONAL ASSESSMENT: PAIN_FUNCTIONAL_ASSESSMENT: 0-10

## 2025-07-07 NOTE — ED TRIAGE NOTES
Brought in by EMS. Pt has a hx of a CVA on May 23rd. Pt has seen a neurologist. Today at 9am when she woke up her whole left side felt numb and still feels the same. She takes a baby aspirin. Pt is able to speak clearly and is alert and oriented x4

## 2025-07-07 NOTE — ED PROVIDER NOTES
HPI   Chief Complaint   Patient presents with    Stroke     Pt has left sided weakness that has not subsided        Patient is a 60-year-old female presenting with a chief complaint of strokelike symptoms.  Patient a recent CVA on May 23, today around 9 AM when she woke up, the whole left side of her was numb, still feels the same, patient is alert and oriented, able to speak clearly, headache, no other acute complaints at this time      History provided by:  Patient          Patient History   Medical History[1]  Surgical History[2]  Family History[3]  Social History[4]    Physical Exam   ED Triage Vitals [07/07/25 1603]   Temperature Heart Rate Respirations BP   36.5 °C (97.7 °F) (!) 109 18 (!) 151/106      Pulse Ox Temp Source Heart Rate Source Patient Position   96 % Temporal -- Lying      BP Location FiO2 (%)     Right arm --       Physical Exam  Vitals and nursing note reviewed. Exam conducted with a chaperone present.   Constitutional:       General: She is not in acute distress.     Appearance: Normal appearance. She is well-developed and normal weight. She is not ill-appearing, toxic-appearing or diaphoretic.   HENT:      Head: Normocephalic and atraumatic.      Nose: Nose normal.      Mouth/Throat:      Mouth: Mucous membranes are moist.      Pharynx: Oropharynx is clear.   Eyes:      Extraocular Movements: Extraocular movements intact.      Conjunctiva/sclera: Conjunctivae normal.      Pupils: Pupils are equal, round, and reactive to light.   Cardiovascular:      Rate and Rhythm: Normal rate and regular rhythm.      Pulses: Normal pulses.      Heart sounds: Normal heart sounds. No murmur heard.  Pulmonary:      Effort: Pulmonary effort is normal. No respiratory distress.      Breath sounds: Normal breath sounds.   Abdominal:      General: Abdomen is flat. Bowel sounds are normal.      Palpations: Abdomen is soft.      Tenderness: There is no abdominal tenderness.   Musculoskeletal:         General: No  swelling. Normal range of motion.      Cervical back: Normal range of motion and neck supple.   Skin:     General: Skin is warm and dry.      Capillary Refill: Capillary refill takes less than 2 seconds.   Neurological:      General: No focal deficit present.      Mental Status: She is alert and oriented to person, place, and time. Mental status is at baseline.      Cranial Nerves: No cranial nerve deficit.   Psychiatric:         Mood and Affect: Mood normal.         Behavior: Behavior normal.         Thought Content: Thought content normal.         Judgment: Judgment normal.           ED Course & MDM   ED Course as of 07/12/25 0324   Mon Jul 07, 2025   1625 EKG interpreted by myself independently, EKG shows sinus tachycardia, rate of 109 bpm, parable 170, QRS 82, , QTc 47, patient has no ST ovation or depression, negative for acute MI. [VASU]      ED Course User Index  [VASU] Stalin Rossi DO         Diagnoses as of 07/12/25 0324   Paresthesias                 No data recorded     [place] Coma Scale Score: 15 (07/07/25 1605 : Cathy Roy RN)       NIH Stroke Scale: 1 (07/07/25 1612 : Stalin Rossi DO)                   Medical Decision Making  Patient seen and evaluated at bedside, patient is in no acute distress.  I will order a brain attack protocol. Differential diagnosis includes but is not limited to CVA, TIA, electrolyte abnormality,  Patient's CBC showed no abnormalities, patient CMP showed sodium 135, chloride 95, GFR 44, creatinine 1.39, urinalysis did not show signs of infection, patient's CT of the head brain attack not show any acute findings, formal report listed below,  This seems similar to patient's prior issues with paresthesias, no acute changes, patient will be discharged, vies follow-up with neurology in the outpatient setting.    Diagnosis: Paresthesias  CT brain attack head wo IV contrast   Final Result    No acute intracranial abnormality is evident. MRI brain would be more    sensitive  and specific for the detection of acute ischemia.          MACRO:    Miriam Mata discussed the significance and urgency of this critical    finding by EPIC secure chat with Dr. SAHIL POLK on 7/7/2025 at 4:29 pm    with written response verification. (**-[id number]-**) Findings:  See    findings.                Signed by: Miriam Mata 7/7/2025 4:29 PM    Dictation workstation:   [id number]     Results for orders placed or performed during the hospital encounter of 07/07/25  -POCT GLUCOSE:   Collection Time: 07/07/25  4:06 PM       Result                      Value             Ref Range           POCT Glucose                93                74 - 99 mg/dL  -POCT glucose:   Collection Time: 07/07/25  4:11 PM       Result                      Value             Ref Range           POCT Glucose                93                74 - 99 mg/dL  -CBC and Auto Differential:   Collection Time: 07/07/25  4:20 PM       Result                      Value             Ref Range           WBC                         7.3               4.4 - 11.3 x*       nRBC                        0.0               0.0 - 0.0 /1*       RBC                         4.88              4.00 - 5.20 *       Hemoglobin                  15.1              12.0 - 16.0 *       Hematocrit                  44.3              36.0 - 46.0 %       MCV                         91                80 - 100 fL         MCH                         30.9              26.0 - 34.0 *       MCHC                        34.1              32.0 - 36.0 *       RDW                         13.5              11.5 - 14.5 %       Platelets                   186               150 - 450 x1*       Neutrophils %               52.8              40.0 - 80.0 %       Immature Granulocytes *     0.3               0.0 - 0.9 %         Lymphocytes %               33.7              13.0 - 44.0 %       Monocytes %                 9.2               2.0 - 10.0 %        Eosinophils %               2.8                0.0 - 6.0 %         Basophils %                 1.2               0.0 - 2.0 %         Neutrophils Absolute        3.83              1.20 - 7.70 *       Immature Granulocytes *     0.02              0.00 - 0.70 *       Lymphocytes Absolute        2.44              1.20 - 4.80 *       Monocytes Absolute          0.67              0.10 - 1.00 *       Eosinophils Absolute        0.20              0.00 - 0.70 *       Basophils Absolute          0.09              0.00 - 0.10 *  -Comprehensive metabolic panel:   Collection Time: 07/07/25  4:20 PM       Result                      Value             Ref Range           Glucose                     90                74 - 99 mg/dL       Sodium                      135 (L)           136 - 145 mm*       Potassium                   3.5               3.5 - 5.3 mm*       Chloride                    95 (L)            98 - 107 mmo*       Bicarbonate                 27                21 - 32 mmol*       Anion Gap                   17                10 - 20 mmol*       Urea Nitrogen               14                6 - 23 mg/dL        Creatinine                  1.39 (H)          0.50 - 1.05 *       eGFR                        44 (L)            >60 mL/min/1*       Calcium                     9.4               8.6 - 10.3 m*       Albumin                     4.2               3.4 - 5.0 g/*       Alkaline Phosphatase        101               33 - 110 U/L        Total Protein               7.7               6.4 - 8.2 g/*       AST                         17                9 - 39 U/L          Bilirubin, Total            0.8               0.0 - 1.2 mg*       ALT                         11                7 - 45 U/L     -Troponin I, High Sensitivity:   Collection Time: 07/07/25  4:20 PM       Result                      Value             Ref Range           Troponin I, High Sensi*     5                 0 - 13 ng/L    -Protime-INR:   Collection Time: 07/07/25  4:20 PM       Result                       Value             Ref Range           Protime                     10.8              9.3 - 12.7 s*       INR                         1.0               0.9 - 1.2      -APTT:   Collection Time: 07/07/25  4:20 PM       Result                      Value             Ref Range           aPTT                        28.5              22.0 - 32.5 *  -ECG 12 lead:   Collection Time: 07/07/25  4:24 PM       Result                      Value             Ref Range           Ventricular Rate            109               BPM                 Atrial Rate                 109               BPM                 UT Interval                 170               ms                  QRS Duration                82                ms                  QT Interval                 362               ms                  QTC Calculation(Bazett)     487               ms                  P Axis                      58                degrees             R Axis                      -4                degrees             T Axis                      58                degrees             QRS Count                   17                beats               Q Onset                     221               ms                  P Onset                     136               ms                  P Offset                    195               ms                  T Offset                    402               ms                  QTC Fredericia              441               ms             -Urinalysis with Reflex Culture and Microscopic:   Collection Time: 07/07/25  4:56 PM       Result                      Value             Ref Range           Color, Urine                Colorless (N)     Light-Yellow*       Appearance, Urine           Clear             Clear               Specific Gravity, Urine     1.005             1.005 - 1.035       pH, Urine                   5.5               5.0, 5.5, 6.*       Protein, Urine              NEGATIVE          NEGATIVE, 10*        Glucose, Urine              Normal            Normal mg/dL        Blood, Urine                NEGATIVE          NEGATIVE mg/*       Ketones, Urine              NEGATIVE          NEGATIVE mg/*       Bilirubin, Urine            NEGATIVE          NEGATIVE mg/*       Urobilinogen, Urine         Normal            Normal mg/dL        Nitrite, Urine              NEGATIVE          NEGATIVE            Leukocyte Esterase, Ur*     NEGATIVE          NEGATIVE         *Note: Due to a large number of results and/or encounters for the requested time period, some results have not been displayed. A complete set of results can be found in Results Review.  .        Procedure  Critical Care    Performed by: Stalin Rossi DO  Authorized by: Stalin Rossi DO    Critical care provider statement:     Critical care time (minutes):  35    Critical care time was exclusive of:  Separately billable procedures and treating other patients and teaching time    Critical care was necessary to treat or prevent imminent or life-threatening deterioration of the following conditions:  CNS failure or compromise    Critical care was time spent personally by me on the following activities:  Blood draw for specimens, development of treatment plan with patient or surrogate, discussions with consultants, discussions with primary provider, evaluation of patient's response to treatment, examination of patient, obtaining history from patient or surrogate, review of old charts, re-evaluation of patient's condition, pulse oximetry, ordering and performing treatments and interventions, ordering and review of laboratory studies and ordering and review of radiographic studies    Sections of this report were created using voice-to-text technology and may contain errors in translation    Stalin Rossi DO  Emergency Medicine           [1]   Past Medical History:  Diagnosis Date    Anxiety and depression     Bipolar 1 disorder (Multi)     Chronic kidney disease     COPD  "(chronic obstructive pulmonary disease) (Multi)     HTN (hypertension)     LINDA (obstructive sleep apnea)     TIA (transient ischemic attack) 2025   [2]   Past Surgical History:  Procedure Laterality Date     SECTION, CLASSIC      FOOT Right     GALLBLADDER SURGERY      HYSTERECTOMY      KNEE Right    [3] No family history on file.  [4]   Social History  Tobacco Use    Smoking status: Every Day     Current packs/day: 1.00     Average packs/day: 1 pack/day for 46.0 years (46.0 ttl pk-yrs)     Types: Cigarettes    Smokeless tobacco: Never   Vaping Use    Vaping status: Never Used   Substance Use Topics    Alcohol use: Not Currently     Comment: \"recovering addict\"    Drug use: Not Currently        Stalin Rossi DO  25 0327    "

## 2025-07-07 NOTE — DISCHARGE INSTRUCTIONS
You are seen today for paresthesias, your lab work and your CT scans did not show any abnormalities, please follow-up with your primary care provider and your neurologist, please return to the ER for worsening symptoms.

## 2025-07-09 ENCOUNTER — APPOINTMENT (OUTPATIENT)
Facility: CLINIC | Age: 60
End: 2025-07-09
Payer: COMMERCIAL

## 2025-07-10 LAB
ATRIAL RATE: 109 BPM
P AXIS: 58 DEGREES
P OFFSET: 195 MS
P ONSET: 136 MS
PR INTERVAL: 170 MS
Q ONSET: 221 MS
QRS COUNT: 17 BEATS
QRS DURATION: 82 MS
QT INTERVAL: 362 MS
QTC CALCULATION(BAZETT): 487 MS
QTC FREDERICIA: 441 MS
R AXIS: -4 DEGREES
T AXIS: 58 DEGREES
T OFFSET: 402 MS
VENTRICULAR RATE: 109 BPM

## 2025-07-14 ENCOUNTER — OFFICE VISIT (OUTPATIENT)
Dept: PRIMARY CARE | Facility: CLINIC | Age: 60
End: 2025-07-14
Payer: COMMERCIAL

## 2025-07-14 VITALS
OXYGEN SATURATION: 98 % | HEIGHT: 62 IN | RESPIRATION RATE: 16 BRPM | BODY MASS INDEX: 37.91 KG/M2 | HEART RATE: 97 BPM | TEMPERATURE: 97.1 F | WEIGHT: 206 LBS | SYSTOLIC BLOOD PRESSURE: 148 MMHG | DIASTOLIC BLOOD PRESSURE: 92 MMHG

## 2025-07-14 DIAGNOSIS — Z78.0 MENOPAUSE: ICD-10-CM

## 2025-07-14 DIAGNOSIS — R79.89 ABNORMAL TSH: Primary | ICD-10-CM

## 2025-07-14 DIAGNOSIS — R94.6 ABNORMAL THYROID FUNCTION TEST: ICD-10-CM

## 2025-07-14 DIAGNOSIS — I10 ESSENTIAL HYPERTENSION: Chronic | ICD-10-CM

## 2025-07-14 DIAGNOSIS — N95.1 HOT FLASHES DUE TO MENOPAUSE: ICD-10-CM

## 2025-07-14 DIAGNOSIS — J02.9 PHARYNGITIS, UNSPECIFIED ETIOLOGY: ICD-10-CM

## 2025-07-14 LAB — POC RAPID STREP: NEGATIVE

## 2025-07-14 PROCEDURE — 3080F DIAST BP >= 90 MM HG: CPT | Performed by: FAMILY MEDICINE

## 2025-07-14 PROCEDURE — 99214 OFFICE O/P EST MOD 30 MIN: CPT | Performed by: FAMILY MEDICINE

## 2025-07-14 PROCEDURE — 87880 STREP A ASSAY W/OPTIC: CPT | Mod: QW | Performed by: FAMILY MEDICINE

## 2025-07-14 PROCEDURE — G2211 COMPLEX E/M VISIT ADD ON: HCPCS | Performed by: FAMILY MEDICINE

## 2025-07-14 PROCEDURE — 3077F SYST BP >= 140 MM HG: CPT | Performed by: FAMILY MEDICINE

## 2025-07-14 PROCEDURE — 3008F BODY MASS INDEX DOCD: CPT | Performed by: FAMILY MEDICINE

## 2025-07-14 RX ORDER — LISINOPRIL 20 MG/1
20 TABLET ORAL DAILY
Qty: 100 TABLET | Refills: 3 | Status: SHIPPED | OUTPATIENT
Start: 2025-07-14 | End: 2026-08-18

## 2025-07-14 RX ORDER — FEZOLINETANT 45 MG/1
45 TABLET, FILM COATED ORAL DAILY
Qty: 90 TABLET | Refills: 1 | Status: SHIPPED | OUTPATIENT
Start: 2025-07-14

## 2025-07-14 RX ORDER — TRAZODONE HYDROCHLORIDE 50 MG/1
50 TABLET ORAL NIGHTLY
COMMUNITY
Start: 2025-07-11

## 2025-07-14 ASSESSMENT — ENCOUNTER SYMPTOMS
MUSCULOSKELETAL NEGATIVE: 1
OCCASIONAL FEELINGS OF UNSTEADINESS: 0
CARDIOVASCULAR NEGATIVE: 1
RESPIRATORY NEGATIVE: 1
GASTROINTESTINAL NEGATIVE: 1
CONSTITUTIONAL NEGATIVE: 1
DEPRESSION: 0
NEUROLOGICAL NEGATIVE: 1
LOSS OF SENSATION IN FEET: 0

## 2025-07-14 ASSESSMENT — PAIN SCALES - GENERAL: PAINLEVEL_OUTOF10: 0-NO PAIN

## 2025-07-14 NOTE — PROGRESS NOTES
"Subjective   Patient ID: Celine Jhaveri is a 60 y.o. female who presents for Follow-up (Pt is here for a 6 week follow up since seeing neuro. Pt complains of sore throat and right ear pain, she also has concerns of menopause.).    HPI she feels her fibromyalgia is flaring and has been somewhat delvalle/irritable.    She has been having trouble sleeping some.  She sees psychiatry for bipolar disorder and was given trazodone recently but has not started it yet.  She went to the ER last week due to numbness on the left side of her body.  She was released and told to follow up .    She had seen neuro and next ov in Sept.    She continues to smoke.    Home gp bp average has been 130/90  Review of Systems   Constitutional: Negative.    HENT: Negative.     Respiratory: Negative.     Cardiovascular: Negative.    Gastrointestinal: Negative.    Genitourinary: Negative.    Musculoskeletal: Negative.    Neurological: Negative.        Objective   BP (!) 148/92   Pulse 97   Temp 36.2 °C (97.1 °F) (Temporal)   Resp 16   Ht 1.575 m (5' 2\")   Wt 93.4 kg (206 lb)   SpO2 98%   BMI 37.68 kg/m²     Physical Exam  Constitutional:       General: She is not in acute distress.     Appearance: Normal appearance.   Cardiovascular:      Rate and Rhythm: Normal rate and regular rhythm.      Heart sounds: No murmur heard.  Pulmonary:      Breath sounds: Normal breath sounds. No wheezing.   Neurological:      Mental Status: She is alert.         Assessment/Plan   Problem List Items Addressed This Visit           ICD-10-CM    Essential hypertension (Chronic) I10    Relevant Medications    lisinopril 20 mg tablet     Other Visit Diagnoses         Codes      Abnormal TSH    -  Primary R79.89    Relevant Orders    TSH with reflex to Free T4 if abnormal      Pharyngitis, unspecified etiology     J02.9    Relevant Orders    POCT rapid strep A manually resulted (Completed)    Group A Streptococcus, Culture      Abnormal thyroid function test     " R94.6    Relevant Orders    TSH with reflex to Free T4 if abnormal      Menopause     Z78.0      Hot flashes due to menopause     N95.1    Relevant Medications    fezolinetant (Veozah) 45 mg tablet        Discussed smoking cessation.  Keep bp log and recheck 2 months.      Increase lisinopril to 20 mg daily.

## 2025-07-15 ENCOUNTER — EVALUATION (OUTPATIENT)
Facility: CLINIC | Age: 60
End: 2025-07-15
Payer: COMMERCIAL

## 2025-07-15 DIAGNOSIS — M79.7 FIBROMYALGIA: ICD-10-CM

## 2025-07-15 DIAGNOSIS — R53.1 LEFT-SIDED WEAKNESS: Primary | ICD-10-CM

## 2025-07-15 LAB — TSH SERPL-ACNC: 0.68 MIU/L (ref 0.4–4.5)

## 2025-07-15 PROCEDURE — 97162 PT EVAL MOD COMPLEX 30 MIN: CPT | Mod: GP

## 2025-07-15 PROCEDURE — 97110 THERAPEUTIC EXERCISES: CPT | Mod: GP

## 2025-07-15 ASSESSMENT — ENCOUNTER SYMPTOMS
PAIN SCALE AT LOWEST: 3
QUALITY: TIGHT
DEPRESSION: 1
LOSS OF SENSATION IN FEET: 1
OCCASIONAL FEELINGS OF UNSTEADINESS: 0
QUALITY: DULL ACHE
ALLEVIATING FACTORS: REST
PAIN SCALE: 5
PAIN SCALE AT HIGHEST: 6
ALLEVIATING FACTORS: MEDICATIONS
QUALITY: THROBBING

## 2025-07-15 ASSESSMENT — PAIN - FUNCTIONAL ASSESSMENT: PAIN_FUNCTIONAL_ASSESSMENT: 0-10

## 2025-07-15 ASSESSMENT — PAIN SCALES - GENERAL: PAINLEVEL_OUTOF10: 5 - MODERATE PAIN

## 2025-07-15 ASSESSMENT — ACTIVITIES OF DAILY LIVING (ADL)
POOR_BALANCE: 1
AMBULATION_WITH_ASSISTIVE_DEVICE: INDEPENDENT

## 2025-07-15 NOTE — PROGRESS NOTES
Physical Therapy Evaluation and Treatment     Patient Name: Celine Jhaveri  MRN: [id number]  Encounter date: 7/15/2025  Time Calculation  Start Time: 1101  Stop Time: 1145  Time Calculation (min): 44 min  PT Evaluation Time Entry  PT Evaluation (Moderate) Time Entry: 32  PT Therapeutic Procedures Time Entry  Therapeutic Exercise Time Entry: 12    Visit # 1 of 12  Visits/Dates Authorized: 7/15/25 - 1) NO AUTH / $257 DED MET / 80% coins / [place] VISITS / $9350 OOP not met / [facility] MEDICARE DUAL COMPLETE FULL / Ref:    [id number] / ds // 2)  [place] MEDICAID - Active / 100% contracted rate / Per RTE, [place] //    Current Problem:   Problem List Items Addressed This Visit           ICD-10-CM    Fibromyalgia M79.7    Relevant Orders    Follow Up In Physical Therapy    Left-sided weakness - Primary R53.1    Relevant Orders    Follow Up In Physical Therapy     Precautions:  Precautions  STEADI Fall Risk Score (The score of 4 or more indicates an increased risk of falling): 6 (Education was provided this date about fall risk as pt score above 4 on STEADI. Education provided about nightlights in bathroom and hallway at night reducing fall risk when pt uses the restroom.)  Precautions Comment: Anxiety, high blood pressure, COPD, Headaches, Kidney disease, Fibromylagia. Fall Risk       Steadi Fall Risk  One or more falls in the last year? No  How many Times?    Was the patient injured in the fall?    Has trouble stepping onto curb? No  Advised to use a cane or walker to get around safely? No  Often has to rush to toilet? No  Feels unsteady when walking? No  Has lost some feeling in feet? Yes  Often feels sad or depressed? Yes  Steadies self on furniture while walking at home? Yes  Takes medicine that makes them feel lightheaded or more tired than usual? No  Worried about Falling? Yes  Takes medicine to sleep or improve mood? Yes  Needs to push with hands when rising from a chair? Yes      Subjective Evaluation    History of  Present Illness  Mechanism of injury: Reason for referral: Left sided weakness   Referred By:  Dr Bragg    Pt reports that she is having left sided weakness and in May 23rd, 2025 she had a mini CVA however no showing up onto any CT Scans. 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 Currently working with [facility] to possibly having the simulator removed to complete MRIs.     She reports that she is having weakness into the left side with difficulty using reaching and gripping with the left hand and difficulty with balance and walking on the left side.     Pt reports that she is having low back pain and pain into the left hip. She reports that prolonged walking and standing bother the back and hip. Admits that she is not able to stand more than 5-10 minutes. She reports that she is able to walk the length of her driveway before the pain starts in low back and hip. Admits that stairs are difficulty due to pain and fear of falling. She reports that she is noticing some balance deficits as well.       Pain  Current pain ratin  At best pain rating: 3  At worst pain ratin  Quality: dull ache, tight and throbbing  Relieving factors: medications and rest    Social Support  Lives in: one-story house  Lives with: spouse         Pain Assessment: 0-10  0-10 (Numeric) Pain Score: 5 - Moderate pain  Pain Location: Back    Objective     Static Posture   General Observations  Shifted right.     Head  Forward.    Shoulders  Rounded.    Thoracic Spine  Hyperkyphosis.    Lumbar Spine   Decreased lordosis.     Pelvis   Anterior pelvic tilt    Strength/Myotome Testing     Left Shoulder     Planes of Motion   Flexion: 4   Abduction: 4     Right Shoulder     Planes of Motion   Flexion: 4+   Abduction: 4+     Left Hip   Planes of Motion   Flexion: 4  Extension: 4  Abduction: 4  Adduction: 4  External rotation: 4-  Internal rotation: 4-    Right Hip   Planes of Motion   Flexion: 4+  Extension: 4+  Abduction: 4+  Adduction:  4+  External rotation: 4  Internal rotation: 4    Left Knee   Flexion: 4  Prone flexion: 4  Quadriceps contraction: fair    Right Knee   Flexion: 4+  Prone flexion: 4+  Quadriceps contraction: good    Left Ankle/Foot   Dorsiflexion: 4-  Plantar flexion: 4-    Right Ankle/Foot   Dorsiflexion: 4  Plantar flexion: 4    Ambulation     Ambulation: Level Surfaces   Ambulation with assistive device: independent    Additional Level Surfaces Ambulation Details  Pt ambulates with a reduction into home with reduced step length and stride length. Reduced heel strike and push off, pt prefers to ambulate with shuffled gait pattern.     Ambulation: Stairs   Ascend stairs: independent  Pattern: reciprocal  Railings: two rails  Descend stairs: independent  Pattern: non-reciprocal  Railings: two rails    Additional Stairs Ambulation Details  Pt reports difficulty going down stairs with fear of falling.                Balance:  Romberg: Firm Eyes Open 30  sec  Tandem: Firm Eyes Open (B) 30 sec  Single leg: Firm Eyes Open (L) 1 seconds (R) 3 sec    Other Outcome Measures:  Other Measures  30 Second Sit to Stand: 9    Treatments:     Therapeutic Exercise 61686:   - Supine Hip adduction 2x10 with 5 second hold  - Supine Hip Abduction with green TB 2x10 with 5 second hold   - Seated LAQ 2x10  - Seated Marching 2x10     Neuromuscular Re-Ed 71825:     Therapeutic Activity 99886:     Gait Training 87702:     Manual Therapy 63062:     Modalities:       HEP / Access Codes URL: [email or web address]  Access Code: [email or web address]  URL: [email or web address]  Date: 07/15/2025  Prepared by: Eulalia Mcleod    Exercises  - Supine Hip Adduction Isometric with Ball  - 1 x daily - 3-4 x weekly - 2 sets - 10 reps - 5 hold  - Hooklying Clamshell with Resistance  - 1 x daily - 3-4 x weekly - 2 sets - 10 reps - 5 hold  - Seated Long Arc Quad  - 1 x daily - 3-4 x weekly - 2 sets - 10 reps  - Seated March  - 1 x daily - 3-4 x weekly - 2 sets - 10  reps    Assessment & Plan     Assessment  Impairments: abnormal gait, activity intolerance, impaired balance, impaired physical strength, lacks appropriate home exercise program and pain with function  Assessment details: Pt is a 60 year old female who presents to outpatient physical therapy with complaints of weakness and difficulty walking and a reduction into balance following a mini CVA that occurred Nay 23, 2025. Pt demonstrates impairments within strength, balance, altered gait, reduced activity tolerance, and reduced postural awareness limiting pts ability to complete ADLs, IADLs and recreational tasks. Pt would benefit from skilled PT interventions to address listed impairments improving QOL.      Prognosis: good    Plan  Therapy options: will be seen for skilled physical therapy services  Planned therapy interventions: abdominal trunk stabilization, postural training, neuromuscular re-education, therapeutic activities, strengthening, stretching, home exercise program, gait training, functional ROM exercises, flexibility, balance/weight-bearing training and body mechanics training  Frequency: 2x week  Duration in visits: 12  Treatment plan discussed with: patient          Moderate complexity due to patient's clinical presentation being evolving with changing characteristics, with comorbidities/complexities to include COPD, High blood pressure, Fibromyalgia, all of which may negatively impact rehab tolerance and progression.     Post-Treatment Symptoms:  Response to Interventions: Decrease in pain    Goals:   Active       PT Problem       PT Goals       Start:  07/15/25    Expected End:  07/24/25       1. Indep HEP and progression.  2. Pts single leg stance will improve to at least 10 seconds (B) to improve LE dressing while standing reducing fall risk.  3. Pts knee extension strength will improve to at least 4+/5 (B) in order to improve transfers with less reliance on UE support  4. Pts knee flexion will  improve to at least 4+/5 (B) in order to improve navigation of curbs with reduced risk for falls.   5. Pts hip flexion strength will improve to at least 4+/5 (B) in order to improve ability getting into and out of the car.   6. Pts 30 second sit to stand score will improve from 9 reps to at least 12 reps reducing fall risk.          Patient Stated Goals       Start:  07/15/25    Expected End:  10/13/25       1. Pt will ambulate in community with proper mechanics without risk for falls to participate in recreational activities.  2. Pt will be able to complete stairs without fear of falling to  complete laundry in basement.

## 2025-07-16 LAB — S PYO THROAT QL CULT: NORMAL

## 2025-07-24 ENCOUNTER — OFFICE VISIT (OUTPATIENT)
Dept: PRIMARY CARE | Facility: CLINIC | Age: 60
End: 2025-07-24
Payer: COMMERCIAL

## 2025-07-24 DIAGNOSIS — J01.00 SUBACUTE MAXILLARY SINUSITIS: Primary | ICD-10-CM

## 2025-07-24 DIAGNOSIS — R05.1 ACUTE COUGH: ICD-10-CM

## 2025-07-24 PROCEDURE — G2211 COMPLEX E/M VISIT ADD ON: HCPCS | Performed by: NURSE PRACTITIONER

## 2025-07-24 PROCEDURE — 99213 OFFICE O/P EST LOW 20 MIN: CPT | Performed by: NURSE PRACTITIONER

## 2025-07-24 RX ORDER — DOXYCYCLINE 100 MG/1
100 CAPSULE ORAL 2 TIMES DAILY
Qty: 20 CAPSULE | Refills: 0 | Status: SHIPPED | OUTPATIENT
Start: 2025-07-24 | End: 2025-08-03

## 2025-07-24 RX ORDER — PREDNISONE 10 MG/1
10 TABLET ORAL 4 TIMES DAILY
Qty: 20 TABLET | Refills: 0 | Status: SHIPPED | OUTPATIENT
Start: 2025-07-24 | End: 2025-07-29

## 2025-07-24 RX ORDER — BENZONATATE 100 MG/1
100 CAPSULE ORAL 3 TIMES DAILY PRN
Qty: 42 CAPSULE | Refills: 0 | Status: SHIPPED | OUTPATIENT
Start: 2025-07-24 | End: 2025-08-23

## 2025-07-24 ASSESSMENT — PAIN SCALES - GENERAL: PAINLEVEL_OUTOF10: 0-NO PAIN

## 2025-07-24 ASSESSMENT — ENCOUNTER SYMPTOMS
SINUS PRESSURE: 1
COUGH: 1
WHEEZING: 1
RHINORRHEA: 1

## 2025-07-24 NOTE — PROGRESS NOTES
Subjective   Patient ID: Veronica Jhaveri is a 60 y.o. female who presents for Sinusitis (Patient here for sinus pressure and green mucus.).    HPI Issues with recurrent sinus infection, cough and wheezing recent heat and humdity aggrevating, also smoker    Review of Systems   HENT:  Positive for rhinorrhea and sinus pressure.    Respiratory:  Positive for cough and wheezing.        Objective   There were no vitals taken for this visit.    Physical Exam  Constitutional:       General: She is not in acute distress.     Appearance: Normal appearance.   HENT:      Right Ear: Tympanic membrane normal.      Left Ear: Tympanic membrane normal.      Nose: Congestion present.      Mouth/Throat:      Mouth: Mucous membranes are moist.     Cardiovascular:      Rate and Rhythm: Normal rate and regular rhythm.      Heart sounds: No murmur heard.  Pulmonary:      Breath sounds: No wheezing (right anterior).     Neurological:      Mental Status: She is alert.         Assessment/Plan   Problem List Items Addressed This Visit           ICD-10-CM    Sinusitis - Primary J32.9    Relevant Medications    doxycycline (Monodox) 100 mg capsule    predniSONE (Deltasone) 10 mg tablet     Other Visit Diagnoses         Codes      Acute cough     R05.1    Relevant Medications    benzonatate (Tessalon) 100 mg capsule

## 2025-07-25 ENCOUNTER — TELEPHONE (OUTPATIENT)
Dept: PRIMARY CARE | Facility: CLINIC | Age: 60
End: 2025-07-25
Payer: COMMERCIAL

## 2025-08-05 ENCOUNTER — ANCILLARY PROCEDURE (OUTPATIENT)
Facility: CLINIC | Age: 60
End: 2025-08-05
Payer: COMMERCIAL

## 2025-08-05 ENCOUNTER — OFFICE VISIT (OUTPATIENT)
Facility: CLINIC | Age: 60
End: 2025-08-05
Payer: COMMERCIAL

## 2025-08-05 VITALS
HEART RATE: 95 BPM | SYSTOLIC BLOOD PRESSURE: 132 MMHG | DIASTOLIC BLOOD PRESSURE: 82 MMHG | OXYGEN SATURATION: 98 % | WEIGHT: 206 LBS | BODY MASS INDEX: 37.68 KG/M2

## 2025-08-05 DIAGNOSIS — G45.9 TRANSIENT CEREBRAL ISCHEMIA, UNSPECIFIED TYPE: ICD-10-CM

## 2025-08-05 DIAGNOSIS — I10 ESSENTIAL HYPERTENSION: Primary | Chronic | ICD-10-CM

## 2025-08-05 DIAGNOSIS — I63.9 CEREBROVASCULAR ACCIDENT (CVA), UNSPECIFIED MECHANISM (MULTI): ICD-10-CM

## 2025-08-05 DIAGNOSIS — I63.10 CEREBROVASCULAR ACCIDENT (CVA) DUE TO EMBOLISM OF PRECEREBRAL ARTERY (MULTI): ICD-10-CM

## 2025-08-05 DIAGNOSIS — E78.2 MIXED HYPERLIPIDEMIA: ICD-10-CM

## 2025-08-05 DIAGNOSIS — G45.8 OTHER SPECIFIED TRANSIENT CEREBRAL ISCHEMIAS: ICD-10-CM

## 2025-08-05 DIAGNOSIS — F17.200 TOBACCO USE DISORDER: ICD-10-CM

## 2025-08-05 PROCEDURE — 3075F SYST BP GE 130 - 139MM HG: CPT | Performed by: INTERNAL MEDICINE

## 2025-08-05 PROCEDURE — 99204 OFFICE O/P NEW MOD 45 MIN: CPT | Performed by: INTERNAL MEDICINE

## 2025-08-05 PROCEDURE — 3079F DIAST BP 80-89 MM HG: CPT | Performed by: INTERNAL MEDICINE

## 2025-08-05 PROCEDURE — 99211 OFF/OP EST MAY X REQ PHY/QHP: CPT

## 2025-08-05 PROCEDURE — 93246 EXT ECG>7D<15D RECORDING: CPT

## 2025-08-05 ASSESSMENT — ENCOUNTER SYMPTOMS
ABDOMINAL PAIN: 0
HEMATURIA: 0
DYSPNEA ON EXERTION: 0
BLURRED VISION: 0
NUMBNESS: 1
SHORTNESS OF BREATH: 0
LOSS OF SENSATION IN FEET: 0
COUGH: 0
PALPITATIONS: 0
DEPRESSION: 0
PARESTHESIAS: 0
OCCASIONAL FEELINGS OF UNSTEADINESS: 0
DYSURIA: 0

## 2025-08-05 ASSESSMENT — PAIN SCALES - GENERAL: PAINLEVEL_OUTOF10: 0-NO PAIN

## 2025-08-05 ASSESSMENT — LIFESTYLE VARIABLES
HOW OFTEN DO YOU HAVE SIX OR MORE DRINKS ON ONE OCCASION: NEVER
TOTAL SCORE: 0
HOW MANY STANDARD DRINKS CONTAINING ALCOHOL DO YOU HAVE ON A TYPICAL DAY: PATIENT DOES NOT DRINK

## 2025-08-05 NOTE — ASSESSMENT & PLAN NOTE
As noted will order echocardiogram and 14-day patch monitor looking for evidence of cardiac source of embolism.  Will bring the patient back to review the results with her.  Patient still has some numbness in her left side thus proper nomenclature would probably be stroke or cerebrovascular accident versus TIA.

## 2025-08-05 NOTE — PROGRESS NOTES
Subjective   Veronica Jhaveri is a 60 y.o. female.    Chief Complaint:  Establish Care    HPI  60-year-old female with history of hypertension and hyperlipidemia referred for a cardiac evaluation following stroke syndrome.  Patient states she has had 2 mini strokes over the last few months leading her with some mild numbness in her left side.  She was seen by her primary care provider who felt a cardiac investigation would be reasonable.  She describes no prior history of cardiac disease.  She has had no palpitations chest pains or unusual shortness of breath symptoms.    Review of Systems   Constitutional: Negative for malaise/fatigue.   HENT:  Negative for congestion.    Eyes:  Negative for blurred vision.   Cardiovascular:  Negative for chest pain, dyspnea on exertion and palpitations.   Respiratory:  Negative for cough and shortness of breath.    Musculoskeletal:  Negative for joint pain.   Gastrointestinal:  Negative for abdominal pain.   Genitourinary:  Negative for dysuria and hematuria.   Neurological:  Positive for numbness. Negative for paresthesias.       Objective   Constitutional:       Appearance: Not in distress.   Eyes:      Conjunctiva/sclera: Conjunctivae normal.   Neck:      Vascular: JVD normal.   Pulmonary:      Breath sounds: Normal breath sounds. No wheezing. No rhonchi. No rales.   Cardiovascular:      Normal rate. Regular rhythm.      Murmurs: There is no murmur.      No gallop.  No click. No rub.   Edema:     Pretibial: bilateral 1+ edema of the pretibial area.     Ankle: bilateral 1+ edema of the ankle.     Feet: bilateral 1+ edema of the feet.  Abdominal:      Palpations: Abdomen is soft.   Neurological:      General: No focal deficit present.      Mental Status: Alert.       Lab Review:   Office Visit on 07/14/2025   Component Date Value    POC Rapid Strep 07/14/2025 Negative     STREPTOCOCCUS, GROUP A C* 07/14/2025 SEE NOTE     TSH W/REFLEX TO FT4 07/14/2025 0.68    Admission on  07/07/2025, Discharged on 07/07/2025   Component Date Value    POCT Glucose 07/07/2025 93     WBC 07/07/2025 7.3     nRBC 07/07/2025 0.0     RBC 07/07/2025 4.88     Hemoglobin 07/07/2025 15.1     Hematocrit 07/07/2025 44.3     MCV 07/07/2025 91     MCH 07/07/2025 30.9     MCHC 07/07/2025 34.1     RDW 07/07/2025 13.5     Platelets 07/07/2025 186     Neutrophils % 07/07/2025 52.8     Immature Granulocytes %,* 07/07/2025 0.3     Lymphocytes % 07/07/2025 33.7     Monocytes % 07/07/2025 9.2     Eosinophils % 07/07/2025 2.8     Basophils % 07/07/2025 1.2     Neutrophils Absolute 07/07/2025 3.83     Immature Granulocytes Ab* 07/07/2025 0.02     Lymphocytes Absolute 07/07/2025 2.44     Monocytes Absolute 07/07/2025 0.67     Eosinophils Absolute 07/07/2025 0.20     Basophils Absolute 07/07/2025 0.09     Glucose 07/07/2025 90     Sodium 07/07/2025 135 (L)     Potassium 07/07/2025 3.5     Chloride 07/07/2025 95 (L)     Bicarbonate 07/07/2025 27     Anion Gap 07/07/2025 17     Urea Nitrogen 07/07/2025 14     Creatinine 07/07/2025 1.39 (H)     eGFR 07/07/2025 44 (L)     Calcium 07/07/2025 9.4     Albumin 07/07/2025 4.2     Alkaline Phosphatase 07/07/2025 101     Total Protein 07/07/2025 7.7     AST 07/07/2025 17     Bilirubin, Total 07/07/2025 0.8     ALT 07/07/2025 11     Troponin I, High Sensiti* 07/07/2025 5     Protime 07/07/2025 10.8     INR 07/07/2025 1.0     aPTT 07/07/2025 28.5     POCT Glucose 07/07/2025 93     Ventricular Rate 07/07/2025 109     Atrial Rate 07/07/2025 109     LA Interval 07/07/2025 170     QRS Duration 07/07/2025 82     QT Interval 07/07/2025 362     QTC Calculation(Bazett) 07/07/2025 487     P Axis 07/07/2025 58     R [place] 07/07/2025 -4     T [place] 07/07/2025 58     QRS Count 07/07/2025 17     Q Onset 07/07/2025 221     P Onset 07/07/2025 136     P Offset 07/07/2025 195     T Offset 07/07/2025 402     QTC Fredericia 07/07/2025 441     Color, Urine 07/07/2025 Colorless (N)     Appearance, Urine  07/07/2025 Clear     Specific Gravity, Urine 07/07/2025 1.005     pH, Urine 07/07/2025 5.5     Protein, Urine 07/07/2025 NEGATIVE     Glucose, Urine 07/07/2025 Normal     Blood, Urine 07/07/2025 NEGATIVE     Ketones, Urine 07/07/2025 NEGATIVE     Bilirubin, Urine 07/07/2025 NEGATIVE     Urobilinogen, Urine 07/07/2025 Normal     Nitrite, Urine 07/07/2025 NEGATIVE     Leukocyte Esterase, Urine 07/07/2025 NEGATIVE        Assessment/Plan   The primary encounter diagnosis was Essential hypertension. A diagnosis of Mixed hyperlipidemia was also pertinent to this visit.    Cerebrovascular accident (CVA) due to embolism of precerebral artery (Multi)  She has had 2 small strokes over the last 6 months and was referred to our practice for cardiac assessment.  She describes no palpitations or chest discomfort.  She has had no prior history of any cardiac disease.  I will order an echocardiogram to look for any evidence of a cardiac source of embolus.  Will also order a 14-day patch monitor study to see if there is any evidence of atrial fibrillation.    Transient cerebral ischemia  As noted will order echocardiogram and 14-day patch monitor looking for evidence of cardiac source of embolism.  Will bring the patient back to review the results with her.  Patient still has some numbness in her left side thus proper nomenclature would probably be stroke or cerebrovascular accident versus TIA.    Essential hypertension  Blood pressure today is just slightly elevated but she is anxious seeing a new physician.  Will do stress hygienic measures and recheck her blood pressure on return.    Hyperlipidemia  Patient has been placed on atorvastatin 20 mg once daily.  Will need to check a fasting lipid panel at some point in future.    Tobacco use disorder  Smoking cessation has been urged the patient not ready to stop at this point.

## 2025-08-05 NOTE — ASSESSMENT & PLAN NOTE
Patient has been placed on atorvastatin 20 mg once daily.  Will need to check a fasting lipid panel at some point in future.

## 2025-08-05 NOTE — ASSESSMENT & PLAN NOTE
She has had 2 small strokes over the last 6 months and was referred to our practice for cardiac assessment.  She describes no palpitations or chest discomfort.  She has had no prior history of any cardiac disease.  I will order an echocardiogram to look for any evidence of a cardiac source of embolus.  Will also order a 14-day patch monitor study to see if there is any evidence of atrial fibrillation.

## 2025-08-25 ENCOUNTER — HOSPITAL ENCOUNTER (OUTPATIENT)
Dept: CARDIOLOGY | Facility: CLINIC | Age: 60
Discharge: HOME | End: 2025-08-25
Payer: COMMERCIAL

## 2025-08-25 DIAGNOSIS — I67.9 CEREBROVASCULAR DISEASE, UNSPECIFIED: ICD-10-CM

## 2025-08-25 DIAGNOSIS — I63.9 CEREBROVASCULAR ACCIDENT (CVA), UNSPECIFIED MECHANISM (MULTI): ICD-10-CM

## 2025-08-25 DIAGNOSIS — I63.9 CEREBRAL INFARCTION, UNSPECIFIED: ICD-10-CM

## 2025-08-25 DIAGNOSIS — G45.8 OTHER SPECIFIED TRANSIENT CEREBRAL ISCHEMIAS: ICD-10-CM

## 2025-08-25 PROCEDURE — 93306 TTE W/DOPPLER COMPLETE: CPT

## 2025-08-25 PROCEDURE — 93306 TTE W/DOPPLER COMPLETE: CPT | Performed by: STUDENT IN AN ORGANIZED HEALTH CARE EDUCATION/TRAINING PROGRAM

## 2025-08-26 ENCOUNTER — DOCUMENTATION (OUTPATIENT)
Facility: CLINIC | Age: 60
End: 2025-08-26
Payer: COMMERCIAL

## 2025-08-26 DIAGNOSIS — R53.1 LEFT-SIDED WEAKNESS: Primary | ICD-10-CM

## 2025-08-26 DIAGNOSIS — M79.7 FIBROMYALGIA: ICD-10-CM

## 2025-08-26 LAB
AORTIC VALVE MEAN GRADIENT: 15 MMHG
AORTIC VALVE PEAK VELOCITY: 2.59 M/S
AV PEAK GRADIENT: 27 MMHG
AVA (PEAK VEL): 1.69 CM2
AVA (VTI): 1.7 CM2
EJECTION FRACTION APICAL 4 CHAMBER: 68.6
EJECTION FRACTION: 78 %
LEFT ATRIUM VOLUME AREA LENGTH INDEX BSA: 32.2 ML/M2
LEFT VENTRICLE INTERNAL DIMENSION DIASTOLE: 3.61 CM (ref 3.5–6)
LEFT VENTRICULAR OUTFLOW TRACT DIAMETER: 1.8 CM
RIGHT VENTRICLE FREE WALL PEAK S': 21.28 CM/S
RIGHT VENTRICLE PEAK SYSTOLIC PRESSURE: 35 MMHG
TRICUSPID ANNULAR PLANE SYSTOLIC EXCURSION: 2.7 CM

## 2025-08-26 PROCEDURE — 93248 EXT ECG>7D<15D REV&INTERPJ: CPT | Performed by: INTERNAL MEDICINE
